# Patient Record
Sex: FEMALE | Race: WHITE | NOT HISPANIC OR LATINO | Employment: OTHER | URBAN - NONMETROPOLITAN AREA
[De-identification: names, ages, dates, MRNs, and addresses within clinical notes are randomized per-mention and may not be internally consistent; named-entity substitution may affect disease eponyms.]

---

## 2017-01-31 ENCOUNTER — HOSPITAL ENCOUNTER (EMERGENCY)
Facility: HOSPITAL | Age: 62
Discharge: HOME | End: 2017-01-31
Attending: EMERGENCY MEDICINE

## 2017-01-31 VITALS
TEMPERATURE: 98.3 F | WEIGHT: 185 LBS | BODY MASS INDEX: 29.03 KG/M2 | HEART RATE: 57 BPM | RESPIRATION RATE: 18 BRPM | DIASTOLIC BLOOD PRESSURE: 81 MMHG | HEIGHT: 67 IN | OXYGEN SATURATION: 98 % | SYSTOLIC BLOOD PRESSURE: 117 MMHG

## 2017-01-31 DIAGNOSIS — T16.2XXA EAR FOREIGN BODY, LEFT, INITIAL ENCOUNTER: Primary | ICD-10-CM

## 2017-01-31 DIAGNOSIS — H92.02 EAR PAIN, LEFT: ICD-10-CM

## 2017-01-31 PROCEDURE — 99284 EMERGENCY DEPT VISIT MOD MDM: CPT

## 2017-01-31 RX ORDER — ACETAMINOPHEN 500 MG
1000 TABLET ORAL 2 TIMES DAILY
COMMUNITY

## 2017-01-31 RX ORDER — DULOXETIN HYDROCHLORIDE 60 MG/1
60 CAPSULE, DELAYED RELEASE ORAL 2 TIMES DAILY
COMMUNITY
End: 2017-04-27

## 2017-01-31 RX ORDER — OMEPRAZOLE 20 MG/1
20 CAPSULE, DELAYED RELEASE ORAL DAILY
COMMUNITY
End: 2022-05-27 | Stop reason: DRUGHIGH

## 2017-01-31 RX ORDER — FENOFIBRATE 160 MG/1
160 TABLET ORAL DAILY
COMMUNITY
End: 2019-01-11

## 2017-01-31 RX ORDER — LOSARTAN POTASSIUM 25 MG/1
25 TABLET ORAL DAILY
COMMUNITY
End: 2017-04-27

## 2017-01-31 RX ORDER — CYCLOBENZAPRINE HCL 10 MG
10 TABLET ORAL 3 TIMES DAILY PRN
COMMUNITY
End: 2017-12-04

## 2017-01-31 RX ORDER — TRIAMCINOLONE ACETONIDE 5 MG/G
1 CREAM TOPICAL 3 TIMES DAILY
COMMUNITY

## 2017-01-31 RX ORDER — POLYETHYLENE GLYCOL 3350 17 G/17G
17 POWDER, FOR SOLUTION ORAL NIGHTLY
COMMUNITY
End: 2017-05-31

## 2017-01-31 RX ORDER — TEMAZEPAM 30 MG/1
30 CAPSULE ORAL NIGHTLY PRN
COMMUNITY
End: 2017-05-31

## 2017-02-08 ENCOUNTER — TRANSCRIBE ORDERS (OUTPATIENT)
Dept: PHYSICAL THERAPY | Facility: HOSPITAL | Age: 62
End: 2017-02-08

## 2017-02-08 DIAGNOSIS — M54.2 CHRONIC NECK PAIN: Primary | ICD-10-CM

## 2017-02-08 DIAGNOSIS — G89.29 CHRONIC NECK PAIN: Primary | ICD-10-CM

## 2017-02-08 DIAGNOSIS — M48.02 CERVICAL STENOSIS OF SPINE: ICD-10-CM

## 2017-02-08 DIAGNOSIS — M51.36 DDD (DEGENERATIVE DISC DISEASE), LUMBAR: ICD-10-CM

## 2017-02-08 DIAGNOSIS — M54.5 LOW BACK PAIN, UNSPECIFIED BACK PAIN LATERALITY, UNSPECIFIED CHRONICITY, WITH SCIATICA PRESENCE UNSPECIFIED: ICD-10-CM

## 2017-02-10 ENCOUNTER — HOSPITAL ENCOUNTER (OUTPATIENT)
Dept: PHYSICAL THERAPY | Facility: HOSPITAL | Age: 62
Setting detail: THERAPIES SERIES
Discharge: HOME | End: 2017-02-10

## 2017-02-10 DIAGNOSIS — G89.29 CHRONIC NECK PAIN: Primary | ICD-10-CM

## 2017-02-10 DIAGNOSIS — M54.2 CHRONIC NECK PAIN: Primary | ICD-10-CM

## 2017-02-10 PROCEDURE — G0283 ELEC STIM OTHER THAN WOUND: HCPCS | Performed by: PHYSICAL THERAPIST

## 2017-02-10 PROCEDURE — G8982 BODY POS GOAL STATUS: HCPCS | Performed by: PHYSICAL THERAPIST

## 2017-02-10 PROCEDURE — G8981 BODY POS CURRENT STATUS: HCPCS | Performed by: PHYSICAL THERAPIST

## 2017-02-10 PROCEDURE — 97161 PT EVAL LOW COMPLEX 20 MIN: CPT | Performed by: PHYSICAL THERAPIST

## 2017-02-10 NOTE — PROGRESS NOTES
Outpatient Physical Therapy Ortho Initial Evaluation  [facility]     Patient Name: Maude Joyner  : 1955  MRN: [id number]  Today's Date: 2/10/2017      Visit Date: 02/10/2017    Pt attended  scheduled visits.   Re-cert date 3/3/17   Pt will RTD 3/10/17    There is no problem list on file for this patient.       Past Medical History   Diagnosis Date   • Fibromyalgia    • GERD (gastroesophageal reflux disease)    • Hyperlipidemia    • Hypertension         Past Surgical History   Procedure Laterality Date   • Knee arthroplasty     • Back surgery     • Hysterectomy         Visit Dx:     ICD-10-CM ICD-9-CM   1. Chronic neck pain M54.2 723.1    G89.29 338.29                 PT Ortho       02/10/17 1000    Subjective Comments    Subjective Comments 61 y.o female presents with c/o R sided neck pain, no c/o radiation of pain to UE. . Statrted aprox 8 mo ago with no recall of JOSIAH. States has pain to look down,as to read, for prolonged periods. States has soreness interfering with sleep, awakes with headaches which are rellieved by medication. Pt has been dealing with back and L hip pain recently. Pt lives with sridhar at home. She is retired.   -LF    Subjective Pain    Able to rate subjective pain? yes  -LF    Pre-Treatment Pain Level 7  -LF    Special Tests/Palpation    Special Tests/Palpation --   TTP over R C paraspinals, upper trap, suboccipital   -LF    ROM (Range of Motion)    General ROM Detail CROM: flexion WFL, mil dc/o; ext aprox 20  with c/o stiffness ; SB: L 25 with c/o stiffness on R, L 10 degrees with c/o painin R lateral neck; rotationL WNL, R aprox 20 with c/o R sided pulling.   -LF      User Key  (r) = Recorded By, (t) = Taken By, (c) = Cosigned By    Initials Name Provider Type     Ana Fitzgerald, PT Physical Therapist                                PT OP Goals       02/10/17 1000          PT Short Term Goals    STG Date to Achieve 17  -LF      STG 1 Pt will demonstrate 50% neck AROM  with trace c/o discomfort  -LF      STG 2 Pt will drive and read with min c/o neck pain.   -LF      STG 3 Pt will score 20 or better on NDI  -LF      STG 4 Pt will be I in HEP each session  -LF      Long Term Goals    LTG Date to Achieve 03/10/17  -LF      LTG 1 Pt will demonstrate full CROM without c/o pain  -LF      LTG 2 Pt will deny tendeness to palpation over R neck, paraspinals  -LF      LTG 3 Pt will be I in HEP to allow continuance of stretching after formal therapy is discontinued.   -LF      Time Calculation    PT Goal Re-Cert Due Date 03/03/17  -LF        User Key  (r) = Recorded By, (t) = Taken By, (c) = Cosigned By    Initials Name Provider Type     Ana Fitzgerald, PT Physical Therapist                PT Assessment/Plan       02/10/17 1000          PT Assessment    Functional Limitations Limitation in home management;Limitations in community activities;Performance in leisure activities;Performance in self-care ADL  -LF      Impairments Impaired flexibility;Impaired muscle length;Joint mobility;Muscle strength;Pain;Range of motion  -      Assessment Comments Pt presents with muscle tenderness and guarding of the neck, R upper trap. She would benefit from skilled intervention to address the pain, stiffness, limited motion, to educate, to instruct in HEP, modalities may be helpful.   -LF      Please refer to paper survey for additional self-reported information Yes  -LF      Rehab Potential Good  -LF      Patient/caregiver participated in establishment of treatment plan and goals Yes  -LF      Patient would benefit from skilled therapy intervention Yes  -LF      PT Plan    PT Frequency 2x/week  -LF      Predicted Duration of Therapy Intervention (days/wks) 4 weeks  -LF      Planned CPT's? PT RE-EVAL: 79424;PT THER PROC EA 15 MIN: 49679;PT THER ACT EA 15 MIN: 23693;PT MANUAL THERAPY EA 15 MIN: 22935;PT NEUROMUSC RE-EDUCATION EA 15 MIN: 24806;PT HOT OR COLD PACK TREAT MCARE;PT ELECTRICAL STIM UNATTEND:  ;PT TRACTION CERVICAL: 05193  -LF      Physical Therapy Interventions (Optional Details) dry needling;home exercise program;joint mobilization;manual therapy techniques;modalities;neuromuscular re-education;postural re-education;ROM (Range of Motion);strengthening;stretching  -LF      PT Plan Comments Continue therapy for stretching, strengthening, manual techniques, modalites, education, HEP instruction.  -LF        User Key  (r) = Recorded By, (t) = Taken By, (c) = Cosigned By    Initials Name Provider Type    SHERLYN Fitzgerald PT Physical Therapist                Modalities       02/10/17 1000          Moist Heat    MH Applied Yes  -LF      Location R neck to upper trap  -LF      Rx Minutes 10 mins  -LF      ELECTRICAL STIMULATION    Attended/Unattended Unattended  -LF      Stimulation Type IFC  -LF      Location/Electrode Placement/Other B low C paraspinals, R upper trap  -LF      Rx Minutes 20 mins  -LF        User Key  (r) = Recorded By, (t) = Taken By, (c) = Cosigned By    Initials Name Provider Type    SHERLYN Fitzgerald, JATIN Physical Therapist              Exercises       02/10/17 1000          Subjective Comments    Subjective Comments 61 y.o female presents with c/o R sided neck pain, no c/o radiation of pain to UE. . Statrted aprox 8 mo ago with no recall of JOSIAH. States has pain to look down,as to read, for prolonged periods. States has soreness interfering with sleep, awakes with headaches which are rellieved by medication. Pt has been dealing with back and L hip pain recently. Pt lives with sridhar at home. She is retired.   -LF      Subjective Pain    Able to rate subjective pain? yes  -LF      Pre-Treatment Pain Level 7  -LF        User Key  (r) = Recorded By, (t) = Taken By, (c) = Cosigned By    Initials Name Provider Type    SHERLYN Fitzgerald PT Physical Therapist                              Outcome Measures       02/10/17 0900          Neck Disability Index    Section 1 - Pain Intensity 2  -LF       Section 2 - Personal Care 2  -LF      Section 3 - Lifting 5  -LF      Section 4 - Work 3  -LF      Section 5 - Headaches 4  -LF      Section 6 - Concentration 1  -LF      Section 7 - Sleeping 3  -LF      Section 8 - Driving 1  -LF      Section 9 - Reading 2  -LF      Section 10 - Recreation 1  -LF      Neck Disability Index Score 24  -LF      Functional Assessment    Outcome Measure Options Neck Disability Index (NDI)  -LF        User Key  (r) = Recorded By, (t) = Taken By, (c) = Cosigned By    Initials Name Provider Type    LF Ana Fitzgerald, PT Physical Therapist            Time Calculation:   Start Time: 0943  Stop Time: 1030  Time Calculation (min): 47 min  Total Timed Code Minutes- PT: 47 minute(s)     Therapy Charges for Today     Code Description Service Date Service Provider Modifiers Qty    35440871600 HC PT CHNG MAIN POS CURRENT 2/10/2017 Ana Fitzgerald, PT GP, CJ 1    07213760505 HC PT CHNG MAIN POS PROJECTED 2/10/2017 Ana Fitzgerald, PT GP, CI 1    16479106852 HC PT EVAL LOW COMPLEXITY 2 2/10/2017 Ana Fitzgerald, PT GP 1     IN TENS SUPPL 2 LEAD PER MONTH 2/10/2017 Ana Fitzgerald, PT  1    01403086893 HC PT THER SUPP EA 15 MIN 2/10/2017 Ana Fitzgerald, PT GP 1    29605668242 HC ELECTRICAL STIM UNATTENDED 2/10/2017 Ana Fitzgerald, PT  1          PT G-Codes  PT Professional Judgement Used?: Yes  Outcome Measure Options: Neck Disability Index (NDI)  Score: 24  Functional Limitation: Changing and maintaining body position  Changing and Maintaining Body Position Current Status (): At least 20 percent but less than 40 percent impaired, limited or restricted  Changing and Maintaining Body Position Goal Status (): At least 1 percent but less than 20 percent impaired, limited or restricted         Ana Fitzgerald, PT  2/10/2017

## 2017-02-15 ENCOUNTER — HOSPITAL ENCOUNTER (OUTPATIENT)
Dept: PHYSICAL THERAPY | Facility: HOSPITAL | Age: 62
Setting detail: THERAPIES SERIES
Discharge: HOME | End: 2017-02-15

## 2017-02-15 DIAGNOSIS — G89.29 CHRONIC NECK PAIN: Primary | ICD-10-CM

## 2017-02-15 DIAGNOSIS — M54.2 CHRONIC NECK PAIN: Primary | ICD-10-CM

## 2017-02-15 PROCEDURE — G0283 ELEC STIM OTHER THAN WOUND: HCPCS

## 2017-02-15 PROCEDURE — 97140 MANUAL THERAPY 1/> REGIONS: CPT

## 2017-02-15 NOTE — PROGRESS NOTES
Outpatient Physical Therapy Ortho Treatment Note  [facility]     Patient Name: Maude Joyner  : 1955  MRN: [id number]  Today's Date: 2/15/2017      Visit Date: 02/15/2017     Subjective improvement 0  Visits 2/2  Visits [id number]  RTMD 3-  Recert 3-      Visit Dx:    ICD-10-CM ICD-9-CM   1. Chronic neck pain M54.2 723.1    G89.29 338.29       There is no problem list on file for this patient.       Past Medical History   Diagnosis Date   • Fibromyalgia    • GERD (gastroesophageal reflux disease)    • Hyperlipidemia    • Hypertension         Past Surgical History   Procedure Laterality Date   • Knee arthroplasty     • Back surgery     • Hysterectomy                               PT Assessment/Plan       02/10/17 1000          PT Assessment    Functional Limitations Limitation in home management;Limitations in community activities;Performance in leisure activities;Performance in self-care ADL  -LF      Impairments Impaired flexibility;Impaired muscle length;Joint mobility;Muscle strength;Pain;Range of motion  -LF      Assessment Comments Pt presents with muscle tenderness and guarding of the neck, R upper trap. She would benefit from skilled intervention to address the pain, stiffness, limited motion, to educate, to instruct in HEP, modalities may be helpful.   -LF      Please refer to paper survey for additional self-reported information Yes  -LF      Rehab Potential Good  -LF      Patient/caregiver participated in establishment of treatment plan and goals Yes  -LF      Patient would benefit from skilled therapy intervention Yes  -LF      PT Plan    PT Frequency 2x/week  -LF      Predicted Duration of Therapy Intervention (days/wks) 4 weeks  -LF      Planned CPT's? PT RE-EVAL: 48917;PT THER PROC EA 15 MIN: 26612;PT THER ACT EA 15 MIN: 70135;PT MANUAL THERAPY EA 15 MIN: 31111;PT NEUROMUSC RE-EDUCATION EA 15 MIN: 27926;PT HOT OR COLD PACK TREAT MCARE;PT ELECTRICAL STIM UNATTEND: ;PT  TRACTION CERVICAL: 57974  -LF      Physical Therapy Interventions (Optional Details) dry needling;home exercise program;joint mobilization;manual therapy techniques;modalities;neuromuscular re-education;postural re-education;ROM (Range of Motion);strengthening;stretching  -      PT Plan Comments Continue therapy for stretching, strengthening, manual techniques, modalites, education, HEP instruction.  -LF        User Key  (r) = Recorded By, (t) = Taken By, (c) = Cosigned By    Initials Name Provider Type     Ana Fitzgerald, PT Physical Therapist                Modalities       02/15/17 1500          Subjective Comments    Subjective Comments Cont to c/o stiffness and pain  -CP      Subjective Pain    Able to rate subjective pain? yes  -CP      Pre-Treatment Pain Level 5  -CP      Post-Treatment Pain Level 2  -CP      Moist Heat    MH Applied Yes  -CP      Location Cspine  -CP      Rx Minutes 10 mins  -CP      ELECTRICAL STIMULATION    Attended/Unattended Unattended  -CP      Stimulation Type IFC  -CP      Location/Electrode Placement/Other cspine  -CP      Rx Minutes 20 mins  -CP        User Key  (r) = Recorded By, (t) = Taken By, (c) = Cosigned By    Initials Name Provider Type     Gloria Robbins PTA Physical Therapy Assistant                Exercises       02/15/17 1500          Subjective Comments    Subjective Comments Cont to c/o stiffness and pain  -CP      Subjective Pain    Able to rate subjective pain? yes  -CP      Pre-Treatment Pain Level 5  -CP      Post-Treatment Pain Level 2  -CP      Exercise 1    Exercise Name 1 --  -CP      Reps 1 --  -CP      Time (Seconds) 1 --  -CP        User Key  (r) = Recorded By, (t) = Taken By, (c) = Cosigned By    Initials Name Provider Type    CP Gloria Robbins PTA Physical Therapy Assistant                        Manual Rx (last 36 hours)      Manual Treatments       02/15/17 1500          Manual Rx 1    Manual Rx 1 Location Cspine  -CP      Manual Rx 1 Type  Gentle Distraction and rom  -CP      Manual Rx 1 Duration 10  -CP      Manual Rx 2    Manual Rx 2 Location c-spine  -CP      Manual Rx 2 Type ME to correction right cspine rotation  -CP      Manual Rx 2 Duration 5  -CP        User Key  (r) = Recorded By, (t) = Taken By, (c) = Cosigned By    Initials Name Provider Type    CP Gloria Robbins PTA Physical Therapy Assistant                PT OP Goals       02/15/17 1716 02/15/17 1600 02/10/17 1000    PT Short Term Goals    STG Date to Achieve  02/24/17  -CP 02/24/17  -LF    STG 1  Pt will demonstrate 50% neck AROM with trace c/o discomfort  -CP Pt will demonstrate 50% neck AROM with trace c/o discomfort  -LF    STG 1 Progress  Not Met  -CP     STG 2  Pt will drive and read with min c/o neck pain.   -CP Pt will drive and read with min c/o neck pain.   -LF    STG 2 Progress  Not Met  -CP     STG 3  Pt will score 20 or better on NDI  -CP Pt will score 20 or better on NDI  -LF    STG 3 Progress  Not Met  -CP     STG 4  Pt will be I in HEP each session  -CP Pt will be I in HEP each session  -LF    STG 4 Progress  Not Met  -CP     Long Term Goals    LTG Date to Achieve  03/10/17  -CP 03/10/17  -LF    LTG 1  Pt will demonstrate full CROM without c/o pain  -CP Pt will demonstrate full CROM without c/o pain  -LF    LTG 1 Progress  Not Met  -CP     LTG 2  Pt will deny tendeness to palpation over R neck, paraspinals  -CP Pt will deny tendeness to palpation over R neck, paraspinals  -LF    LTG 2 Progress  Not Met  -CP     LTG 3  Pt will be I in HEP to allow continuance of stretching after formal therapy is discontinued.   -CP Pt will be I in HEP to allow continuance of stretching after formal therapy is discontinued.   -LF    LTG 3 Progress  Progressing  -CP     Time Calculation    PT Goal Re-Cert Due Date 03/03/17  -CP  03/03/17  -LF      User Key  (r) = Recorded By, (t) = Taken By, (c) = Cosigned By    Initials Name Provider Type    CP Gloria Robbins PTA Physical Therapy  Assistant    SHERLYN Fitzgerald, PT Physical Therapist                Therapy Education       02/15/17 1649    Therapy Education    Given HEP   no new hep  -CP    Program Reinforced  -CP    How Provided Verbal  -CP    Provided to Patient  -CP    Level of Understanding Verbalized  -CP      User Key  (r) = Recorded By, (t) = Taken By, (c) = Cosigned By    Initials Name Provider Type    CP Gloria Robbins PTA Physical Therapy Assistant                Time Calculation:   Start Time: 1610  Stop Time: 1714  Time Calculation (min): 64 min  Total Timed Code Minutes- PT: 35 minute(s)    Therapy Charges for Today     Code Description Service Date Service Provider Modifiers Qty    52590742652 HC PT MANUAL THERAPY EA 15 MIN 2/15/2017 Gloria Robbins PTA GP 1    58361448795 HC ELECTRICAL STIM UNATTENDED 2/15/2017 Gloria Robbins PTA  1    41117712043 HC PT THER SUPP EA 15 MIN 2/15/2017 Gloria Robbins PTA GP 1                    Gloria Robbins PTA  2/15/2017

## 2017-02-17 ENCOUNTER — HOSPITAL ENCOUNTER (OUTPATIENT)
Dept: PHYSICAL THERAPY | Facility: HOSPITAL | Age: 62
Setting detail: THERAPIES SERIES
Discharge: HOME | End: 2017-02-17

## 2017-02-17 DIAGNOSIS — M54.2 CHRONIC NECK PAIN: Primary | ICD-10-CM

## 2017-02-17 DIAGNOSIS — G89.29 CHRONIC NECK PAIN: Primary | ICD-10-CM

## 2017-02-17 PROCEDURE — 97012 MECHANICAL TRACTION THERAPY: CPT

## 2017-02-17 PROCEDURE — 97110 THERAPEUTIC EXERCISES: CPT

## 2017-02-17 PROCEDURE — 97140 MANUAL THERAPY 1/> REGIONS: CPT

## 2017-02-17 PROCEDURE — G0283 ELEC STIM OTHER THAN WOUND: HCPCS

## 2017-02-17 NOTE — PROGRESS NOTES
Outpatient Physical Therapy Ortho Treatment Note  [facility]     Patient Name: Maude Joyner  : 1955  MRN: [id number]  Today's Date: 2017      Visit Date: 2017      Subjective Improvement 50%  Visits 3/3    RTMD 3-  Recert 3-    Visit Dx:    ICD-10-CM ICD-9-CM   1. Chronic neck pain M54.2 723.1    G89.29 338.29       There is no problem list on file for this patient.       Past Medical History   Diagnosis Date   • Fibromyalgia    • GERD (gastroesophageal reflux disease)    • Hyperlipidemia    • Hypertension         Past Surgical History   Procedure Laterality Date   • Knee arthroplasty     • Back surgery     • Hysterectomy                               PT Assessment/Plan       17 1213          PT Assessment    Assessment Comments Patients pain  to 0/10 after ME  -CP      PT Plan    PT Plan Comments Monitor HEP d/c traction and sub with manual distraction  -CP        User Key  (r) = Recorded By, (t) = Taken By, (c) = Cosigned By    Initials Name Provider Type    CP Gloria Robbins PTA Physical Therapy Assistant                Modalities       17 0900          Moist Heat    MH Applied Yes  -CP      Location cspine  -CP      Rx Minutes 10 mins  -CP      Traction 98316    Traction Type Cervical   D/C traction after today. Patient had increase occipitcal pn  -CP      Rx Minutes 10  -CP      Duration Intermittent  -CP      Position Supine  -CP      Weight --   10/5  -CP      Hold 60  -CP      Relax 20  -CP        User Key  (r) = Recorded By, (t) = Taken By, (c) = Cosigned By    Initials Name Provider Type    CP Gloria Robbins PTA Physical Therapy Assistant                Exercises       17 0900          Subjective Comments    Subjective Comments Patient states that neck is better, however is reporting vertigo.  Patient performs eply 3 times this week.  -CP      Subjective Pain    Able to rate subjective pain? yes  -CP      Pre-Treatment Pain Level 5   -CP      Post-Treatment Pain Level 0  -CP      Subjective Pain Comment Stopped taking pain meds yesterday secondary to the side affects  -CP      Exercise 1    Exercise Name 1  Isometric cervical side bend Bilateral  -CP      Reps 1 10  -CP      Time (Seconds) 1 3  -CP      Exercise 2    Exercise Name 2 Isometric Cervical Lateral Rotation Bilateral  -CP      Reps 2 10  -CP      Time (Seconds) 2 5  -CP      Exercise 3    Exercise Name 3 Supine Chin tucks  -CP      Reps 3 10  -CP      Time (Seconds) 3 5  -CP        User Key  (r) = Recorded By, (t) = Taken By, (c) = Cosigned By    Initials Name Provider Type    CP Gloria Robbins PTA Physical Therapy Assistant                        Manual Rx (last 36 hours)      Manual Treatments       02/17/17 0900          Manual Rx 1    Manual Rx 1 Location Cervical   -CP      Manual Rx 1 Type Gentle Distraction and ROM  -CP      Manual Rx 1 Duration 4  -CP      Manual Rx 2    Manual Rx 2 Location cervical  -CP      Manual Rx 2 Type ME to correction left post rotation c-3  -CP      Manual Rx 2 Duration 4  -CP        User Key  (r) = Recorded By, (t) = Taken By, (c) = Cosigned By    Initials Name Provider Type    CP Gloria Robbins PTA Physical Therapy Assistant                PT OP Goals       02/17/17 1000 02/15/17 1716 02/15/17 1600    PT Short Term Goals    STG Date to Achieve 02/24/17  -CP  02/24/17  -CP    STG 1 Pt will demonstrate 50% neck AROM with trace c/o discomfort  -CP  Pt will demonstrate 50% neck AROM with trace c/o discomfort  -CP    STG 1 Progress Met  -CP  Not Met  -CP    STG 2 Pt will drive and read with min c/o neck pain.   -CP  Pt will drive and read with min c/o neck pain.   -CP    STG 2 Progress Progressing  -CP  Not Met  -CP    STG 3 Pt will score 20 or better on NDI  -CP  Pt will score 20 or better on NDI  -CP    STG 3 Progress Not Met  -CP  Not Met  -CP    STG 4 Pt will be I in HEP each session  -CP  Pt will be I in HEP each session  -CP    STG 4  Progress Progressing  -CP  Not Met  -CP    Long Term Goals    LTG Date to Achieve 03/10/17  -CP  03/10/17  -CP    LTG 1 Pt will demonstrate full CROM without c/o pain  -CP  Pt will demonstrate full CROM without c/o pain  -CP    LTG 1 Progress Met  -CP  Not Met  -CP    LTG 2 Pt will deny tendeness to palpation over R neck, paraspinals  -CP  Pt will deny tendeness to palpation over R neck, paraspinals  -CP    LTG 2 Progress Not Met  -CP  Not Met  -CP    LTG 3 Pt will be I in HEP to allow continuance of stretching after formal therapy is discontinued.   -CP  Pt will be I in HEP to allow continuance of stretching after formal therapy is discontinued.   -CP    LTG 3 Progress Progressing  -CP  Progressing  -CP    Time Calculation    PT Goal Re-Cert Due Date  03/03/17  -CP       02/10/17 1000          PT Short Term Goals    STG Date to Achieve 02/24/17  -LF      STG 1 Pt will demonstrate 50% neck AROM with trace c/o discomfort  -LF      STG 2 Pt will drive and read with min c/o neck pain.   -LF      STG 3 Pt will score 20 or better on NDI  -LF      STG 4 Pt will be I in HEP each session  -LF      Long Term Goals    LTG Date to Achieve 03/10/17  -LF      LTG 1 Pt will demonstrate full CROM without c/o pain  -LF      LTG 2 Pt will deny tendeness to palpation over R neck, paraspinals  -LF      LTG 3 Pt will be I in HEP to allow continuance of stretching after formal therapy is discontinued.   -LF      Time Calculation    PT Goal Re-Cert Due Date 03/03/17  -LF        User Key  (r) = Recorded By, (t) = Taken By, (c) = Cosigned By    Initials Name Provider Type    CP Gloria Robbins, PTA Physical Therapy Assistant    LF Ana Fitzgerald, PT Physical Therapist                Therapy Education       02/17/17 1018    Therapy Education    Given HEP   Isometric kamran side bending and rotation bilateral, supine chin tucks  -CP    Program New  -CP    How Provided Verbal;Demonstration;Written  -CP    Provided to Patient  -CP    Level of  Understanding Verbalized;Demonstrated  -CP      02/15/17 1649    Therapy Education    Given HEP   no new hep  -CP    Program Reinforced  -CP    How Provided Verbal  -CP    Provided to Patient  -CP    Level of Understanding Verbalized  -CP      User Key  (r) = Recorded By, (t) = Taken By, (c) = Cosigned By    Initials Name Provider Type    CP Gloria Robbins PTA Physical Therapy Assistant                Time Calculation:   Start Time: 0935  Stop Time: 1100  Time Calculation (min): 85 min  Total Timed Code Minutes- PT: 60 minute(s)    Therapy Charges for Today     Code Description Service Date Service Provider Modifiers Qty    89057617616 HC PT THER PROC EA 15 MIN 2/17/2017 Gloria Robbins PTA GP 1    55446366346 HC PT MANUAL THERAPY EA 15 MIN 2/17/2017 Gloria Robbins PTA GP 1    45734651814 HC PT TRACTION CERVICAL 2/17/2017 Gloria Robbins PTA GP 1    17859140381 HC ELECTRICAL STIM UNATTENDED 2/17/2017 Gloria Robbins PTA  1    00815999654 HC PT THER SUPP EA 15 MIN 2/17/2017 Gloria Robbins PTA GP 1                    Gloria Robbins PTA  2/17/2017

## 2017-02-21 ENCOUNTER — HOSPITAL ENCOUNTER (OUTPATIENT)
Dept: PHYSICAL THERAPY | Facility: HOSPITAL | Age: 62
Setting detail: THERAPIES SERIES
Discharge: HOME | End: 2017-02-21

## 2017-02-21 DIAGNOSIS — M54.2 CHRONIC NECK PAIN: Primary | ICD-10-CM

## 2017-02-21 DIAGNOSIS — G89.29 CHRONIC NECK PAIN: Primary | ICD-10-CM

## 2017-02-21 PROCEDURE — 97110 THERAPEUTIC EXERCISES: CPT

## 2017-02-21 PROCEDURE — 97140 MANUAL THERAPY 1/> REGIONS: CPT

## 2017-02-21 PROCEDURE — G0283 ELEC STIM OTHER THAN WOUND: HCPCS

## 2017-02-21 NOTE — PROGRESS NOTES
Outpatient Physical Therapy Ortho Treatment Note  [facility]     Patient Name: Maude Joyner  : 1955  MRN: [id number]  Today's Date: 2017      Visit Date: 2017     Subjective Improvement 90%  Visits   RTMD 3-  Recert date 3-    Visit Dx:    ICD-10-CM ICD-9-CM   1. Chronic neck pain M54.2 723.1    G89.29 338.29       There is no problem list on file for this patient.       Past Medical History   Diagnosis Date   • Fibromyalgia    • GERD (gastroesophageal reflux disease)    • Hyperlipidemia    • Hypertension         Past Surgical History   Procedure Laterality Date   • Knee arthroplasty     • Back surgery     • Hysterectomy                               PT Assessment/Plan       17 1034 17 1213       PT Assessment    Assessment Comments Patient progressing nicely,  No cspine rotation noted this date.  patients pain after manual 0/10  -CP Patients pain  to 0/10 after ME  -CP     PT Plan    PT Plan Comments One more visit then d/c to home and fitness  -CP Monitor HEP d/c traction and sub with manual distraction  -CP       User Key  (r) = Recorded By, (t) = Taken By, (c) = Cosigned By    Initials Name Provider Type    ZIGGY Robbins PTA Physical Therapy Assistant                Modalities       17 0900          Subjective Comments    Subjective Comments Patient states that nect is better.  Main c/o is stiffness  -CP      Subjective Pain    Able to rate subjective pain? yes  -CP      Pre-Treatment Pain Level 2  -CP      Subjective Pain Comment over counter meds  -CP      Moist Heat    MH Applied Yes  -CP      Location cspine  -CP      Rx Minutes 10 mins  -CP      ELECTRICAL STIMULATION    Attended/Unattended Unattended  -CP      Stimulation Type IFC  -CP      Location/Electrode Placement/Other cspine  -CP      Rx Minutes 20 mins  -CP        User Key  (r) = Recorded By, (t) = Taken By, (c) = Cosigned By    Initials Name Provider Type    ZIGGY PORTILLO  DUANE Robbins Physical Therapy Assistant                Exercises       02/21/17 0900          Subjective Comments    Subjective Comments Patient states that nect is better.  Main c/o is stiffness  -CP      Subjective Pain    Able to rate subjective pain? yes  -CP      Pre-Treatment Pain Level 2  -CP      Subjective Pain Comment over counter meds  -CP      Exercise 1    Exercise Name 1 Bilateral ut Stretch  -CP      Reps 1 3  -CP      Time (Seconds) 1 30  -CP      Exercise 2    Exercise Name 2 Levator Stretch  -CP      Reps 2 3  -CP      Time (Seconds) 2 30  -CP      Exercise 3    Exercise Name 3 supine chin tucks  -CP      Reps 3 10  -CP      Time (Seconds) 3 3  -CP        User Key  (r) = Recorded By, (t) = Taken By, (c) = Cosigned By    Initials Name Provider Type    CP Gloria Robbins PTA Physical Therapy Assistant                        Manual Rx (last 36 hours)      Manual Treatments       02/21/17 0900          Manual Rx 1    Manual Rx 1 Location Cspine  -CP      Manual Rx 1 Type distraction and prom  -CP      Manual Rx 1 Duration 10  -CP      Manual Rx 2    Manual Rx 2 Location cspine  -CP      Manual Rx 2 Type occipital release  -CP      Manual Rx 2 Duration 5  -CP        User Key  (r) = Recorded By, (t) = Taken By, (c) = Cosigned By    Initials Name Provider Type    CP Gloria Robbins PTA Physical Therapy Assistant                PT OP Goals       02/21/17 1000 02/17/17 1000 02/15/17 1716    PT Short Term Goals    STG Date to Achieve 02/24/17  -CP 02/24/17  -CP     STG 1 Pt will demonstrate 50% neck AROM with trace c/o discomfort  -CP Pt will demonstrate 50% neck AROM with trace c/o discomfort  -CP     STG 1 Progress Met  -CP Met  -CP     STG 2 Pt will drive and read with min c/o neck pain.   -CP Pt will drive and read with min c/o neck pain.   -CP     STG 2 Progress Met  -CP Progressing  -CP     STG 3 Pt will score 20 or better on NDI  -CP Pt will score 20 or better on NDI  -CP     STG 3 Progress Not Met   -CP Not Met  -CP     STG 4 Pt will be I in HEP each session  -CP Pt will be I in HEP each session  -CP     STG 4 Progress Met  -CP Progressing  -CP     Long Term Goals    LTG Date to Achieve 03/10/17  -CP 03/10/17  -CP     LTG 1 Pt will demonstrate full CROM without c/o pain  -CP Pt will demonstrate full CROM without c/o pain  -CP     LTG 1 Progress Met  -CP Met  -CP     LTG 2 Pt will deny tendeness to palpation over R neck, paraspinals  -CP Pt will deny tendeness to palpation over R neck, paraspinals  -CP     LTG 2 Progress Not Met  -CP Not Met  -CP     LTG 3 Pt will be I in HEP to allow continuance of stretching after formal therapy is discontinued.   -CP Pt will be I in HEP to allow continuance of stretching after formal therapy is discontinued.   -CP     LTG 3 Progress Progressing  -CP Progressing  -CP     Time Calculation    PT Goal Re-Cert Due Date 03/03/17  -CP  03/03/17  -CP      02/15/17 1600 02/10/17 1000       PT Short Term Goals    STG Date to Achieve 02/24/17  -CP 02/24/17  -LF     STG 1 Pt will demonstrate 50% neck AROM with trace c/o discomfort  -CP Pt will demonstrate 50% neck AROM with trace c/o discomfort  -LF     STG 1 Progress Not Met  -CP      STG 2 Pt will drive and read with min c/o neck pain.   -CP Pt will drive and read with min c/o neck pain.   -LF     STG 2 Progress Not Met  -CP      STG 3 Pt will score 20 or better on NDI  -CP Pt will score 20 or better on NDI  -LF     STG 3 Progress Not Met  -CP      STG 4 Pt will be I in HEP each session  -CP Pt will be I in HEP each session  -LF     STG 4 Progress Not Met  -CP      Long Term Goals    LTG Date to Achieve 03/10/17  -CP 03/10/17  -LF     LTG 1 Pt will demonstrate full CROM without c/o pain  -CP Pt will demonstrate full CROM without c/o pain  -LF     LTG 1 Progress Not Met  -CP      LTG 2 Pt will deny tendeness to palpation over R neck, paraspinals  -CP Pt will deny tendeness to palpation over R neck, paraspinals  -LF     LTG 2 Progress  Not Met  -CP      LTG 3 Pt will be I in HEP to allow continuance of stretching after formal therapy is discontinued.   -CP Pt will be I in HEP to allow continuance of stretching after formal therapy is discontinued.   -LF     LTG 3 Progress Progressing  -CP      Time Calculation    PT Goal Re-Cert Due Date  03/03/17  -LF       User Key  (r) = Recorded By, (t) = Taken By, (c) = Cosigned By    Initials Name Provider Type    CP Gloria Robbins PTA Physical Therapy Assistant     Ana Fitzgerald, JATIN Physical Therapist                Therapy Education       02/21/17 1028    Therapy Education    Given HEP   no change to hep  -CP    Program Reinforced  -CP    How Provided Verbal  -CP    Level of Understanding Verbalized  -CP      02/17/17 1018    Therapy Education    Given HEP   Isometric kamran side bending and rotation bilateral, supine chin tucks  -CP    Program New  -CP    How Provided Verbal;Demonstration;Written  -CP    Provided to Patient  -CP    Level of Understanding Verbalized;Demonstrated  -CP      02/15/17 1649    Therapy Education    Given HEP   no new hep  -CP    Program Reinforced  -CP    How Provided Verbal  -CP    Provided to Patient  -CP    Level of Understanding Verbalized  -CP      User Key  (r) = Recorded By, (t) = Taken By, (c) = Cosigned By    Initials Name Provider Type    CP Gloria Robbins PTA Physical Therapy Assistant                Time Calculation:   Start Time: 0935  Stop Time: 1040  Time Calculation (min): 65 min  Total Timed Code Minutes- PT: 45 minute(s)    Therapy Charges for Today     Code Description Service Date Service Provider Modifiers Qty    23514527900 HC PT THER PROC EA 15 MIN 2/21/2017 Gloria Robbins PTA GP 1    48485148763 HC PT MANUAL THERAPY EA 15 MIN 2/21/2017 Gloria Robbins PTA GP 1    60486842703 HC ELECTRICAL STIM UNATTENDED 2/21/2017 Gloria Robbins PTA  1    90271294999 HC PT THER SUPP EA 15 MIN 2/21/2017 Gloria Robbins PTA GP 1                    Gloria  BANDAR Robbins, PTA  2/21/2017

## 2017-02-23 ENCOUNTER — HOSPITAL ENCOUNTER (OUTPATIENT)
Dept: PHYSICAL THERAPY | Facility: HOSPITAL | Age: 62
Setting detail: THERAPIES SERIES
Discharge: HOME | End: 2017-02-23

## 2017-02-23 DIAGNOSIS — G89.29 CHRONIC NECK PAIN: Primary | ICD-10-CM

## 2017-02-23 DIAGNOSIS — M54.2 CHRONIC NECK PAIN: Primary | ICD-10-CM

## 2017-02-23 PROCEDURE — 97140 MANUAL THERAPY 1/> REGIONS: CPT

## 2017-02-23 PROCEDURE — 97110 THERAPEUTIC EXERCISES: CPT

## 2017-02-23 PROCEDURE — G0283 ELEC STIM OTHER THAN WOUND: HCPCS

## 2017-02-23 NOTE — PROGRESS NOTES
Outpatient Physical Therapy Ortho Treatment Note  [facility]     Patient Name: Maude Joyner  : 1955  MRN: [id number]  Today's Date: 2017      Visit Date: 2017     Subjective Improvement 90%  Visits 5/5  Visits approved medicare cap  RTMD 2017  D/C to home and one free month fitness membership    Visit Dx:    ICD-10-CM ICD-9-CM   1. Chronic neck pain M54.2 723.1    G89.29 338.29       There is no problem list on file for this patient.       Past Medical History   Diagnosis Date   • Fibromyalgia    • GERD (gastroesophageal reflux disease)    • Hyperlipidemia    • Hypertension         Past Surgical History   Procedure Laterality Date   • Knee arthroplasty     • Back surgery     • Hysterectomy                               PT Assessment/Plan       17 1049 17 1034 17 1213    PT Assessment    Assessment Comments ME corrected cpine rotation.  Pain decrease to 1/10. just soreness  -CP Patient progressing nicely,  No cspine rotation noted this date.  patients pain after manual 0/10  -CP Patients pain  to 0/10 after ME  -CP    PT Plan    PT Plan Comments D/C today to home and one month free fitness memebership  -CP One more visit then d/c to home and fitness  -CP Monitor HEP d/c traction and sub with manual distraction  -CP      User Key  (r) = Recorded By, (t) = Taken By, (c) = Cosigned By    Initials Name Provider Type    CP Gloria Robbins PTA Physical Therapy Assistant                Modalities       17 0900          Subjective Comments    Subjective Comments Patient states that she cleaned her car out yesterday and neck is sore today.  Mainly MM soreness.  today will be her last day.  Patients boyfriend will be having surgery and she will need to stay home to take care of him.  -CP      Subjective Pain    Able to rate subjective pain? yes  -CP      Pre-Treatment Pain Level 4  -CP      Subjective Pain Comment Over counter pain pills  -CP      Moist Heat     MH Applied Yes  -CP      Location cspine  -CP      Rx Minutes 15 mins  -CP      Ice    Ice Applied Yes  -CP      Location cspine with ifc  -CP      Rx Minutes --   20  -CP      Ice S/P Rx Yes  -CP      ELECTRICAL STIMULATION    Attended/Unattended Unattended  -CP      Stimulation Type IFC  -CP      Location/Electrode Placement/Other cspine  -CP      Rx Minutes 20 mins  -CP        User Key  (r) = Recorded By, (t) = Taken By, (c) = Cosigned By    Initials Name Provider Type    CP Gloria Robbins PTA Physical Therapy Assistant                Exercises       02/23/17 0900          Subjective Comments    Subjective Comments Patient states that she cleaned her car out yesterday and neck is sore today.  Mainly MM soreness.  today will be her last day.  Patients boyfriend will be having surgery and she will need to stay home to take care of him.  -CP      Subjective Pain    Able to rate subjective pain? yes  -CP      Pre-Treatment Pain Level 4  -CP      Subjective Pain Comment Over counter pain pills  -CP      Exercise 1    Exercise Name 1 Bilateral UT Stretch  -CP      Reps 1 3  -CP      Time (Seconds) 1 30  -CP      Exercise 2    Exercise Name 2 Levator Stretch  -CP      Reps 2 3  -CP      Time (Seconds) 2 30  -CP      Exercise 3    Exercise Name 3 Doorway Stretch  -CP      Reps 3 3  -CP      Time (Seconds) 3 30  -CP      Exercise 4    Exercise Name 4 standing scap row   -CP      Cueing 4 Tactile  -CP      Equipment 4 Theraband  -CP      Resistance 4 Red   latex free  -CP      Sets 4 2  -CP      Reps 4 10  -CP      Exercise 5    Exercise Name 5 No Money with Therabnd  -CP      Cueing 5 Verbal  -CP      Equipment 5 Theraband  -CP      Resistance 5 Red   latex free  -CP      Sets 5 2  -CP      Reps 5 10  -CP        User Key  (r) = Recorded By, (t) = Taken By, (c) = Cosigned By    Initials Name Provider Type    CP Gloria Robbins PTA Physical Therapy Assistant                        Manual Rx (last 36 hours)      Manual  Treatments       02/23/17 0900          Manual Rx 1    Manual Rx 1 Location cspine   -CP      Manual Rx 1 Type Distraction and prom  -CP      Manual Rx 1 Duration 4  -CP      Manual Rx 2    Manual Rx 2 Location cspine  -CP      Manual Rx 2 Type ME to corrrect left rotation  -CP      Manual Rx 2 Duration 4  -CP        User Key  (r) = Recorded By, (t) = Taken By, (c) = Cosigned By    Initials Name Provider Type    CP Gloria Robbins PTA Physical Therapy Assistant                PT OP Goals       02/23/17 1000 02/23/17 0100 02/21/17 1000    PT Short Term Goals    STG Date to Achieve  02/24/17  -CP 02/24/17  -CP    STG 1  Pt will demonstrate 50% neck AROM with trace c/o discomfort  -CP Pt will demonstrate 50% neck AROM with trace c/o discomfort  -CP    STG 1 Progress  Met  -CP Met  -CP    STG 2  Pt will drive and read with min c/o neck pain.   -CP Pt will drive and read with min c/o neck pain.   -CP    STG 2 Progress  Met  -CP Met  -CP    STG 3  Pt will score 20 or better on NDI  -CP Pt will score 20 or better on NDI  -CP    STG 3 Progress  Not Met  -CP Not Met  -CP    STG 4  Pt will be I in HEP each session  -CP Pt will be I in HEP each session  -CP    STG 4 Progress  Met  -CP Met  -CP    Long Term Goals    LTG Date to Achieve  03/10/17  -CP 03/10/17  -CP    LTG 1  Pt will demonstrate full CROM without c/o pain  -CP Pt will demonstrate full CROM without c/o pain  -CP    LTG 1 Progress  Met  -CP Met  -CP    LTG 2  Pt will deny tendeness to palpation over R neck, paraspinals  -CP Pt will deny tendeness to palpation over R neck, paraspinals  -CP    LTG 2 Progress  Not Met  -CP Not Met  -CP    LTG 3  Pt will be I in HEP to allow continuance of stretching after formal therapy is discontinued.   -CP Pt will be I in HEP to allow continuance of stretching after formal therapy is discontinued.   -CP    LTG 3 Progress  Met  -CP Progressing  -CP    Time Calculation    PT Goal Re-Cert Due Date 03/03/17  -CP  03/03/17  -CP       02/17/17 1000 02/15/17 1716 02/15/17 1600    PT Short Term Goals    STG Date to Achieve 02/24/17  -CP  02/24/17  -CP    STG 1 Pt will demonstrate 50% neck AROM with trace c/o discomfort  -CP  Pt will demonstrate 50% neck AROM with trace c/o discomfort  -CP    STG 1 Progress Met  -CP  Not Met  -CP    STG 2 Pt will drive and read with min c/o neck pain.   -CP  Pt will drive and read with min c/o neck pain.   -CP    STG 2 Progress Progressing  -CP  Not Met  -CP    STG 3 Pt will score 20 or better on NDI  -CP  Pt will score 20 or better on NDI  -CP    STG 3 Progress Not Met  -CP  Not Met  -CP    STG 4 Pt will be I in HEP each session  -CP  Pt will be I in HEP each session  -CP    STG 4 Progress Progressing  -CP  Not Met  -CP    Long Term Goals    LTG Date to Achieve 03/10/17  -CP  03/10/17  -CP    LTG 1 Pt will demonstrate full CROM without c/o pain  -CP  Pt will demonstrate full CROM without c/o pain  -CP    LTG 1 Progress Met  -CP  Not Met  -CP    LTG 2 Pt will deny tendeness to palpation over R neck, paraspinals  -CP  Pt will deny tendeness to palpation over R neck, paraspinals  -CP    LTG 2 Progress Not Met  -CP  Not Met  -CP    LTG 3 Pt will be I in HEP to allow continuance of stretching after formal therapy is discontinued.   -CP  Pt will be I in HEP to allow continuance of stretching after formal therapy is discontinued.   -CP    LTG 3 Progress Progressing  -CP  Progressing  -CP    Time Calculation    PT Goal Re-Cert Due Date  03/03/17  -CP       02/10/17 1000          PT Short Term Goals    STG Date to Achieve 02/24/17  -LF      STG 1 Pt will demonstrate 50% neck AROM with trace c/o discomfort  -LF      STG 2 Pt will drive and read with min c/o neck pain.   -LF      STG 3 Pt will score 20 or better on NDI  -LF      STG 4 Pt will be I in HEP each session  -LF      Long Term Goals    LTG Date to Achieve 03/10/17  -LF      LTG 1 Pt will demonstrate full CROM without c/o pain  -LF      LTG 2 Pt will deny tendeness  to palpation over R neck, paraspinals  -LF      LTG 3 Pt will be I in HEP to allow continuance of stretching after formal therapy is discontinued.   -LF      Time Calculation    PT Goal Re-Cert Due Date 03/03/17  -LF        User Key  (r) = Recorded By, (t) = Taken By, (c) = Cosigned By    Initials Name Provider Type    CP Gloria Robbins PTA Physical Therapy Assistant    LF Ana Fitzgerald, PT Physical Therapist                Therapy Education       02/23/17 0958    Therapy Education    Given HEP   Doorway stretch, No money with theraband, mid row with theraban  -CP    Program New  -CP    How Provided Verbal;Demonstration;Written  -CP    Provided to Patient  -CP    Level of Understanding Verbalized;Demonstrated  -CP      02/21/17 1028    Therapy Education    Given HEP   no change to hep  -CP    Program Reinforced  -CP    How Provided Verbal  -CP    Level of Understanding Verbalized  -CP      02/17/17 1018    Therapy Education    Given HEP   Isometric kamran side bending and rotation bilateral, supine chin tucks  -CP    Program New  -CP    How Provided Verbal;Demonstration;Written  -CP    Provided to Patient  -CP    Level of Understanding Verbalized;Demonstrated  -CP      User Key  (r) = Recorded By, (t) = Taken By, (c) = Cosigned By    Initials Name Provider Type    CP Gloria Robbins PTA Physical Therapy Assistant                Time Calculation:   Start Time: 0930  Stop Time: 1035  Time Calculation (min): 65 min  Total Timed Code Minutes- PT: 45 minute(s)    Therapy Charges for Today     Code Description Service Date Service Provider Modifiers Qty     [place] TENS SUPPL 2 LEAD PER MONTH 2/23/2017 Gloria Robbins PTA  1    75574236170 HC ELECTRICAL STIM UNATTENDED 2/23/2017 Gloria Robbins PTA  1    40281309756 HC PT MANUAL THERAPY EA 15 MIN 2/23/2017 Gloria Robbins PTA GP 1    18189601177 HC PT THER PROC EA 15 MIN 2/23/2017 Gloria Robbins PTA GP 1    20467056895 HC PT THER SUPP EA 15 MIN 2/23/2017  Gloria Robbins, PTA GP 1                    Gloria Robbins, PTA  2/23/2017

## 2017-04-27 ENCOUNTER — OFFICE VISIT (OUTPATIENT)
Dept: OTOLARYNGOLOGY | Facility: CLINIC | Age: 62
End: 2017-04-27

## 2017-04-27 ENCOUNTER — CLINICAL SUPPORT (OUTPATIENT)
Dept: AUDIOLOGY | Facility: CLINIC | Age: 62
End: 2017-04-27

## 2017-04-27 VITALS — TEMPERATURE: 96.9 F | WEIGHT: 226 LBS | HEIGHT: 67 IN | BODY MASS INDEX: 35.47 KG/M2

## 2017-04-27 DIAGNOSIS — H93.12 TINNITUS OF LEFT EAR: Primary | ICD-10-CM

## 2017-04-27 DIAGNOSIS — H90.5 SENSORINEURAL HEARING LOSS OF LEFT EAR: ICD-10-CM

## 2017-04-27 DIAGNOSIS — H90.3 SENSORINEURAL HEARING LOSS, ASYMMETRICAL: Primary | ICD-10-CM

## 2017-04-27 PROCEDURE — 99203 OFFICE O/P NEW LOW 30 MIN: CPT | Performed by: OTOLARYNGOLOGY

## 2017-04-27 RX ORDER — METOPROLOL TARTRATE 100 MG/1
100 TABLET ORAL 2 TIMES DAILY
COMMUNITY
End: 2017-12-04

## 2017-04-27 RX ORDER — ESTRADIOL 0.5 MG/1
0.5 TABLET ORAL 2 TIMES WEEKLY
COMMUNITY
End: 2022-06-07

## 2017-04-27 RX ORDER — LOSARTAN POTASSIUM 50 MG/1
TABLET ORAL
COMMUNITY
Start: 2017-03-17 | End: 2017-11-02

## 2017-04-27 NOTE — PROGRESS NOTES
Subjective   Maude Joyner is a 62 y.o. female.   CC: ringing in L ear    History of Present Illness   Synephrine ringing in her ear for quite some time for years but last 5 months Recently worse.  She has had some noise exposure in fact had again go off her ears the past she does wear hearing protection now she also has a history of Lyme disease during his gotten point was constant and almost roaring bothering her quite a bit is not keeping her up at night she is no ototoxic drug exposures no chronic hearing noise exposures      The following portions of the patient's history were reviewed and updated as appropriate: allergies, current medications, past family history, past medical history, past social history, past surgical history and problem list.      Maude Joyner reports that she has quit smoking. She does not have any smokeless tobacco history on file. She reports that she does not drink alcohol or use illicit drugs.  Patient is not a tobacco user and has been counseled for use of tobacco products    Family History   Problem Relation Age of Onset   • Heart disease Mother    • Hypertension Mother    • Cancer Father    • Heart disease Father    • Hypertension Father          Current Outpatient Prescriptions:   •  acetaminophen (TYLENOL) 500 MG tablet, Take 1,000 mg by mouth Every 6 (Six) Hours As Needed for mild pain (1-3)., Disp: , Rfl:   •  cyclobenzaprine (FLEXERIL) 10 MG tablet, Take 10 mg by mouth 3 (Three) Times a Day As Needed for muscle spasms., Disp: , Rfl:   •  estradiol (ESTRACE) 0.5 MG tablet, Take 0.5 mg by mouth Daily., Disp: , Rfl:   •  fenofibrate 160 MG tablet, Take 134 mg by mouth Daily., Disp: , Rfl:   •  losartan (COZAAR) 50 MG tablet, , Disp: , Rfl:   •  metoprolol tartrate (LOPRESSOR) 100 MG tablet, Take 100 mg by mouth 2 (Two) Times a Day., Disp: , Rfl:   •  omeprazole (priLOSEC) 20 MG capsule, Take 20 mg by mouth Daily., Disp: , Rfl:   •  polyethylene glycol (MIRALAX) packet, Take 17 g by  mouth Every Night., Disp: , Rfl:   •  temazepam (RESTORIL) 30 MG capsule, Take 30 mg by mouth At Night As Needed for sleep., Disp: , Rfl:   •  triamcinolone (KENALOG) 0.5 % cream, Apply 1 application topically 3 (Three) Times a Day., Disp: , Rfl:   •  mometasone (NASONEX) 50 MCG/ACT nasal spray, 2 sprays into each nostril Daily., Disp: 1 each, Rfl: 0      Past Medical History:   Diagnosis Date   • Fibromyalgia    • GERD (gastroesophageal reflux disease)    • Hyperlipidemia    • Hypertension          Review of Systems   HENT: Positive for hearing loss and tinnitus. Negative for ear discharge.    Endocrine:        Hot flashes   Musculoskeletal: Positive for arthralgias.   Skin:        Hair loss   Neurological: Positive for weakness and numbness.   Psychiatric/Behavioral:        Depression   All other systems reviewed and are negative.          Objective   Physical Exam   Constitutional: She is oriented to person, place, and time. She appears well-developed and well-nourished.   HENT:   Head: Normocephalic and atraumatic.   Right Ear: Hearing, tympanic membrane, external ear and ear canal normal.   Left Ear: Hearing, tympanic membrane, external ear and ear canal normal.   Nose: Nose normal. No mucosal edema, rhinorrhea, nasal deformity or septal deviation. No epistaxis. Right sinus exhibits no maxillary sinus tenderness and no frontal sinus tenderness. Left sinus exhibits no maxillary sinus tenderness and no frontal sinus tenderness.   Mouth/Throat: Uvula is midline, oropharynx is clear and moist and mucous membranes are normal. No trismus in the jaw. Normal dentition. No oropharyngeal exudate or posterior oropharyngeal edema.   Eyes: Conjunctivae and EOM are normal. Pupils are equal, round, and reactive to light.   Neck: Normal range of motion. Neck supple. No JVD present. No tracheal deviation present. No thyromegaly present.   Cardiovascular: Normal rate and regular rhythm.    Pulmonary/Chest: Effort normal and  breath sounds normal.   Musculoskeletal: Normal range of motion.   Lymphadenopathy:        Head (right side): No submental, no submandibular, no tonsillar, no preauricular, no posterior auricular and no occipital adenopathy present.        Head (left side): No submental, no submandibular, no tonsillar, no preauricular, no posterior auricular and no occipital adenopathy present.     She has no cervical adenopathy.        Right cervical: No superficial cervical, no deep cervical and no posterior cervical adenopathy present.       Left cervical: No superficial cervical, no deep cervical and no posterior cervical adenopathy present.   Neurological: She is alert and oriented to person, place, and time. No cranial nerve deficit.   Skin: Skin is warm.   Psychiatric: She has a normal mood and affect. Her speech is normal and behavior is normal. Thought content normal.   Nursing note and vitals reviewed.      The actual audiogram and tympanogram was reviewed by me there is no evidence of fluid or infection she has significant asymmetric hearing loss with elevated speech reception thresholds both sides much worse on the left and pure tone loss is worse particularly low frequencies in mid frequencies on the left versus the right with bilateral high-frequency hearing loss.      Assessment/Plan   Maude was seen today for ear problem.    Diagnoses and all orders for this visit:    Tinnitus of left ear    Sensorineural hearing loss of left ear     because a significant asymmetry will arrange an MRI to better evaluate was going on a told her she WOULD probably need a hearing aid 1 where the other we will rule out more severe, she is partially not have any neurologic symptoms such as balance of facial paralysis.  Her back in a few weeks after the MRI plan further treatment pending the results.

## 2017-04-28 NOTE — PROGRESS NOTES
STANDARD AUDIOMETRIC EVALUATION      Name:  Maude Joyner  :  1955  Age:  62 y.o.  Date of Evaluation:  2017      HISTORY    Reason for visit:  Maude Joyner is seen today for a hearing evaluation at the request of Dr. Chava Zaman.  Patient reports her hearing is getting worse in her left ear for the past 5 months.  She feels ear aches and ear pressure and noise that is getting louder.       EVALUATION    See Audiogram    RESULTS        Otoscopy and Tympanometry 226 Hz :  Right Ear:  Otoscopy:  Clear ear canal          Tympanometry:  Middle ear function within normal limits    Left Ear:   Otoscopy:  Clear ear canal        Tympanometry:  Middle ear function within normal limits    Test technique:  Standard Audiometry     Pure Tone Audiometry:   Patient responded to pure tones at 20-65 dB for 250-8000 Hz in right ear, and at 40-60 dB for 250-8000 Hz in left ear.       Speech Audiometry:        Right Ear:  Speech Reception Threshold (SRT) was obtained at 25 dBHL                 Speech Discrimination scores were 100% in quiet when words were presented at 65 dBHL       Left Ear:  Speech Reception Threshold (SRT) was obtained at 45 dBHL                 Speech Discrimination scores were 96% in quiet when words were presented at 85 dBHL    Reliability:   good    IMPRESSIONS:  1.  Tympanometry results are consistent with Middle ear function within normal limits in both ears.  2.  Pure tone results are consistent with normal to moderate sloping sensorineural hearing loss for right ear, and mild to moderate sloping sensorineural hearing loss  in left ear.       RECOMMENDATIONS:  Patient is seeing the Ear Nose and Throat physician immediately following this examination.  It was a pleasure seeing Maude Joyner in Audiology today.  We would be happy to do further testing or discuss these test as necessary.          This document has been electronically signed by Carolann Nicholas MS CCC-JASVIR on 2017 10:41  HANSEL Nicholas, MS CCC-A  Licensed Audiologist

## 2017-05-01 ENCOUNTER — HOSPITAL ENCOUNTER (OUTPATIENT)
Dept: MRI IMAGING | Facility: HOSPITAL | Age: 62
Discharge: HOME | End: 2017-05-01
Admitting: OTOLARYNGOLOGY

## 2017-05-01 DIAGNOSIS — H93.12 TINNITUS OF LEFT EAR: ICD-10-CM

## 2017-05-01 PROCEDURE — A9576 INJ PROHANCE MULTIPACK: HCPCS | Performed by: OTOLARYNGOLOGY

## 2017-05-01 PROCEDURE — 70553 MRI BRAIN STEM W/O & W/DYE: CPT

## 2017-05-01 PROCEDURE — 25010000002 GADOTERIDOL PER 1 ML: Performed by: OTOLARYNGOLOGY

## 2017-05-01 RX ADMIN — GADOTERIDOL 19 ML: 279.3 INJECTION, SOLUTION INTRAVENOUS at 13:39

## 2017-05-05 ENCOUNTER — TELEPHONE (OUTPATIENT)
Dept: OTOLARYNGOLOGY | Facility: CLINIC | Age: 62
End: 2017-05-05

## 2017-05-31 ENCOUNTER — OFFICE VISIT (OUTPATIENT)
Dept: OTOLARYNGOLOGY | Facility: CLINIC | Age: 62
End: 2017-05-31

## 2017-05-31 VITALS — HEIGHT: 67 IN | TEMPERATURE: 97.9 F | BODY MASS INDEX: 35.47 KG/M2 | WEIGHT: 226 LBS

## 2017-05-31 DIAGNOSIS — H90.5 SENSORINEURAL HEARING LOSS OF LEFT EAR: ICD-10-CM

## 2017-05-31 DIAGNOSIS — H93.12 TINNITUS OF LEFT EAR: Primary | ICD-10-CM

## 2017-05-31 PROCEDURE — 99213 OFFICE O/P EST LOW 20 MIN: CPT | Performed by: OTOLARYNGOLOGY

## 2017-05-31 RX ORDER — ALPRAZOLAM 0.5 MG/1
0.5 TABLET ORAL 2 TIMES DAILY
COMMUNITY
End: 2018-07-09

## 2017-11-02 ENCOUNTER — CLINICAL SUPPORT (OUTPATIENT)
Dept: AUDIOLOGY | Facility: CLINIC | Age: 62
End: 2017-11-02

## 2017-11-02 ENCOUNTER — OFFICE VISIT (OUTPATIENT)
Dept: OTOLARYNGOLOGY | Facility: CLINIC | Age: 62
End: 2017-11-02

## 2017-11-02 VITALS — TEMPERATURE: 97.8 F | WEIGHT: 226 LBS | BODY MASS INDEX: 35.47 KG/M2 | HEIGHT: 67 IN

## 2017-11-02 DIAGNOSIS — H93.12 TINNITUS OF LEFT EAR: Primary | ICD-10-CM

## 2017-11-02 DIAGNOSIS — H90.3 SENSORINEURAL HEARING LOSS, BILATERAL: Primary | ICD-10-CM

## 2017-11-02 DIAGNOSIS — H90.3 SENSORINEURAL HEARING LOSS (SNHL) OF BOTH EARS: ICD-10-CM

## 2017-11-02 PROCEDURE — 99213 OFFICE O/P EST LOW 20 MIN: CPT | Performed by: OTOLARYNGOLOGY

## 2017-11-02 RX ORDER — AMITRIPTYLINE HYDROCHLORIDE 10 MG/1
10 TABLET, FILM COATED ORAL NIGHTLY
Qty: 30 TABLET | Refills: 2 | Status: SHIPPED | OUTPATIENT
Start: 2017-11-02 | End: 2018-02-03 | Stop reason: SDUPTHER

## 2017-11-03 NOTE — PROGRESS NOTES
Subjective   Maude Joyner is a 62 y.o. female.   CC: f/u tinnitus and SNHL    History of Present Illness   Notes she still has bothersome tenderness until more so than it had been.  She's had asymmetric hearing hadn't MRI which is normal.  She's not had any eustachian tube problems has no ear drainage occasionally gets dizzy..  Adenoids is bothersome to her she has trouble sometimes understanding with  background noise      The following portions of the patient's history were reviewed and updated as appropriate: allergies, current medications, past family history, past medical history, past social history, past surgical history and problem list.      Current Outpatient Prescriptions:   •  acetaminophen (TYLENOL) 500 MG tablet, Take 1,000 mg by mouth Every 6 (Six) Hours As Needed for mild pain (1-3)., Disp: , Rfl:   •  ALPRAZolam (XANAX) 0.25 MG tablet, Take 0.5 mg by mouth 2 (Two) Times a Day As Needed for Anxiety., Disp: , Rfl:   •  cyclobenzaprine (FLEXERIL) 10 MG tablet, Take 10 mg by mouth 3 (Three) Times a Day As Needed for muscle spasms., Disp: , Rfl:   •  estradiol (ESTRACE) 0.5 MG tablet, Take 0.5 mg by mouth Daily., Disp: , Rfl:   •  fenofibrate 160 MG tablet, Take 54 mg by mouth Daily., Disp: , Rfl:   •  metoprolol tartrate (LOPRESSOR) 100 MG tablet, Take 100 mg by mouth 2 (Two) Times a Day., Disp: , Rfl:   •  omeprazole (priLOSEC) 20 MG capsule, Take 20 mg by mouth Daily., Disp: , Rfl:   •  triamcinolone (KENALOG) 0.5 % cream, Apply 1 application topically 3 (Three) Times a Day., Disp: , Rfl:   •  amitriptyline (ELAVIL) 10 MG tablet, Take 1 tablet by mouth Every Night., Disp: 30 tablet, Rfl: 2    Allergies   Allergen Reactions   • Ace Inhibitors    • Advil [Ibuprofen]    • Amitriptyline    • Doxycycline    • Elavil [Amitriptyline Hcl]    • Gabapentin    • Gemfibrozil    • Lortab [Hydrocodone-Acetaminophen]    • Morphine And Related    • Nickel    • Nucynta [Tapentadol Hcl Er]    • Other      Electrode  adhesive   • Oxycodone Hcl    • Septra [Sulfamethoxazole-Trimethoprim]    • Sulfa Antibiotics    • Talwin [Pentazocine]    • Topamax [Topiramate]           Review of Systems   Constitutional: Negative for fever.   HENT: Positive for hearing loss and tinnitus. Negative for ear discharge and ear pain.    Hematological: Negative for adenopathy.           Objective   Physical Exam   Constitutional: She is oriented to person, place, and time. She appears well-developed and well-nourished.   HENT:   Head: Normocephalic and atraumatic.   Right Ear: Hearing, tympanic membrane, external ear and ear canal normal.   Left Ear: Hearing, tympanic membrane, external ear and ear canal normal.   Nose: Nose normal. No mucosal edema, rhinorrhea, nasal deformity or septal deviation. No epistaxis. Right sinus exhibits no maxillary sinus tenderness and no frontal sinus tenderness. Left sinus exhibits no maxillary sinus tenderness and no frontal sinus tenderness.   Mouth/Throat: Uvula is midline, oropharynx is clear and moist and mucous membranes are normal. No trismus in the jaw. Normal dentition. No oropharyngeal exudate or posterior oropharyngeal edema.   Eyes: Conjunctivae and EOM are normal.   Neck: Normal range of motion. Neck supple. No JVD present. No tracheal deviation present. No thyromegaly present.   Cardiovascular: Normal rate.    Pulmonary/Chest: Effort normal.   Musculoskeletal: Normal range of motion.   Lymphadenopathy:        Head (right side): No submental, no submandibular, no tonsillar, no preauricular, no posterior auricular and no occipital adenopathy present.        Head (left side): No submental, no submandibular, no tonsillar, no preauricular, no posterior auricular and no occipital adenopathy present.     She has no cervical adenopathy.        Right cervical: No superficial cervical, no deep cervical and no posterior cervical adenopathy present.       Left cervical: No superficial cervical, no deep cervical and  no posterior cervical adenopathy present.   Neurological: She is alert and oriented to person, place, and time. No cranial nerve deficit.   Walks with cane   Skin: Skin is warm.   Psychiatric: She has a normal mood and affect. Her speech is normal and behavior is normal. Thought content normal.   Nursing note and vitals reviewed.      Her audiogram was reviewed in similar to her previous audiogram she is no evidence of fluid but has bilateral hearing loss slightly worse on the left ow frequencies in the right.  She'll tracings were reviewed with the patient   Maude was seen today for follow-up.    Diagnoses and all orders for this visit:    Tinnitus of left ear    Sensorineural hearing loss (SNHL) of both ears    Other orders  -     amitriptyline (ELAVIL) 10 MG tablet; Take 1 tablet by mouth Every Night.      Esterase strategies do with her hearing loss and tinnitus talked about hearing aids also discussed various medications that can be used to treat this.  She's already been on lipoflavonoid without success .  She says she's taken Elavil before and something bothered her about it she didn't have an allergic reaction she's willing to try low-dose again the significant help with her symptoms.  Short he takes sleeping medications are not recommend anything also sedate her.  This not improve her could refer first tinnitus therapy we also talked at practical and she can do at home at night sleeping help minimize this will see her back in 6 weeks.  She is to call for problems with the medications and I told her she can increase the dose after 10 days if she's not too drowsy or not have any side effects up to 20 mg at night .

## 2017-11-07 NOTE — PROGRESS NOTES
STANDARD AUDIOMETRIC EVALUATION      Name:  Maude Joyner  :  1955  Age:  62 y.o.  Date of Evaluation:  2017      HISTORY    Reason for visit:  Maude Joyner is seen today for a hearing evaluation at the request of Dr. Chava Zaman.  Patient reports she still has hearing loss in her left ear which she thinks is worse.  She states she gets dizzy, and she has a noise in her ears which is bothersome.       EVALUATION    See Audiogram    RESULTS        Otoscopy and Tympanometry 226 Hz :  Right Ear:  Otoscopy:  Clear ear canal          Tympanometry:  Middle ear function within normal limits    Left Ear:   Otoscopy:  Clear ear canal        Tympanometry:  Middle ear function within normal limits    Test technique:  Standard Audiometry     Pure Tone Audiometry:   Patient responded to pure tones at 25-70 dB for 250-8000 Hz in right ear, and at 35-70 dB for 250-8000 Hz in left ear.       Speech Audiometry:        Right Ear:  Speech Reception Threshold (SRT) was obtained at 30 dBHL                 Speech Discrimination scores were 80% in quiet when words were presented at 70 dBHL       Left Ear:  Speech Reception Threshold (SRT) was obtained at 45 dBHL                 Speech Discrimination scores were 64% in quiet when words were presented at 80 dBHL    Reliability:   good    IMPRESSIONS:  1.  Tympanometry results are consistent with Middle ear function within normal limits in both ears.  2.  Pure tone results are consistent with mild to severe sloping mianly sensorineural hearing loss for both ears.       RECOMMENDATIONS:  Patient is seeing the Ear Nose and Throat physician immediately following this examination.  It was a pleasure seeing Maude Joyner in Audiology today.  We would be happy to do further testing or discuss these test as necessary.          This document has been electronically signed by Carolann Nicholas MS CCC-JASVIR on 2017 10:33 AM       MS VINI Holder  Licensed  Audiologist

## 2017-12-04 ENCOUNTER — OFFICE VISIT (OUTPATIENT)
Dept: CARDIOLOGY | Facility: CLINIC | Age: 62
End: 2017-12-04

## 2017-12-04 ENCOUNTER — DOCUMENTATION (OUTPATIENT)
Dept: CARDIOLOGY | Facility: CLINIC | Age: 62
End: 2017-12-04

## 2017-12-04 VITALS
DIASTOLIC BLOOD PRESSURE: 100 MMHG | HEIGHT: 67 IN | SYSTOLIC BLOOD PRESSURE: 154 MMHG | HEART RATE: 74 BPM | BODY MASS INDEX: 33.74 KG/M2 | WEIGHT: 215 LBS

## 2017-12-04 DIAGNOSIS — I10 ESSENTIAL HYPERTENSION: Primary | ICD-10-CM

## 2017-12-04 DIAGNOSIS — I20.9 ANGINA PECTORIS (HCC): ICD-10-CM

## 2017-12-04 DIAGNOSIS — R06.02 SOB (SHORTNESS OF BREATH): ICD-10-CM

## 2017-12-04 DIAGNOSIS — E78.1 PURE HYPERGLYCERIDEMIA: ICD-10-CM

## 2017-12-04 PROCEDURE — 93000 ELECTROCARDIOGRAM COMPLETE: CPT | Performed by: INTERNAL MEDICINE

## 2017-12-04 PROCEDURE — 99204 OFFICE O/P NEW MOD 45 MIN: CPT | Performed by: INTERNAL MEDICINE

## 2017-12-04 RX ORDER — TRAMADOL HYDROCHLORIDE 50 MG/1
50 TABLET ORAL 2 TIMES DAILY
COMMUNITY

## 2017-12-04 RX ORDER — IRBESARTAN AND HYDROCHLOROTHIAZIDE 150; 12.5 MG/1; MG/1
1 TABLET, FILM COATED ORAL DAILY
Qty: 30 TABLET | Refills: 6 | Status: SHIPPED | OUTPATIENT
Start: 2017-12-04 | End: 2018-05-07 | Stop reason: SDUPTHER

## 2017-12-04 RX ORDER — METOPROLOL SUCCINATE 100 MG/1
50 TABLET, EXTENDED RELEASE ORAL
COMMUNITY
End: 2018-07-09 | Stop reason: SDUPTHER

## 2017-12-04 RX ORDER — TEMAZEPAM 30 MG/1
30 CAPSULE ORAL NIGHTLY PRN
COMMUNITY
End: 2018-04-16

## 2017-12-04 RX ORDER — SERTRALINE HYDROCHLORIDE 25 MG/1
25 TABLET, FILM COATED ORAL DAILY
COMMUNITY
End: 2018-04-16

## 2017-12-04 NOTE — PROGRESS NOTES
[facility] Cardiology  OFFICE NOTE    Maude Joyner  62 y.o. female    12/04/2017  1. Essential hypertension    2. SOB (shortness of breath)    3. Pure hyperglyceridemia    4. Angina pectoris        Chief complaint -Shortness of breath with chest tightness and fatigue      History of present Illness- 62-year-old lady who is been disabled after she got Lyme disease and has been on multiple medicines and her blood pressure has been very labile and unable to be controlled and she is getting significant shortness of breath with fatigue and chest tightness and has to rest when it goes away.  Her dad had CAD at the age of 50.  She did not smoke or use any form of tobacco.  She denies any cardiac problems in the past.  She denies any GI symptoms.  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        Allergies   Allergen Reactions   • Ace Inhibitors    • Advil [Ibuprofen]    • Amitriptyline    • Doxycycline    • Elavil [Amitriptyline Hcl]    • Gabapentin    • Gemfibrozil    • Lortab [Hydrocodone-Acetaminophen]    • Morphine And Related    • Nickel    • Nucynta [Tapentadol Hcl Er]    • Other      Electrode adhesive   • Oxycodone Hcl    • Septra [Sulfamethoxazole-Trimethoprim]    • Sulfa Antibiotics    • Talwin [Pentazocine]    • Topamax [Topiramate]          Past Medical History:   Diagnosis Date   • Dysuria    • Fibromyalgia    • GERD (gastroesophageal reflux disease)    • Hyperlipidemia    • Hypertension    • Urinary tract infectious disease          Past Surgical History:   Procedure Laterality Date   • BACK SURGERY     • HYSTERECTOMY     • KNEE ARTHROPLASTY     • SINUS SURGERY           Family History   Problem Relation Age of Onset   • Heart disease Mother    • Hypertension Mother    • Cancer Father    • Heart disease Father    • Hypertension Father          Social History     Social History   • Marital status:      Spouse name: N/A   • Number of  children: N/A   • Years of education: N/A     Occupational History   • Not on file.     Social History Main Topics   • Smoking status: Former Smoker   • Smokeless tobacco: Never Used   • Alcohol use No   • Drug use: No   • Sexual activity: Defer     Other Topics Concern   • Not on file     Social History Narrative         Current Outpatient Prescriptions   Medication Sig Dispense Refill   • acetaminophen (TYLENOL) 500 MG tablet Take 1,000 mg by mouth Every 6 (Six) Hours As Needed for mild pain (1-3).     • ALPRAZolam (XANAX) 0.25 MG tablet Take 0.5 mg by mouth 2 (Two) Times a Day As Needed for Anxiety.     • amitriptyline (ELAVIL) 10 MG tablet Take 1 tablet by mouth Every Night. 30 tablet 2   • estradiol (ESTRACE) 0.5 MG tablet Take 0.5 mg by mouth Daily.     • fenofibrate 160 MG tablet Take 54 mg by mouth Daily.     • metoprolol succinate XL (TOPROL-XL) 100 MG 24 hr tablet Take 50 mg by mouth Daily With Dinner.     • omeprazole (priLOSEC) 20 MG capsule Take 20 mg by mouth Daily.     • sertraline (ZOLOFT) 25 MG tablet Take 25 mg by mouth Daily.     • temazepam (RESTORIL) 30 MG capsule Take 30 mg by mouth At Night As Needed for Sleep.     • traMADol (ULTRAM) 50 MG tablet Take 50 mg by mouth Every 6 (Six) Hours As Needed for Moderate Pain .     • triamcinolone (KENALOG) 0.5 % cream Apply 1 application topically 3 (Three) Times a Day.     • irbesartan-hydrochlorothiazide (AVALIDE) 150-12.5 MG tablet Take 1 tablet by mouth Daily. 30 tablet 6     No current facility-administered medications for this visit.          Review of Systems     Constitution: Generalized fatigue and tiredness    HENT: Denies any headache, hearing impairment,     Eyes: Denies any blurring of vision, or photophobia     Cardivascular - As per history of present illness     Respiratory system-shortness of breath NYHA class IIb        Endocrine: Hypertriglyceridemia       Musculoskeletal:   history of arthritis with musculoskeletal  "problems    Gastrointestinal: No nausea, vomiting, or melena    Genitourinary: No dysuria or hematuria    Neurological:   No history of seizure disorder, stroke, memory problems    Psychiatric/Behavioral:         history of depression    Hematological- no history of easy bruising or any bleeding diathesis            OBJECTIVE    /100  Pulse 74  Ht 66.5\" (168.9 cm)  Wt 215 lb (97.5 kg)  LMP 05/01/2004 (Approximate)  BMI 34.18 kg/m2      Physical Exam     Constitutional: is oriented to person, place, and time.     Skin-warm and dry    Well developed and nourished     Head: Normocephalic and atraumatic.     Eyes: Pupils are equal    Neck: Neck supple. No bruit in the carotids, no elevation of JVD    Cardiovascular: Clay in the fifth intercostal space   Regular rate, and  Rhythm,    S1 greater than S2, no S3 or S4, no gallop     Pulmonary/Chest:   Air  Entry is equal on both sides  No wheezing or crackles,      Abdominal: Soft.  No hepatosplenomegaly, bowel sounds are present    Musculoskeletal: No kyphoscoliosis, no significant thickening of the joints    Neurological: is alert and oriented to person, place, and time.    cranial nerve are intact .   No motor or sensory deficit    Extremities-no edema, no radial femoral delay      Psychiatric: He has a normal mood and affect.                  His behavior is normal.             ECG 12 Lead  Date/Time: 12/4/2017 11:58 AM  Performed by: ANGELIA GOSS  Authorized by: ANGELIA GOSS   Comparison: not compared with previous ECG   Rhythm: sinus rhythm  Clinical impression: normal ECG                         A/P    Shortness of breath NYHA class IIb with chest tightness and fatigue-intermediate risk for CAD with uncontrolled hypertension and hypertriglyceridemia with a BMI of 34.4.  She never smoked has family history of CAD in her dad prematurely.  We will do a CT coronary angiogram to rule out any obstructive CAD.    Hypertension not very well " controlled decrease the dose of Toprol-XL to 50 mg in the evening added Avalide in the morning as her blood pressure is not very well controlled, and she feels very tired with Toprol- mg which she is taking currently.    Hypertriglyceridemia on fenofibrate and her triglycerides are okay.  Her LDL is 149.    We'll check an echo to assess LV function and left ventricular wall thickness and will follow-up with me in 4 weeks            This document has been electronically signed by Robbie Boothe MD on December 4, 2017 11:55 AM       EMR Dragon/Transcription disclaimer:   Some of this note may be an electronic transcription/translation of spoken language to printed text. The electronic translation of spoken language may permit erroneous, or at times, nonsensical words or phrases to be inadvertently transcribed; Although I have reviewed the note for such errors, some may still exist.

## 2017-12-04 NOTE — PROGRESS NOTES
PT scheduled for CTA, instructions given, verbalized understanding. PT states is not allergic to IVP dye

## 2017-12-12 LAB
BH CV ECHO MEAS - ACS: 1.9 CM
BH CV ECHO MEAS - AO MAX PG (FULL): 0.93 MMHG
BH CV ECHO MEAS - AO MAX PG: 7.2 MMHG
BH CV ECHO MEAS - AO MEAN PG (FULL): 2 MMHG
BH CV ECHO MEAS - AO MEAN PG: 5 MMHG
BH CV ECHO MEAS - AO ROOT AREA: 5.7 CM^2
BH CV ECHO MEAS - AO ROOT DIAM: 3.7 CM
BH CV ECHO MEAS - AO V2 MAX: 134 CM/SEC
BH CV ECHO MEAS - AO V2 MEAN: 101 CM/SEC
BH CV ECHO MEAS - AO V2 VTI: 30.2 CM
BH CV ECHO MEAS - AVA(I,A): 2.2 CM^2
BH CV ECHO MEAS - AVA(I,D): 2.2 CM^2
BH CV ECHO MEAS - AVA(V,A): 2.1 CM^2
BH CV ECHO MEAS - AVA(V,D): 2.1 CM^2
BH CV ECHO MEAS - EDV(CUBED): 35.9 ML
BH CV ECHO MEAS - EDV(TEICH): 44.1 ML
BH CV ECHO MEAS - EF(CUBED): 56.5 %
BH CV ECHO MEAS - EF(TEICH): 49.4 %
BH CV ECHO MEAS - EPSS: 0.2 CM
BH CV ECHO MEAS - ESV(CUBED): 15.6 ML
BH CV ECHO MEAS - ESV(TEICH): 22.3 ML
BH CV ECHO MEAS - FS: 24.2 %
BH CV ECHO MEAS - IVS/LVPW: 0.89
BH CV ECHO MEAS - IVSD: 1.6 CM
BH CV ECHO MEAS - LA DIMENSION: 3.4 CM
BH CV ECHO MEAS - LA/AO: 1.3
BH CV ECHO MEAS - LV MASS(C)D: 220.9 GRAMS
BH CV ECHO MEAS - LV MAX PG: 6.3 MMHG
BH CV ECHO MEAS - LV MEAN PG: 3 MMHG
BH CV ECHO MEAS - LV V1 MAX: 125 CM/SEC
BH CV ECHO MEAS - LV V1 MEAN: 81.4 CM/SEC
BH CV ECHO MEAS - LV V1 VTI: 29.1 CM
BH CV ECHO MEAS - LVIDD: 3.3 CM
BH CV ECHO MEAS - LVIDS: 2.5 CM
BH CV ECHO MEAS - LVOT AREA (M): 2.3 CM^2
BH CV ECHO MEAS - LVOT AREA: 2.3 CM^2
BH CV ECHO MEAS - LVOT DIAM: 1.7 CM
BH CV ECHO MEAS - LVPWD: 1.8 CM
BH CV ECHO MEAS - MR MAX PG: 41.2 MMHG
BH CV ECHO MEAS - MR MAX VEL: 321 CM/SEC
BH CV ECHO MEAS - MV A MAX VEL: 91.3 CM/SEC
BH CV ECHO MEAS - MV E MAX VEL: 61.7 CM/SEC
BH CV ECHO MEAS - MV E/A: 0.68
BH CV ECHO MEAS - PA MAX PG: 6.2 MMHG
BH CV ECHO MEAS - PA MEAN PG: 3 MMHG
BH CV ECHO MEAS - PA V2 MAX: 124 CM/SEC
BH CV ECHO MEAS - PA V2 MEAN: 79 CM/SEC
BH CV ECHO MEAS - PA V2 VTI: 26.9 CM
BH CV ECHO MEAS - PI END-D VEL: 115 CM/SEC
BH CV ECHO MEAS - RAP SYSTOLE: 10 MMHG
BH CV ECHO MEAS - RVSP: 30 MMHG
BH CV ECHO MEAS - SV(AO): 172.9 ML
BH CV ECHO MEAS - SV(CUBED): 20.3 ML
BH CV ECHO MEAS - SV(LVOT): 66.1 ML
BH CV ECHO MEAS - SV(TEICH): 21.8 ML
BH CV ECHO MEAS - TR MAX VEL: 223.3 CM/SEC
LV EF 2D ECHO EST: 65 %

## 2017-12-14 ENCOUNTER — HOSPITAL ENCOUNTER (OUTPATIENT)
Dept: CT IMAGING | Facility: HOSPITAL | Age: 62
Discharge: HOME | End: 2017-12-14
Admitting: INTERNAL MEDICINE

## 2017-12-14 ENCOUNTER — LAB (OUTPATIENT)
Dept: LAB | Facility: HOSPITAL | Age: 62
End: 2017-12-14
Attending: INTERNAL MEDICINE

## 2017-12-14 DIAGNOSIS — I10 ESSENTIAL HYPERTENSION: ICD-10-CM

## 2017-12-14 LAB
ALBUMIN SERPL-MCNC: 4.4 G/DL (ref 3.4–4.8)
ALBUMIN/GLOB SERPL: 1.2 G/DL (ref 1.1–1.8)
ALP SERPL-CCNC: 80 U/L (ref 38–126)
ALT SERPL W P-5'-P-CCNC: 31 U/L (ref 9–52)
ANION GAP SERPL CALCULATED.3IONS-SCNC: 12 MMOL/L (ref 5–15)
AST SERPL-CCNC: 31 U/L (ref 14–36)
BILIRUB SERPL-MCNC: 0.6 MG/DL (ref 0.2–1.3)
BUN BLD-MCNC: 24 MG/DL (ref 7–21)
BUN/CREAT SERPL: 20.2 (ref 7–25)
CALCIUM SPEC-SCNC: 9.9 MG/DL (ref 8.4–10.2)
CHLORIDE SERPL-SCNC: 99 MMOL/L (ref 95–110)
CO2 SERPL-SCNC: 27 MMOL/L (ref 22–31)
CREAT BLD-MCNC: 1.19 MG/DL (ref 0.5–1)
GFR SERPL CREATININE-BSD FRML MDRD: 46 ML/MIN/1.73 (ref 45–104)
GLOBULIN UR ELPH-MCNC: 3.7 GM/DL (ref 2.3–3.5)
GLUCOSE BLD-MCNC: 95 MG/DL (ref 60–100)
POTASSIUM BLD-SCNC: 5 MMOL/L (ref 3.5–5.1)
PROT SERPL-MCNC: 8.1 G/DL (ref 6.3–8.6)
SODIUM BLD-SCNC: 138 MMOL/L (ref 137–145)

## 2017-12-14 PROCEDURE — 75574 CT ANGIO HRT W/3D IMAGE: CPT

## 2017-12-14 PROCEDURE — 80053 COMPREHEN METABOLIC PANEL: CPT

## 2017-12-14 PROCEDURE — 36415 COLL VENOUS BLD VENIPUNCTURE: CPT

## 2017-12-14 PROCEDURE — 0 IOPAMIDOL PER 1 ML: Performed by: INTERNAL MEDICINE

## 2017-12-14 RX ADMIN — IOPAMIDOL 80 ML: 755 INJECTION, SOLUTION INTRAVENOUS at 11:30

## 2017-12-15 ENCOUNTER — TELEPHONE (OUTPATIENT)
Dept: CARDIOLOGY | Facility: CLINIC | Age: 62
End: 2017-12-15

## 2018-02-05 RX ORDER — AMITRIPTYLINE HYDROCHLORIDE 10 MG/1
TABLET, FILM COATED ORAL
Qty: 30 TABLET | Refills: 0 | Status: SHIPPED | OUTPATIENT
Start: 2018-02-05 | End: 2018-04-16

## 2018-02-05 NOTE — TELEPHONE ENCOUNTER
We'll refill one time only.  Patient was supposed to have seen Dr. Zaman for follow-up and has not done so yet.

## 2018-05-07 RX ORDER — IRBESARTAN AND HYDROCHLOROTHIAZIDE 150; 12.5 MG/1; MG/1
1 TABLET, FILM COATED ORAL DAILY
Qty: 90 TABLET | Refills: 2 | Status: SHIPPED | OUTPATIENT
Start: 2018-05-07 | End: 2018-05-26 | Stop reason: HOSPADM

## 2018-05-24 ENCOUNTER — HOSPITAL ENCOUNTER (OUTPATIENT)
Facility: HOSPITAL | Age: 63
Setting detail: OBSERVATION
Discharge: HOME | End: 2018-05-26
Attending: EMERGENCY MEDICINE | Admitting: INTERNAL MEDICINE

## 2018-05-24 ENCOUNTER — APPOINTMENT (OUTPATIENT)
Dept: GENERAL RADIOLOGY | Facility: HOSPITAL | Age: 63
End: 2018-05-24

## 2018-05-24 DIAGNOSIS — N18.9 CHRONIC KIDNEY DISEASE, UNSPECIFIED CKD STAGE: ICD-10-CM

## 2018-05-24 DIAGNOSIS — R07.9 CHEST PAIN, UNSPECIFIED TYPE: Primary | ICD-10-CM

## 2018-05-24 DIAGNOSIS — R42 POSTURAL DIZZINESS WITH PRESYNCOPE: ICD-10-CM

## 2018-05-24 DIAGNOSIS — R42 DIZZINESS: ICD-10-CM

## 2018-05-24 DIAGNOSIS — R55 POSTURAL DIZZINESS WITH PRESYNCOPE: ICD-10-CM

## 2018-05-24 LAB
ALBUMIN SERPL-MCNC: 4.1 G/DL (ref 3.4–4.8)
ALBUMIN/GLOB SERPL: 1.2 G/DL (ref 1.1–1.8)
ALP SERPL-CCNC: 69 U/L (ref 38–126)
ALT SERPL W P-5'-P-CCNC: 36 U/L (ref 9–52)
ANION GAP SERPL CALCULATED.3IONS-SCNC: 11 MMOL/L (ref 5–15)
APTT PPP: 28 SECONDS (ref 20–40.3)
AST SERPL-CCNC: 35 U/L (ref 14–36)
BASOPHILS # BLD AUTO: 0.06 10*3/MM3 (ref 0–0.2)
BASOPHILS NFR BLD AUTO: 0.8 % (ref 0–2)
BILIRUB SERPL-MCNC: 0.4 MG/DL (ref 0.2–1.3)
BUN BLD-MCNC: 39 MG/DL (ref 7–21)
BUN/CREAT SERPL: 31.7 (ref 7–25)
CALCIUM SPEC-SCNC: 9.5 MG/DL (ref 8.4–10.2)
CHLORIDE SERPL-SCNC: 103 MMOL/L (ref 95–110)
CK MB SERPL-CCNC: 1.78 NG/ML (ref 0–5)
CK SERPL-CCNC: 106 U/L (ref 30–135)
CO2 SERPL-SCNC: 24 MMOL/L (ref 22–31)
CREAT BLD-MCNC: 1.23 MG/DL (ref 0.5–1)
D-DIMER, QUANTITATIVE (MAD,POW, STR): 369 NG/ML (FEU) (ref 0–470)
DEPRECATED RDW RBC AUTO: 42.4 FL (ref 36.4–46.3)
EOSINOPHIL # BLD AUTO: 0.16 10*3/MM3 (ref 0–0.7)
EOSINOPHIL NFR BLD AUTO: 2.1 % (ref 0–7)
ERYTHROCYTE [DISTWIDTH] IN BLOOD BY AUTOMATED COUNT: 13.3 % (ref 11.5–14.5)
GFR SERPL CREATININE-BSD FRML MDRD: 44 ML/MIN/1.73 (ref 45–104)
GLOBULIN UR ELPH-MCNC: 3.4 GM/DL (ref 2.3–3.5)
GLUCOSE BLD-MCNC: 93 MG/DL (ref 60–100)
HCT VFR BLD AUTO: 40 % (ref 35–45)
HGB BLD-MCNC: 13.7 G/DL (ref 12–15.5)
HOLD SPECIMEN: NORMAL
HOLD SPECIMEN: NORMAL
IMM GRANULOCYTES # BLD: 0.07 10*3/MM3 (ref 0–0.02)
IMM GRANULOCYTES NFR BLD: 0.9 % (ref 0–0.5)
INR PPP: 1.02 (ref 0.8–1.2)
LYMPHOCYTES # BLD AUTO: 2.21 10*3/MM3 (ref 0.6–4.2)
LYMPHOCYTES NFR BLD AUTO: 28.4 % (ref 10–50)
MCH RBC QN AUTO: 29.8 PG (ref 26.5–34)
MCHC RBC AUTO-ENTMCNC: 34.3 G/DL (ref 31.4–36)
MCV RBC AUTO: 87.1 FL (ref 80–98)
MONOCYTES # BLD AUTO: 0.86 10*3/MM3 (ref 0–0.9)
MONOCYTES NFR BLD AUTO: 11.1 % (ref 0–12)
NEUTROPHILS # BLD AUTO: 4.42 10*3/MM3 (ref 2–8.6)
NEUTROPHILS NFR BLD AUTO: 56.7 % (ref 37–80)
NT-PROBNP SERPL-MCNC: 138 PG/ML (ref 0–900)
PLATELET # BLD AUTO: 301 10*3/MM3 (ref 150–450)
PMV BLD AUTO: 10.5 FL (ref 8–12)
POTASSIUM BLD-SCNC: 4.1 MMOL/L (ref 3.5–5.1)
PROT SERPL-MCNC: 7.5 G/DL (ref 6.3–8.6)
PROTHROMBIN TIME: 13.2 SECONDS (ref 11.1–15.3)
RBC # BLD AUTO: 4.59 10*6/MM3 (ref 3.77–5.16)
SODIUM BLD-SCNC: 138 MMOL/L (ref 137–145)
TROPONIN I SERPL-MCNC: <0.012 NG/ML
WBC NRBC COR # BLD: 7.78 10*3/MM3 (ref 3.2–9.8)
WHOLE BLOOD HOLD SPECIMEN: NORMAL
WHOLE BLOOD HOLD SPECIMEN: NORMAL

## 2018-05-24 PROCEDURE — 82553 CREATINE MB FRACTION: CPT | Performed by: EMERGENCY MEDICINE

## 2018-05-24 PROCEDURE — 84484 ASSAY OF TROPONIN QUANT: CPT | Performed by: EMERGENCY MEDICINE

## 2018-05-24 PROCEDURE — 93010 ELECTROCARDIOGRAM REPORT: CPT | Performed by: INTERNAL MEDICINE

## 2018-05-24 PROCEDURE — 80053 COMPREHEN METABOLIC PANEL: CPT | Performed by: EMERGENCY MEDICINE

## 2018-05-24 PROCEDURE — 85379 FIBRIN DEGRADATION QUANT: CPT | Performed by: EMERGENCY MEDICINE

## 2018-05-24 PROCEDURE — 71046 X-RAY EXAM CHEST 2 VIEWS: CPT

## 2018-05-24 PROCEDURE — G0378 HOSPITAL OBSERVATION PER HR: HCPCS

## 2018-05-24 PROCEDURE — 85730 THROMBOPLASTIN TIME PARTIAL: CPT | Performed by: EMERGENCY MEDICINE

## 2018-05-24 PROCEDURE — 82550 ASSAY OF CK (CPK): CPT | Performed by: EMERGENCY MEDICINE

## 2018-05-24 PROCEDURE — 96372 THER/PROPH/DIAG INJ SC/IM: CPT

## 2018-05-24 PROCEDURE — 93005 ELECTROCARDIOGRAM TRACING: CPT | Performed by: EMERGENCY MEDICINE

## 2018-05-24 PROCEDURE — 99285 EMERGENCY DEPT VISIT HI MDM: CPT

## 2018-05-24 PROCEDURE — 85025 COMPLETE CBC W/AUTO DIFF WBC: CPT | Performed by: EMERGENCY MEDICINE

## 2018-05-24 PROCEDURE — 85610 PROTHROMBIN TIME: CPT | Performed by: EMERGENCY MEDICINE

## 2018-05-24 PROCEDURE — 25010000002 ENOXAPARIN PER 10 MG: Performed by: INTERNAL MEDICINE

## 2018-05-24 PROCEDURE — 93005 ELECTROCARDIOGRAM TRACING: CPT

## 2018-05-24 PROCEDURE — 83880 ASSAY OF NATRIURETIC PEPTIDE: CPT | Performed by: EMERGENCY MEDICINE

## 2018-05-24 RX ORDER — PANTOPRAZOLE SODIUM 40 MG/1
40 TABLET, DELAYED RELEASE ORAL EVERY MORNING
Status: DISCONTINUED | OUTPATIENT
Start: 2018-05-25 | End: 2018-05-26 | Stop reason: HOSPADM

## 2018-05-24 RX ORDER — SODIUM CHLORIDE 0.9 % (FLUSH) 0.9 %
1-10 SYRINGE (ML) INJECTION AS NEEDED
Status: DISCONTINUED | OUTPATIENT
Start: 2018-05-24 | End: 2018-05-26 | Stop reason: HOSPADM

## 2018-05-24 RX ORDER — SODIUM CHLORIDE 0.9 % (FLUSH) 0.9 %
10 SYRINGE (ML) INJECTION AS NEEDED
Status: DISCONTINUED | OUTPATIENT
Start: 2018-05-24 | End: 2018-05-26 | Stop reason: HOSPADM

## 2018-05-24 RX ORDER — BETAMETHASONE DIPROPIONATE 0.5 MG/G
CREAM TOPICAL
Status: DISCONTINUED | OUTPATIENT
Start: 2018-05-25 | End: 2018-05-26 | Stop reason: HOSPADM

## 2018-05-24 RX ORDER — METOPROLOL SUCCINATE 50 MG/1
50 TABLET, EXTENDED RELEASE ORAL
Status: DISCONTINUED | OUTPATIENT
Start: 2018-05-24 | End: 2018-05-26 | Stop reason: HOSPADM

## 2018-05-24 RX ORDER — NITROGLYCERIN 0.4 MG/1
0.4 TABLET SUBLINGUAL
Status: DISCONTINUED | OUTPATIENT
Start: 2018-05-24 | End: 2018-05-26 | Stop reason: HOSPADM

## 2018-05-24 RX ORDER — SODIUM CHLORIDE 9 MG/ML
50 INJECTION, SOLUTION INTRAVENOUS CONTINUOUS
Status: DISCONTINUED | OUTPATIENT
Start: 2018-05-24 | End: 2018-05-26 | Stop reason: HOSPADM

## 2018-05-24 RX ORDER — ALPRAZOLAM 0.5 MG/1
0.5 TABLET ORAL 2 TIMES DAILY
Status: DISCONTINUED | OUTPATIENT
Start: 2018-05-24 | End: 2018-05-26 | Stop reason: HOSPADM

## 2018-05-24 RX ORDER — ESTRADIOL 0.5 MG/1
0.5 TABLET ORAL DAILY
Status: DISCONTINUED | OUTPATIENT
Start: 2018-05-25 | End: 2018-05-26 | Stop reason: HOSPADM

## 2018-05-24 RX ORDER — FENOFIBRATE 145 MG/1
145 TABLET, COATED ORAL NIGHTLY
Status: DISCONTINUED | OUTPATIENT
Start: 2018-05-24 | End: 2018-05-26 | Stop reason: HOSPADM

## 2018-05-24 RX ORDER — ASPIRIN 81 MG/1
324 TABLET, CHEWABLE ORAL ONCE
Status: COMPLETED | OUTPATIENT
Start: 2018-05-24 | End: 2018-05-24

## 2018-05-24 RX ORDER — TRAMADOL HYDROCHLORIDE 50 MG/1
50 TABLET ORAL 2 TIMES DAILY
Status: DISCONTINUED | OUTPATIENT
Start: 2018-05-24 | End: 2018-05-26 | Stop reason: HOSPADM

## 2018-05-24 RX ADMIN — SODIUM CHLORIDE 125 ML/HR: 900 INJECTION, SOLUTION INTRAVENOUS at 15:43

## 2018-05-24 RX ADMIN — METOPROLOL SUCCINATE 50 MG: 50 TABLET, EXTENDED RELEASE ORAL at 20:30

## 2018-05-24 RX ADMIN — ENOXAPARIN SODIUM 30 MG: 30 INJECTION SUBCUTANEOUS at 20:30

## 2018-05-24 RX ADMIN — ASPIRIN 81 MG 324 MG: 81 TABLET ORAL at 15:42

## 2018-05-24 RX ADMIN — ALPRAZOLAM 0.5 MG: 0.5 TABLET ORAL at 20:30

## 2018-05-24 RX ADMIN — FENOFIBRATE 145 MG: 145 TABLET ORAL at 20:30

## 2018-05-24 RX ADMIN — TRAMADOL HYDROCHLORIDE 50 MG: 50 TABLET, FILM COATED ORAL at 20:30

## 2018-05-24 NOTE — ED PROVIDER NOTES
Subjective   62yo female pmh significant htn/hyperlipidemia/fibromyalgia presents ED c/o episode exertional substernal chest pain 05.23.2018 2230hrs, characterized as 'sharp'/nonradiating/relieved rest/assoc diaphoresis, fatigue, soa, palpitations; pt denies chest pain at time of exam.  Pt seen pmd clinic earlier today for same; referred to ED for further evaluation.        History provided by:  Patient  Chest Pain   Pain location:  Substernal area  Pain quality: sharp    Pain radiates to:  Does not radiate  Pain severity:  Moderate  Onset quality:  Sudden  Progression:  Resolved  Associated symptoms: palpitations and shortness of breath        Review of Systems   Constitutional: Negative.    HENT: Negative.    Eyes: Negative.    Respiratory: Positive for shortness of breath.    Cardiovascular: Positive for chest pain and palpitations.   Gastrointestinal: Negative.    Endocrine: Negative.    Genitourinary: Negative.    Musculoskeletal: Negative.    Skin: Negative.    Neurological: Positive for light-headedness.       Past Medical History:   Diagnosis Date   • Dysuria    • Fibromyalgia    • GERD (gastroesophageal reflux disease)    • Hyperlipidemia    • Hypertension    • Urinary tract infectious disease        Allergies   Allergen Reactions   • Ace Inhibitors    • Advil [Ibuprofen]    • Amitriptyline    • Doxycycline    • Elavil [Amitriptyline Hcl]    • Gabapentin    • Gemfibrozil    • Lortab [Hydrocodone-Acetaminophen]    • Morphine And Related    • Nickel    • Nucynta [Tapentadol Hcl Er]    • Other      Electrode adhesive   • Oxycodone Hcl    • Septra [Sulfamethoxazole-Trimethoprim]    • Sulfa Antibiotics    • Talwin [Pentazocine]    • Topamax [Topiramate]        Past Surgical History:   Procedure Laterality Date   • BACK SURGERY     • HYSTERECTOMY     • KNEE ARTHROPLASTY     • SINUS SURGERY         Family History   Problem Relation Age of Onset   • Heart disease Mother    • Hypertension Mother    • Cancer Father     • Heart disease Father    • Hypertension Father        Social History     Social History   • Marital status:      Social History Main Topics   • Smoking status: Former Smoker   • Smokeless tobacco: Never Used   • Alcohol use No   • Drug use: No   • Sexual activity: Defer     Other Topics Concern   • Not on file           Objective   Physical Exam   Constitutional: She is oriented to person, place, and time. She appears well-developed and well-nourished.   HENT:   Head: Normocephalic and atraumatic.   Mouth/Throat: Oropharynx is clear and moist.   Eyes: Pupils are equal, round, and reactive to light.   Neck: Neck supple. No JVD present. No tracheal deviation present.   Cardiovascular: Normal rate, regular rhythm, normal heart sounds and intact distal pulses.  Exam reveals no gallop and no friction rub.    No murmur heard.  Pulmonary/Chest: Effort normal and breath sounds normal. She has no wheezes. She has no rales.   Abdominal: Soft. Bowel sounds are normal. She exhibits no mass. There is no tenderness. There is no rebound and no guarding.   Musculoskeletal: She exhibits no edema.   Lymphadenopathy:     She has no cervical adenopathy.   Neurological: She is alert and oriented to person, place, and time.   Skin: Skin is warm and dry.   Nursing note and vitals reviewed.      ECG 12 Lead    Date/Time: 5/24/2018 4:45 PM  Performed by: JIM BAUER  Authorized by: JIM BAUER   Interpreted by physician  Rhythm: sinus rhythm  Rate: normal  BPM: 63  QRS axis: normal  Conduction: conduction normal  ST Segments: ST segments normal  T Waves: T waves normal  Q waves: III and aVF  Clinical impression: abnormal ECG                 ED Course      Labs Reviewed   COMPREHENSIVE METABOLIC PANEL - Abnormal; Notable for the following:        Result Value    BUN 39 (*)     Creatinine 1.23 (*)     eGFR Non African Amer 44 (*)     BUN/Creatinine Ratio 31.7 (*)     All other components within normal limits   CBC WITH AUTO  DIFFERENTIAL - Abnormal; Notable for the following:     Immature Grans % 0.9 (*)     Immature Grans, Absolute 0.07 (*)     All other components within normal limits   TROPONIN (IN-HOUSE) - Normal   BNP (IN-HOUSE) - Normal   PROTIME-INR - Normal    Narrative:     Therapeutic range for most indications is 2.0-3.0 INR,  or 2.5-3.5 for mechanical heart valves.   APTT - Normal    Narrative:     The recommended Heparin therapeutic range is 68-97 seconds.   CK - Normal   CK MB - Normal   D-DIMER, QUANTITATIVE - Normal    Narrative:     Dimer values <500 ng/ml FEU are FDA approved as aid in diagnosis of deep venous thrombosis and pulmonary embolism.  This test should not be used in an exclusion strategy with pretest probability alone.    A recent guideline regarding diagnosis for pulmonary thomboembolism recommends an adjusted exclusion criterion of age x 10 ng/ml FEU for patients >50 years of age ([employer] Intern Med 2015; 163: 701-711).   RAINBOW DRAW    Narrative:     The following orders were created for panel order [place] Draw.  Procedure                               Abnormality         Status                     ---------                               -----------         ------                     Light Blue Top[[id number]]                                   Final result               Green Top (Gel)[[id number]]                                  Final result               Lavender Top[[id number]]                                     Final result               Gold Top - SST[[id number]]                                   Final result                 Please view results for these tests on the individual orders.   TROPONIN (IN-HOUSE)   TROPONIN (IN-HOUSE)   TROPONIN (IN-HOUSE)   CBC AND DIFFERENTIAL    Narrative:     The following orders were created for panel order CBC & Differential.  Procedure                               Abnormality         Status                     ---------                               -----------          ------                     CBC Auto Differential[[id number]]        Abnormal            Final result                 Please view results for these tests on the individual orders.   LIGHT BLUE TOP   GREEN TOP   LAVENDER TOP   GOLD TOP - SST     Xr Chest 2 View    Result Date: 5/24/2018  Narrative: [facility] [place] Patient Name: LÓPEZ LANGSTON ORDERING: HAYDEN BAUER ATTENDING: HAYDEN BAUER REFERRING: HAYDEN BAUER ----------------------- PROCEDURE: Two-view chest COMPARISON: CTA Cardiac dated 12/14/2017. HISTORY: Chest Pain triage protocol FINDINGS: Frontal and lateral views of the chest are obtained. Devices: None Lungs/Pleura: There is a small focal area of opacification in the left lung base which is similar compared with the cardiac CTA of 12/14/2017 and likely represents scarring or atelectasis. Lungs otherwise appear clear. Cardiomediastinal structures: Normal     Impression: CONCLUSION:  No acute cardiopulmonary disease Electronically signed by:  Kevin Espinoza MD  5/24/2018 3:10 PM CDT Workstation: [id number]              HEART Score (for prediction of 6-week risk of major adverse cardiac event) reviewed and/or performed as part of the patient evaluation and treatment planning process.  The result associated with this review/performance is: 5       MDM      Final diagnoses:   Chest pain, unspecified type   Chronic kidney disease, unspecified CKD stage            Hayden Bauer MD  05/24/18 [id number]

## 2018-05-24 NOTE — H&P
[facility] Medicine Admission      Date of Admission: 5/24/2018      Primary Care Physician: Lynnette Crawford MD      Chief Complaint: chest discomfort     HPI:This is a 63 y old woman  With hx of HTN on metoprolol who last night after going to  Her grand son graduation when going back to her car she felt some chest discomfort  Then felt nauseated and dizzy almost lost consciousness . She became diaphoretic .  She did not have chest pain . Denied shortness of breath or any other symptoms  Her cardiac enzymes were WNL ,the EKG did not show any abnormality   Her creatinine was very mildly elevated       Past Medical History:  has a past medical history of Dysuria; Fibromyalgia; GERD (gastroesophageal reflux disease); Hyperlipidemia; Hypertension; and Urinary tract infectious disease.    Past Surgical History:  has a past surgical history that includes Knee Arthroplasty; Back surgery; Hysterectomy; and Sinus surgery.    Family History: family history includes Cancer in her father; Heart disease in her father and mother; Hypertension in her father and mother.    Social History:  reports that she has quit smoking. She has never used smokeless tobacco. She reports that she does not drink alcohol or use drugs.    Allergies:   Allergies   Allergen Reactions   • Ace Inhibitors Dizziness   • Gabapentin Swelling   • Gemfibrozil Swelling   • Doxycycline Rash   • Nickel Rash   • Other Rash     Electrode adhesive- burning and rash     • Sulfa Antibiotics Rash       Medications:   Prescriptions Prior to Admission   Medication Sig Dispense Refill Last Dose   • acetaminophen (TYLENOL) 500 MG tablet Take 1,000 mg by mouth 2 (Two) Times a Day.   5/23/2018 at 2100   • ALPRAZolam (XANAX) 0.5 MG tablet Take 0.5 mg by mouth 2 (Two) Times a Day.   5/23/2018 at 2100   • estradiol (ESTRACE) 0.5 MG tablet Take 0.5 mg by mouth Daily.   5/24/2018 at 0900   • fenofibrate 160 MG tablet Take 160 mg by  mouth Daily.   5/23/2018 at 2100   • irbesartan-hydrochlorothiazide (AVALIDE) 150-12.5 MG tablet Take 1 tablet by mouth Daily. (Patient taking differently: Take 0.5 tablets by mouth 2 (Two) Times a Day. Takes 1/2 tab in am and 1/2 tab at noon) 90 tablet 2 5/24/2018 at 0900   • metoprolol succinate XL (TOPROL-XL) 100 MG 24 hr tablet Take 50 mg by mouth every night at bedtime.   5/23/2018 at 2100   • omeprazole (priLOSEC) 20 MG capsule Take 20 mg by mouth Daily.   5/24/2018 at 0900   • traMADol (ULTRAM) 50 MG tablet Take 50 mg by mouth 2 (Two) Times a Day.   5/23/2018 at 2100   • triamcinolone (KENALOG) 0.5 % cream Apply 1 application topically 3 (Three) Times a Day.   5/24/2018 at 0900       Review of Systems:  Review of Systems   Constitutional: Negative for activity change, appetite change, chills, diaphoresis, fatigue, fever and unexpected weight change.   HENT: Negative.  Negative for congestion, dental problem, drooling, ear discharge, ear pain, facial swelling, hearing loss, mouth sores, nosebleeds, postnasal drip, rhinorrhea, sinus pressure, sneezing, tinnitus, trouble swallowing and voice change.    Eyes: Negative for photophobia and discharge.   Respiratory: Negative for apnea, cough, choking, shortness of breath, wheezing and stridor.    Cardiovascular: Negative for chest pain, palpitations and leg swelling.   Gastrointestinal: Negative for abdominal pain, anal bleeding, blood in stool, constipation, diarrhea, nausea, rectal pain and vomiting.   Endocrine: Negative for cold intolerance, heat intolerance, polydipsia, polyphagia and polyuria.   Genitourinary: Negative for dysuria, enuresis, frequency, hematuria and urgency.   Musculoskeletal: Negative for arthralgias, back pain, joint swelling, myalgias, neck pain and neck stiffness.   Skin: Negative for color change, pallor, rash and wound.   Allergic/Immunologic: Negative for food allergies and immunocompromised state.   Neurological: Negative for  dizziness, tremors, seizures, syncope, facial asymmetry, speech difficulty, weakness, light-headedness, numbness and headaches.   Hematological: Negative for adenopathy.   Psychiatric/Behavioral: Negative for agitation, behavioral problems, confusion, hallucinations, sleep disturbance and suicidal ideas. The patient is not nervous/anxious.       Otherwise complete ROS is negative except as mentioned above.    Physical Exam:   Temp:  [96.1 °F (35.6 °C)-97.4 °F (36.3 °C)] 96.1 °F (35.6 °C)  Heart Rate:  [62-67] 64  Resp:  [19-20] 20  BP: (121-150)/(60-81) 121/78  Physical Exam   Constitutional: She is oriented to person, place, and time. She appears well-developed and well-nourished.   Eyes: EOM are normal. Pupils are equal, round, and reactive to light.   Neck: Normal range of motion. Neck supple.   Cardiovascular: Normal rate.    Pulmonary/Chest: Effort normal and breath sounds normal.   Abdominal: Soft. Bowel sounds are normal.   Musculoskeletal: Normal range of motion.   Neurological: She is alert and oriented to person, place, and time.   Skin: Skin is warm and dry.         Results Reviewed:  I have personally reviewed current lab, radiology, and data and agree with results.  Lab Results (last 24 hours)     Procedure Component Value Units Date/Time    Troponin [[id number]]  (Normal) Collected:  05/24/18 1726    Specimen:  Blood from Arm, Left Updated:  05/24/18 1809     Troponin I <0.012 ng/mL     [place] Draw [[id number]] Collected:  05/24/18 1424    Specimen:  Blood Updated:  05/24/18 1530    Narrative:       The following orders were created for panel order [place] Draw.  Procedure                               Abnormality         Status                     ---------                               -----------         ------                     Light Blue Top[[id number]]                                   Final result               Green Top (Gel)[[id number]]                                  Final result                Lavender Top[[id number]]                                     Final result               Gold Top - SST[[id number]]                                   Final result                 Please view results for these tests on the individual orders.    Light Blue Top [[id number]] Collected:  05/24/18 1424    Specimen:  Blood Updated:  05/24/18 1530     Extra Tube hold for add-on     Comment: Auto resulted       Gold Top - SST [[id number]] Collected:  05/24/18 1424    Specimen:  Blood Updated:  05/24/18 1530     Extra Tube Hold for add-ons.     Comment: Auto resulted.       Green Top (Gel) [[id number]] Collected:  05/24/18 1424    Specimen:  Blood Updated:  05/24/18 1530     Extra Tube Hold for add-ons.     Comment: Auto resulted.       Lavender Top [[id number]] Collected:  05/24/18 1424    Specimen:  Blood Updated:  05/24/18 1530     Extra Tube hold for add-on     Comment: Auto resulted       CK-MB [[id number]]  (Normal) Collected:  05/24/18 1424    Specimen:  Blood Updated:  05/24/18 1515     CKMB 1.78 ng/mL     D-dimer, Quantitative [[id number]]  (Normal) Collected:  05/24/18 1424    Specimen:  Blood Updated:  05/24/18 1509     D-Dimer, Quantitative 369 ng/mL (FEU)     Narrative:       Dimer values <500 ng/ml FEU are FDA approved as aid in diagnosis of deep venous thrombosis and pulmonary embolism.  This test should not be used in an exclusion strategy with pretest probability alone.    A recent guideline regarding diagnosis for pulmonary thomboembolism recommends an adjusted exclusion criterion of age x 10 ng/ml FEU for patients >50 years of age ([employer] Intern Med 2015; 163: 701-711).    Protime-INR [[id number]]  (Normal) Collected:  05/24/18 1424    Specimen:  Blood Updated:  05/24/18 1509     Protime 13.2 Seconds      INR 1.02    Narrative:       Therapeutic range for most indications is 2.0-3.0 INR,  or 2.5-3.5 for mechanical heart valves.    aPTT [[id number]]  (Normal) Collected:  05/24/18 1424    Specimen:  Blood Updated:   05/24/18 1508     PTT 28.0 seconds     Narrative:       The recommended Heparin therapeutic range is 68-97 seconds.    Troponin [[id number]]  (Normal) Collected:  05/24/18 1424    Specimen:  Blood Updated:  05/24/18 1505     Troponin I <0.012 ng/mL     BNP [[id number]]  (Normal) Collected:  05/24/18 1424    Specimen:  Blood Updated:  05/24/18 1505     proBNP 138.0 pg/mL     CK [[id number]]  (Normal) Collected:  05/24/18 1424    Specimen:  Blood Updated:  05/24/18 1505     Creatine Kinase 106 U/L     Comprehensive Metabolic Panel [[id number]]  (Abnormal) Collected:  05/24/18 1424    Specimen:  Blood Updated:  05/24/18 1455     Glucose 93 mg/dL      BUN 39 (H) mg/dL      Creatinine 1.23 (H) mg/dL      Sodium 138 mmol/L      Potassium 4.1 mmol/L      Chloride 103 mmol/L      CO2 24.0 mmol/L      Calcium 9.5 mg/dL      Total Protein 7.5 g/dL      Albumin 4.10 g/dL      ALT (SGPT) 36 U/L      AST (SGOT) 35 U/L      Alkaline Phosphatase 69 U/L      Total Bilirubin 0.4 mg/dL      eGFR Non African Amer 44 (L) mL/min/1.73      Globulin 3.4 gm/dL      A/G Ratio 1.2 g/dL      BUN/Creatinine Ratio 31.7 (H)     Anion Gap 11.0 mmol/L     CBC & Differential [[id number]] Collected:  05/24/18 1424    Specimen:  Blood Updated:  05/24/18 1439    Narrative:       The following orders were created for panel order CBC & Differential.  Procedure                               Abnormality         Status                     ---------                               -----------         ------                     CBC Auto Differential[[id number]]        Abnormal            Final result                 Please view results for these tests on the individual orders.    CBC Auto Differential [[id number]]  (Abnormal) Collected:  05/24/18 1424    Specimen:  Blood Updated:  05/24/18 1439     WBC 7.78 10*3/mm3      RBC 4.59 10*6/mm3      Hemoglobin 13.7 g/dL      Hematocrit 40.0 %      MCV 87.1 fL      MCH 29.8 pg      MCHC 34.3 g/dL      RDW 13.3 %      RDW-SD  42.4 fl      MPV 10.5 fL      Platelets 301 10*3/mm3      Neutrophil % 56.7 %      Lymphocyte % 28.4 %      Monocyte % 11.1 %      Eosinophil % 2.1 %      Basophil % 0.8 %      Immature Grans % 0.9 (H) %      Neutrophils, Absolute 4.42 10*3/mm3      Lymphocytes, Absolute 2.21 10*3/mm3      Monocytes, Absolute 0.86 10*3/mm3      Eosinophils, Absolute 0.16 10*3/mm3      Basophils, Absolute 0.06 10*3/mm3      Immature Grans, Absolute 0.07 (H) 10*3/mm3         Imaging Results (last 24 hours)     Procedure Component Value Units Date/Time    XR Chest 2 View [[id number]] Collected:  05/24/18 1454     Updated:  05/24/18 1513    Narrative:         [facility]    Patient Name: LÓPEZ LANGSTON    ORDERING: JIM BAUER     ATTENDING: JIM BAUER     REFERRING: JIM BAUER    -----------------------    PROCEDURE: Two-view chest    COMPARISON: CTA Cardiac dated 12/14/2017.    HISTORY: Chest Pain triage protocol    FINDINGS: Frontal and lateral views of the chest are obtained.     Devices: None    Lungs/Pleura: There is a small focal area of opacification in the  left lung base which is similar compared with the cardiac CTA of  12/14/2017 and likely represents scarring or atelectasis. Lungs  otherwise appear clear.    Cardiomediastinal structures: Normal         Impression:       CONCLUSION:    No acute cardiopulmonary disease    Electronically signed by:  Kevin Espinoza MD  5/24/2018 3:10 PM CDT  Workstation: [id number]            Assessment:    Hospital Problem List     Chest pain      Leo          Plan:observation   Telemetry   Iv fluids   Serial cardiac enzymes   Cardiology consult   Aspirin   Continue home medication as medical course dictates   Hold arb /hctz     I discussed the patients findings and my recommendations with: patient and    Full code patient     Juan J Loja MD  05/24/18  6:58 PM

## 2018-05-25 PROBLEM — N17.9 AKI (ACUTE KIDNEY INJURY): Status: ACTIVE | Noted: 2018-05-25

## 2018-05-25 LAB
ANION GAP SERPL CALCULATED.3IONS-SCNC: 6 MMOL/L (ref 5–15)
BUN BLD-MCNC: 32 MG/DL (ref 7–21)
BUN/CREAT SERPL: 28.8 (ref 7–25)
CALCIUM SPEC-SCNC: 8.6 MG/DL (ref 8.4–10.2)
CHLORIDE SERPL-SCNC: 105 MMOL/L (ref 95–110)
CO2 SERPL-SCNC: 27 MMOL/L (ref 22–31)
CREAT BLD-MCNC: 1.11 MG/DL (ref 0.5–1)
DEPRECATED RDW RBC AUTO: 41.9 FL (ref 36.4–46.3)
ERYTHROCYTE [DISTWIDTH] IN BLOOD BY AUTOMATED COUNT: 13.1 % (ref 11.5–14.5)
GFR SERPL CREATININE-BSD FRML MDRD: 49 ML/MIN/1.73 (ref 45–104)
GLUCOSE BLD-MCNC: 87 MG/DL (ref 60–100)
HCT VFR BLD AUTO: 36.3 % (ref 35–45)
HGB BLD-MCNC: 12.3 G/DL (ref 12–15.5)
MCH RBC QN AUTO: 29.5 PG (ref 26.5–34)
MCHC RBC AUTO-ENTMCNC: 33.9 G/DL (ref 31.4–36)
MCV RBC AUTO: 87.1 FL (ref 80–98)
PLATELET # BLD AUTO: 227 10*3/MM3 (ref 150–450)
PMV BLD AUTO: 10.2 FL (ref 8–12)
POTASSIUM BLD-SCNC: 4.2 MMOL/L (ref 3.5–5.1)
RBC # BLD AUTO: 4.17 10*6/MM3 (ref 3.77–5.16)
SODIUM BLD-SCNC: 138 MMOL/L (ref 137–145)
WBC NRBC COR # BLD: 5.64 10*3/MM3 (ref 3.2–9.8)

## 2018-05-25 PROCEDURE — 85027 COMPLETE CBC AUTOMATED: CPT | Performed by: INTERNAL MEDICINE

## 2018-05-25 PROCEDURE — 25010000002 ENOXAPARIN PER 10 MG: Performed by: INTERNAL MEDICINE

## 2018-05-25 PROCEDURE — 80048 BASIC METABOLIC PNL TOTAL CA: CPT | Performed by: INTERNAL MEDICINE

## 2018-05-25 PROCEDURE — 96372 THER/PROPH/DIAG INJ SC/IM: CPT

## 2018-05-25 PROCEDURE — G0378 HOSPITAL OBSERVATION PER HR: HCPCS

## 2018-05-25 RX ADMIN — FENOFIBRATE 145 MG: 145 TABLET ORAL at 20:16

## 2018-05-25 RX ADMIN — ALPRAZOLAM 0.5 MG: 0.5 TABLET ORAL at 08:51

## 2018-05-25 RX ADMIN — TRAMADOL HYDROCHLORIDE 50 MG: 50 TABLET, FILM COATED ORAL at 20:16

## 2018-05-25 RX ADMIN — BETAMETHASONE DIPROPIONATE: 0.5 CREAM TOPICAL at 08:52

## 2018-05-25 RX ADMIN — ALPRAZOLAM 0.5 MG: 0.5 TABLET ORAL at 20:16

## 2018-05-25 RX ADMIN — PANTOPRAZOLE SODIUM 40 MG: 40 TABLET, DELAYED RELEASE ORAL at 06:23

## 2018-05-25 RX ADMIN — TRAMADOL HYDROCHLORIDE 50 MG: 50 TABLET, FILM COATED ORAL at 08:51

## 2018-05-25 RX ADMIN — ENOXAPARIN SODIUM 30 MG: 30 INJECTION SUBCUTANEOUS at 20:16

## 2018-05-25 RX ADMIN — SODIUM CHLORIDE 125 ML/HR: 900 INJECTION, SOLUTION INTRAVENOUS at 10:34

## 2018-05-25 RX ADMIN — METOPROLOL SUCCINATE 50 MG: 50 TABLET, EXTENDED RELEASE ORAL at 20:16

## 2018-05-25 RX ADMIN — ESTRADIOL 0.5 MG: 0.5 TABLET ORAL at 08:51

## 2018-05-25 NOTE — PROGRESS NOTES
[facility] Medicine Services  INPATIENT PROGRESS NOTE    Length of Stay: 0  Date of Admission: 5/24/2018  Primary Care Physician: Lynnette Crawford MD    Subjective   Chief Complaint: Chest pain  HPI:  Patient was admitted to the hospital with chest pain troponin came back negative, CT angiogram, came back normal, due in September also patient had acute kidney injury for which patient was given IV fluid, hydrochlorothiazide and are present on hold, patient condition is improving, we will decrease IV fluid, follow-up final cardiology recommendation    Review of Systems   Constitutional: Negative for activity change, chills and fever.   HENT: Negative for congestion, ear discharge, ear pain, nosebleeds, postnasal drip, sinus pain, sneezing, sore throat and tinnitus.    Eyes: Negative for pain, discharge, redness and itching.   Respiratory: Negative for apnea, cough, choking and chest tightness.    Cardiovascular: Negative for chest pain and leg swelling.   Gastrointestinal: Negative for abdominal distention, blood in stool, constipation and vomiting.   Genitourinary: Negative for enuresis, flank pain and urgency.   Musculoskeletal: Negative for neck pain.   Skin: Negative for rash.   Neurological: Negative for dizziness, tremors, seizures, syncope, light-headedness and numbness.   Psychiatric/Behavioral: Negative for agitation, confusion, decreased concentration and hallucinations.        All pertinent negatives and positives are as above. All other systems have been reviewed and are negative unless otherwise stated.     Objective    Temp:  [96.1 °F (35.6 °C)-98 °F (36.7 °C)] 98 °F (36.7 °C)  Heart Rate:  [62-70] 64  Resp:  [18-20] 18  BP: (102-150)/(54-81) 108/60    Physical Exam   Constitutional: She is oriented to person, place, and time. She appears well-developed and well-nourished.   HENT:   Head: Normocephalic and atraumatic.   Right Ear: External ear normal.   Eyes:  Conjunctivae and EOM are normal. Pupils are equal, round, and reactive to light.   Neck: Normal range of motion. Neck supple. No JVD present.   Cardiovascular: Normal rate, regular rhythm and normal heart sounds.  Exam reveals no gallop.    Pulmonary/Chest: Effort normal and breath sounds normal. No stridor. She has no wheezes. She has no rales.   Abdominal: Soft. Bowel sounds are normal. There is no tenderness.   Musculoskeletal: Normal range of motion.   Neurological: She is alert and oriented to person, place, and time. She displays normal reflexes.   Skin: Skin is warm and dry. No rash noted.   Psychiatric: She has a normal mood and affect. Her behavior is normal. Judgment and thought content normal.   Vitals reviewed.          Results Review:  I have reviewed the labs, radiology results, and diagnostic studies.    Laboratory Data:     Results from last 7 days  Lab Units 05/25/18  0632 05/24/18  1424   SODIUM mmol/L 138 138   POTASSIUM mmol/L 4.2 4.1   CHLORIDE mmol/L 105 103   CO2 mmol/L 27.0 24.0   BUN mg/dL 32* 39*   CREATININE mg/dL 1.11* 1.23*   GLUCOSE mg/dL 87 93   CALCIUM mg/dL 8.6 9.5   BILIRUBIN mg/dL  --  0.4   ALK PHOS U/L  --  69   ALT (SGPT) U/L  --  36   AST (SGOT) U/L  --  35   ANION GAP mmol/L 6.0 11.0     Estimated Creatinine Clearance: 62.7 mL/min (A) (by C-G formula based on SCr of 1.11 mg/dL (H)).            Results from last 7 days  Lab Units 05/25/18  0632 05/24/18  1424   WBC 10*3/mm3 5.64 7.78   HEMOGLOBIN g/dL 12.3 13.7   HEMATOCRIT % 36.3 40.0   PLATELETS 10*3/mm3 227 301       Results from last 7 days  Lab Units 05/24/18  1424   INR  1.02       Culture Data:   No results found for: BLOODCX  No results found for: URINECX  No results found for: RESPCX  No results found for: WOUNDCX  No results found for: STOOLCX  No components found for: BODYFLD    Radiology Data:   Imaging Results (last 24 hours)     Procedure Component Value Units Date/Time    XR Chest 2 View [[id number]] Collected:   05/24/18 1454     Updated:  05/24/18 1513    Narrative:         [facility]    Patient Name: LÓPEZ LANGSTON    ORDERING: JIM BAUER     ATTENDING: JIM BAUER     REFERRING: JIM BAUER    -----------------------    PROCEDURE: Two-view chest    COMPARISON: CTA Cardiac dated 12/14/2017.    HISTORY: Chest Pain triage protocol    FINDINGS: Frontal and lateral views of the chest are obtained.     Devices: None    Lungs/Pleura: There is a small focal area of opacification in the  left lung base which is similar compared with the cardiac CTA of  12/14/2017 and likely represents scarring or atelectasis. Lungs  otherwise appear clear.    Cardiomediastinal structures: Normal         Impression:       CONCLUSION:    No acute cardiopulmonary disease    Electronically signed by:  Kevin Espinoza MD  5/24/2018 3:10 PM CDT  Workstation: [id number]          Scheduled Meds:  ALPRAZolam 0.5 mg Oral BID   betamethasone (augmented)  Topical Q24H   enoxaparin 30 mg Subcutaneous Q24H   estradiol 0.5 mg Oral Daily   fenofibrate 145 mg Oral Nightly   metoprolol succinate XL 50 mg Oral Q24H   pantoprazole 40 mg Oral QAM   traMADol 50 mg Oral BID     Continuous Infusions:  sodium chloride 125 mL/hr Last Rate: 125 mL/hr (05/25/18 1034)     PRN Meds:.nitroglycerin  •  sodium chloride  •  sodium chloride    Assessment/Plan     Active Problems:    Chest pain    YIMI (acute kidney injury)        Plan:    1.  Chest pain  Troponin is negative  CT angiogram, is normal  Follow-up follow cardiology recommendation regarding hydrochlorothiazide.    2. acute kidney injury  Patient creatinine is improving 1.11 today  Continue to hold hydrochlorothiazide.  Decrease IV fluid  Basic metabolic panel in a.m.  Adjust all medication to kidney function  No nephrotoxicity  No contrast material              Discharge Planning: I expect patient to be discharged to home in one  days.    Debbi Gannon MD  05/25/18  10:52 AM

## 2018-05-25 NOTE — PLAN OF CARE
Problem: Patient Care Overview  Goal: Plan of Care Review  Outcome: Ongoing (interventions implemented as appropriate)   05/25/18 0542   Coping/Psychosocial   Plan of Care Reviewed With patient   Plan of Care Review   Progress improving   OTHER   Outcome Summary new admit. no c/o chest pain.     Goal: Individualization and Mutuality  Outcome: Ongoing (interventions implemented as appropriate)    Goal: Discharge Needs Assessment  Outcome: Ongoing (interventions implemented as appropriate)      Problem: Cardiac: ACS (Acute Coronary Syndrome) (Adult)  Goal: Signs and Symptoms of Listed Potential Problems Will be Absent, Minimized or Managed (Cardiac: ACS)  Outcome: Ongoing (interventions implemented as appropriate)

## 2018-05-25 NOTE — PLAN OF CARE
Problem: Cardiac: ACS (Acute Coronary Syndrome) (Adult)  Goal: Signs and Symptoms of Listed Potential Problems Will be Absent, Minimized or Managed (Cardiac: ACS)  Outcome: Ongoing (interventions implemented as appropriate)      Problem: Patient Care Overview  Goal: Plan of Care Review  Outcome: Ongoing (interventions implemented as appropriate)    Goal: Individualization and Mutuality  Outcome: Ongoing (interventions implemented as appropriate)

## 2018-05-26 VITALS
HEART RATE: 73 BPM | OXYGEN SATURATION: 94 % | TEMPERATURE: 97.6 F | RESPIRATION RATE: 18 BRPM | SYSTOLIC BLOOD PRESSURE: 150 MMHG | WEIGHT: 231.25 LBS | HEIGHT: 67 IN | DIASTOLIC BLOOD PRESSURE: 72 MMHG | BODY MASS INDEX: 36.29 KG/M2

## 2018-05-26 LAB
ANION GAP SERPL CALCULATED.3IONS-SCNC: 7 MMOL/L (ref 5–15)
BUN BLD-MCNC: 25 MG/DL (ref 7–21)
BUN/CREAT SERPL: 23.8 (ref 7–25)
CALCIUM SPEC-SCNC: 8.8 MG/DL (ref 8.4–10.2)
CHLORIDE SERPL-SCNC: 105 MMOL/L (ref 95–110)
CO2 SERPL-SCNC: 27 MMOL/L (ref 22–31)
CREAT BLD-MCNC: 1.05 MG/DL (ref 0.5–1)
GFR SERPL CREATININE-BSD FRML MDRD: 53 ML/MIN/1.73 (ref 45–104)
GLUCOSE BLD-MCNC: 85 MG/DL (ref 60–100)
POTASSIUM BLD-SCNC: 4.6 MMOL/L (ref 3.5–5.1)
SODIUM BLD-SCNC: 139 MMOL/L (ref 137–145)
WHOLE BLOOD HOLD SPECIMEN: NORMAL

## 2018-05-26 PROCEDURE — 80048 BASIC METABOLIC PNL TOTAL CA: CPT | Performed by: INTERNAL MEDICINE

## 2018-05-26 PROCEDURE — 99214 OFFICE O/P EST MOD 30 MIN: CPT | Performed by: INTERNAL MEDICINE

## 2018-05-26 PROCEDURE — G0378 HOSPITAL OBSERVATION PER HR: HCPCS

## 2018-05-26 RX ADMIN — PANTOPRAZOLE SODIUM 40 MG: 40 TABLET, DELAYED RELEASE ORAL at 06:01

## 2018-05-26 RX ADMIN — BETAMETHASONE DIPROPIONATE: 0.5 CREAM TOPICAL at 08:41

## 2018-05-26 RX ADMIN — TRAMADOL HYDROCHLORIDE 50 MG: 50 TABLET, FILM COATED ORAL at 08:41

## 2018-05-26 RX ADMIN — ESTRADIOL 0.5 MG: 0.5 TABLET ORAL at 08:41

## 2018-05-26 RX ADMIN — ALPRAZOLAM 0.5 MG: 0.5 TABLET ORAL at 08:41

## 2018-05-26 RX ADMIN — SODIUM CHLORIDE 50 ML/HR: 900 INJECTION, SOLUTION INTRAVENOUS at 02:12

## 2018-05-26 NOTE — NURSING NOTE
Pt states that she will call Dr. Clemons office to schedule her post tilt table appointment. Pt verbalized understanding of importance to follow up with Dr. Teresa.

## 2018-05-26 NOTE — PLAN OF CARE
Problem: Patient Care Overview  Goal: Plan of Care Review  Outcome: Ongoing (interventions implemented as appropriate)    Goal: Individualization and Mutuality  Outcome: Ongoing (interventions implemented as appropriate)    Goal: Discharge Needs Assessment  Outcome: Ongoing (interventions implemented as appropriate)    Goal: Interprofessional Rounds/Family Conf  Outcome: Ongoing (interventions implemented as appropriate)      Problem: Cardiac: ACS (Acute Coronary Syndrome) (Adult)  Goal: Signs and Symptoms of Listed Potential Problems Will be Absent, Minimized or Managed (Cardiac: ACS)  Outcome: Ongoing (interventions implemented as appropriate)      Problem: Patient Care Overview  Goal: Plan of Care Review  Outcome: Ongoing (interventions implemented as appropriate)    Goal: Individualization and Mutuality  Outcome: Ongoing (interventions implemented as appropriate)    Goal: Discharge Needs Assessment  Outcome: Ongoing (interventions implemented as appropriate)    Goal: Interprofessional Rounds/Family Conf  Outcome: Ongoing (interventions implemented as appropriate)

## 2018-05-26 NOTE — CONSULTS
CARDIOLOGY CONSULTATION NOTE    Referring Provider: Jimob Anthony MD/  Hospitalist Service    Reason for Consultation: Evaluation of chest pain / Palpitation    Maude Joyner  1955  63 y.o. female      HPI    Mrs. Joynre  is a 63-year-old  lady who was admitted for evaluation of chest pressure/palpitation/dizziness.  She was apparently at a graduation function in [place] and while walking on her way back to her car, she experienced palpitation, chest pressure almost passed out.  Following admission to the hospital, she has ruled out for myocardial infarction and no cardiac arrhythmias have been noted thus far.  He has had episodes similar to this when she felt like she might pass out.  There have been no juan ramon syncopal episode.  She has been evaluated for labile hypertension, and chest pain in the past and CT and grandmother coronary arteries were performed in December 2017 which showed:   Normal CT coronary angiogram. No evidence of any  significant plaque or narrowing in the coronary arteries.  Calcium score 3, low risk for coronary disease. Normal functional  analysis. Computer-assisted calculation of left ventricular  ejection fraction 77%.    He does have risk factors for coronary artery disease including hypertension, hyperlipidemia and carries a diagnosis of fibromyalgia. Echocardiogram  in December 2017 showed normal left ventricular systolic function with no significant valvular abnormalities.     SUBJECTIVE    Past Medical History:   Diagnosis Date   • Dysuria    • Fibromyalgia    • GERD (gastroesophageal reflux disease)    • Hyperlipidemia    • Hypertension    • Urinary tract infectious disease          Past Surgical History:   Procedure Laterality Date   • BACK SURGERY     • HYSTERECTOMY     • KNEE ARTHROPLASTY     • SINUS SURGERY           Family History   Problem Relation Age of Onset   • Heart disease Mother    • Hypertension Mother    • Cancer Father    • Heart disease Father    •  "Hypertension Father          Social History     Social History   • Marital status:      Spouse name: N/A   • Number of children: N/A   • Years of education: N/A     Occupational History   • Not on file.     Social History Main Topics   • Smoking status: Former Smoker   • Smokeless tobacco: Never Used   • Alcohol use No   • Drug use: No   • Sexual activity: Defer     Other Topics Concern   • Not on file     Social History Narrative   • No narrative on file         Allergies   Allergen Reactions   • Ace Inhibitors Dizziness   • Gabapentin Swelling   • Gemfibrozil Swelling   • Doxycycline Rash   • Nickel Rash   • Other Rash     Electrode adhesive- burning and rash     • Sulfa Antibiotics Rash         No current facility-administered medications for this encounter.      Current Outpatient Prescriptions   Medication Sig Dispense Refill   • acetaminophen (TYLENOL) 500 MG tablet Take 1,000 mg by mouth 2 (Two) Times a Day.     • ALPRAZolam (XANAX) 0.5 MG tablet Take 0.5 mg by mouth 2 (Two) Times a Day.     • estradiol (ESTRACE) 0.5 MG tablet Take 0.5 mg by mouth Daily.     • fenofibrate 160 MG tablet Take 160 mg by mouth Daily.     • metoprolol succinate XL (TOPROL-XL) 100 MG 24 hr tablet Take 50 mg by mouth every night at bedtime.     • omeprazole (priLOSEC) 20 MG capsule Take 20 mg by mouth Daily.     • traMADol (ULTRAM) 50 MG tablet Take 50 mg by mouth 2 (Two) Times a Day.     • triamcinolone (KENALOG) 0.5 % cream Apply 1 application topically 3 (Three) Times a Day.           OBJECTIVE    /72 (BP Location: Left arm, Patient Position: Lying)   Pulse 73   Temp 97.6 °F (36.4 °C) (Tympanic)   Resp 18   Ht 170.2 cm (67.01\")   Wt 105 kg (231 lb 4 oz)   LMP 05/01/2004 (Approximate)   SpO2 94%   BMI 36.21 kg/m²       Review of Systems     Constitutional:  Denies recent weight loss, weight gain, fever or chills     HENT:  Denies any hearing loss, epistaxis, hoarseness, or difficulty speaking.     Eyes: Wears " eyeglasses or contact lenses     Respiratory:  Denies dyspnea with exertion,no cough, wheezing, or hemoptysis.     Cardiovascular: See HPI    Gastrointestinal:  Denies change in bowel habits, dyspepsia, ulcer disease, hematochezia, or melena.     Endocrine: Negative for cold intolerance, heat intolerance, polydipsia, polyphagia and polyuria.     Genitourinary: Negative.      Musculoskeletal: DJD, Fibromyalgia    Skin:  Denies any change in hair or nails, rashes, or skin lesions.     Allergic/Immunologic: Negative.  Negative for environmental allergies, food allergies and immunocompromised state.     Neurological:  Denies any history of recurrent headaches, strokes, TIA, or seizure disorder.     Hematological: Denies any food allergies, seasonal allergies, bleeding disorders, or lymphadenopathy.     Psychiatric/Behavioral: Anxiety    Physical Exam     Constitutional: Cooperative, alert and oriented, well-developed, in no acute distress.     HENT:   Head: Normocephalic, normal hair patterns, no masses or tenderness.  Ears, Nose, and Throat: No gross abnormalities. No pallor or cyanosis.   Eyes: EOMS intact, PERRL, conjunctivae and lids unremarkable. Fundoscopic exam and visual fields not performed.   Neck: No palpable masses or adenopathy, no thyromegaly, no JVD, carotid pulses are full and equal bilaterally and without  Bruits.     Cardiovascular: Regular rhythm, S1 and S2 normal, no S3 or S4.  No murmurs, gallops, or rubs detected.     Pulmonary/Chest: Chest: normal symmetry,normal respiratory excursion, no intercostal retraction, no use of accessory muscles.            Pulmonary: Normal breath sounds. No rales or ronchi.    Abdominal: Abdomen soft, bowel sounds normoactive, no masses, no hepatosplenomegaly, non-tender, no bruits.     Musculoskeletal: No deformities, clubbing, cyanosis, erythema, or edema observed.     Neurological: No gross motor or sensory deficits noted, Cranial nerves 2-12 normal. affect  appropriate, oriented to time, person, place.     Skin: Warm and dry to the touch, no apparent skin lesions or masses noted.     Psychiatric: Normal mood and affect. Behavior is normal. Judgment and thought content normal.     RESULTS  Lab Results (last 24 hours)     Procedure Component Value Units Date/Time    Extra Tubes [[id number]] Collected:  05/26/18 0632    Specimen:  Blood from Blood, Venous Line Updated:  05/26/18 0745    Narrative:       The following orders were created for panel order Extra Tubes.  Procedure                               Abnormality         Status                     ---------                               -----------         ------                     Lavender Top[[id number]]                                     Final result                 Please view results for these tests on the individual orders.    Lavender Top [[id number]] Collected:  05/26/18 0632    Specimen:  Blood Updated:  05/26/18 0745     Extra Tube hold for add-on     Comment: Auto resulted       Basic Metabolic Panel [[id number]]  (Abnormal) Collected:  05/26/18 0632    Specimen:  Blood Updated:  05/26/18 0712     Glucose 85 mg/dL      BUN 25 (H) mg/dL      Creatinine 1.05 (H) mg/dL      Sodium 139 mmol/L      Potassium 4.6 mmol/L      Chloride 105 mmol/L      CO2 27.0 mmol/L      Calcium 8.8 mg/dL      eGFR Non African Amer 53 mL/min/1.73      BUN/Creatinine Ratio 23.8     Anion Gap 7.0 mmol/L               ASSESSMENT AND PLAN    Mrs. Joyner 63-year-old lady who is a regular patient of Dr. Boothe who was presented with palpitation, chest pressure/dizziness/presyncope in the background of fibromyalgia.  Cardiac enzymes have been negative for myocardial injury.  Suspicion for ongoing ischemia is low.  CT angiogram of the coronary arteries were within normal limits in December 2017.  A component of vasovagal/neurocardiogenic event cannot be ruled out and I believe that she will benefit from a tilt able testing.  This has  been arranged for next week, after which she could follow-up with Dr. Boothe as an outpatient.  No paroxysmal arrhythmia cannot be ruled out, it seems unlikely.  A Holter monitor could be considered in the future if symptoms are recurrent.  Thank you for asking me to see this patient.    Jerica Teresa MD  5/26/2018  3:34 PM

## 2018-05-30 ENCOUNTER — DOCUMENTATION (OUTPATIENT)
Dept: NUCLEAR MEDICINE | Facility: HOSPITAL | Age: 63
End: 2018-05-30

## 2018-06-04 ENCOUNTER — HOSPITAL ENCOUNTER (OUTPATIENT)
Dept: CARDIOLOGY | Facility: HOSPITAL | Age: 63
Discharge: HOME | End: 2018-06-04
Attending: INTERNAL MEDICINE | Admitting: INTERNAL MEDICINE

## 2018-06-04 DIAGNOSIS — R55 POSTURAL DIZZINESS WITH PRESYNCOPE: ICD-10-CM

## 2018-06-04 DIAGNOSIS — R42 POSTURAL DIZZINESS WITH PRESYNCOPE: ICD-10-CM

## 2018-06-04 DIAGNOSIS — R42 DIZZINESS: ICD-10-CM

## 2018-06-04 LAB
MAXIMAL PREDICTED HEART RATE: 155 BPM
STRESS TARGET HR: 132 BPM

## 2018-06-04 PROCEDURE — 93660 TILT TABLE EVALUATION: CPT

## 2018-06-04 PROCEDURE — 93660 TILT TABLE EVALUATION: CPT | Performed by: INTERNAL MEDICINE

## 2018-07-09 ENCOUNTER — OFFICE VISIT (OUTPATIENT)
Dept: CARDIOLOGY | Facility: CLINIC | Age: 63
End: 2018-07-09

## 2018-07-09 VITALS
BODY MASS INDEX: 36.26 KG/M2 | HEART RATE: 60 BPM | HEIGHT: 67 IN | WEIGHT: 231 LBS | DIASTOLIC BLOOD PRESSURE: 72 MMHG | SYSTOLIC BLOOD PRESSURE: 137 MMHG | OXYGEN SATURATION: 98 %

## 2018-07-09 DIAGNOSIS — R00.2 PALPITATIONS: ICD-10-CM

## 2018-07-09 DIAGNOSIS — E78.1 PURE HYPERGLYCERIDEMIA: ICD-10-CM

## 2018-07-09 DIAGNOSIS — I10 ESSENTIAL HYPERTENSION: Primary | ICD-10-CM

## 2018-07-09 DIAGNOSIS — R06.02 SOB (SHORTNESS OF BREATH): ICD-10-CM

## 2018-07-09 PROCEDURE — 99214 OFFICE O/P EST MOD 30 MIN: CPT | Performed by: INTERNAL MEDICINE

## 2018-07-09 RX ORDER — IRBESARTAN 150 MG/1
150 TABLET ORAL DAILY
Qty: 90 TABLET | Refills: 5 | Status: SHIPPED | OUTPATIENT
Start: 2018-07-09 | End: 2019-01-11

## 2018-07-09 RX ORDER — METOPROLOL SUCCINATE 100 MG/1
100 TABLET, EXTENDED RELEASE ORAL
Qty: 90 TABLET | Refills: 6 | Status: SHIPPED | OUTPATIENT
Start: 2018-07-09 | End: 2019-01-11

## 2018-07-09 NOTE — PROGRESS NOTES
[facility]  OFFICE NOTE    Maude Joyner  63 y.o. female    07/09/2018  1. Essential hypertension    2. SOB (shortness of breath)    3. Palpitations    4. Pure hyperglyceridemia        Chief complaint -Palpitations with dizziness      History of present Illness- 63-year-old lady who has been disabled after she got Lyme disease and   Her dad had CAD at the age of 50.  She did not smoke or use any form of tobacco.  She had an episode where she was dizzy and felt palpitations and was found to be dehydrated and with the renal function being abnormal and was taken off of the diuretic.  She takes Toprol- mg in the evening and Avapro 150 mill grams in the morning for blood pressure.  She has been getting palpitations frequently once or twice a week and she feels lightheaded with it.  I'm going to do an event monitor as it doesn't happen every day.        Allergies   Allergen Reactions   • Ace Inhibitors Dizziness   • Gabapentin Swelling   • Gemfibrozil Swelling   • Doxycycline Rash   • Nickel Rash   • Other Rash     Electrode adhesive- burning and rash     • Sulfa Antibiotics Rash         Past Medical History:   Diagnosis Date   • Dysuria    • Fibromyalgia    • GERD (gastroesophageal reflux disease)    • Hyperlipidemia    • Hypertension    • Urinary tract infectious disease          Past Surgical History:   Procedure Laterality Date   • BACK SURGERY     • HYSTERECTOMY     • KNEE ARTHROPLASTY     • SINUS SURGERY           Family History   Problem Relation Age of Onset   • Heart disease Mother    • Hypertension Mother    • Cancer Father    • Heart disease Father    • Hypertension Father          Social History     Social History   • Marital status:      Spouse name: N/A   • Number of children: N/A   • Years of education: N/A     Occupational History   • Not on file.     Social History Main Topics   • Smoking status: Former Smoker   • Smokeless tobacco: Never Used   • Alcohol use  "No   • Drug use: No   • Sexual activity: Defer     Other Topics Concern   • Not on file     Social History Narrative   • No narrative on file         Current Outpatient Prescriptions   Medication Sig Dispense Refill   • acetaminophen (TYLENOL) 500 MG tablet Take 1,000 mg by mouth 2 (Two) Times a Day.     • estradiol (ESTRACE) 0.5 MG tablet Take 0.5 mg by mouth Daily.     • fenofibrate 160 MG tablet Take 160 mg by mouth Daily.     • metoprolol succinate XL (TOPROL-XL) 100 MG 24 hr tablet Take 1 tablet by mouth every night at bedtime. 90 tablet 6   • omeprazole (priLOSEC) 20 MG capsule Take 20 mg by mouth Daily.     • traMADol (ULTRAM) 50 MG tablet Take 50 mg by mouth 2 (Two) Times a Day.     • triamcinolone (KENALOG) 0.5 % cream Apply 1 application topically 3 (Three) Times a Day.     • irbesartan (AVAPRO) 150 MG tablet Take 1 tablet by mouth Daily. 90 tablet 5     No current facility-administered medications for this visit.          Review of Systems     Constitution: Generalized fatigue and tiredness    HENT: Denies any headache, hearing impairment,     Eyes: Denies any blurring of vision, or photophobia     Cardivascular - Palpitations once or twice a week    Respiratory system-shortness of breath NYHA class IIb        Endocrine: Hypertriglyceridemia       Musculoskeletal:   history of arthritis with musculoskeletal problems    Gastrointestinal: No nausea, vomiting, or melena    Genitourinary: No dysuria or hematuria    Neurological:   No history of seizure disorder, stroke, memory problems    Psychiatric/Behavioral:         history of depression    Hematological- no history of easy bruising or any bleeding diathesis            OBJECTIVE    /72 (BP Location: Left arm, Patient Position: Sitting)   Pulse 60   Ht 170.2 cm (67.01\")   Wt 105 kg (231 lb)   LMP 05/01/2004 (Approximate)   SpO2 98%   BMI 36.17 kg/m²       Physical Exam     Constitutional: is oriented to person, place, and time.     Skin-warm " and dry    Well developed and nourished     Head: Normocephalic and atraumatic.     Eyes: Pupils are equal    Neck: Neck supple. No bruit in the carotids,    Cardiovascular: [place] in the fifth intercostal space   Regular rate, and  Rhythm,    S1 greater than S2, no S3 or S4, no gallop     Pulmonary/Chest:   Air  Entry is equal on both sides  No wheezing or crackles,      Abdominal: Soft.  No hepatosplenomegaly, bowel sounds are present    Musculoskeletal: No kyphoscoliosis, no significant thickening of the joints    Neurological: is alert and oriented to person, place, and time.    cranial nerve are intact .   No motor or sensory deficit    Extremities-no edema, no radial femoral delay      Psychiatric: He has a normal mood and affect.                  His behavior is normal.           Procedures                 A/P    Palpitations with dizziness will do a event monitor as it doesn't happen every day, if it doesn't It Then May Need to Consider Loop Recorder.    Chest pain and shortness of breath had a CT coronary angiogram in December 2017 which was completely normal with normal ejection fraction.    Hypertension-controlled with the Avapro 150 mg a morning and Toprol 100 mg in the evening    Hypertriglyceridemia on fenofibrate and her triglycerides are okay.  Her LDL is 149.    Tinnitus has an appointment to see Dr. Zaman in the ENT.    Follow-up in 6 months or earlier if the event monitor is abnormal            This document has been electronically signed by Robbie Boothe MD on July 9, 2018 10:42 AM       EMR Dragon/Transcription disclaimer:   Some of this note may be an electronic transcription/translation of spoken language to printed text. The electronic translation of spoken language may permit erroneous, or at times, nonsensical words or phrases to be inadvertently transcribed; Although I have reviewed the note for such errors, some may still exist.

## 2018-07-10 ENCOUNTER — TELEPHONE (OUTPATIENT)
Dept: CARDIOLOGY | Facility: CLINIC | Age: 63
End: 2018-07-10

## 2018-08-03 ENCOUNTER — DOCUMENTATION (OUTPATIENT)
Dept: CARDIOLOGY | Facility: CLINIC | Age: 63
End: 2018-08-03

## 2018-08-16 ENCOUNTER — OFFICE VISIT (OUTPATIENT)
Dept: OTOLARYNGOLOGY | Facility: CLINIC | Age: 63
End: 2018-08-16

## 2018-08-16 ENCOUNTER — CLINICAL SUPPORT (OUTPATIENT)
Dept: AUDIOLOGY | Facility: CLINIC | Age: 63
End: 2018-08-16

## 2018-08-16 VITALS — HEIGHT: 67 IN | WEIGHT: 216 LBS | TEMPERATURE: 98.5 F | BODY MASS INDEX: 33.9 KG/M2

## 2018-08-16 DIAGNOSIS — H90.3 SENSORINEURAL HEARING LOSS (SNHL) OF BOTH EARS: Primary | ICD-10-CM

## 2018-08-16 DIAGNOSIS — H93.12 TINNITUS OF LEFT EAR: ICD-10-CM

## 2018-08-16 DIAGNOSIS — H90.3 SENSORINEURAL HEARING LOSS, BILATERAL: Primary | ICD-10-CM

## 2018-08-16 PROCEDURE — 99213 OFFICE O/P EST LOW 20 MIN: CPT | Performed by: OTOLARYNGOLOGY

## 2018-08-16 RX ORDER — TEMAZEPAM 30 MG/1
30 CAPSULE ORAL
Refills: 5 | COMMUNITY
Start: 2018-08-07

## 2018-08-16 RX ORDER — ESCITALOPRAM OXALATE 20 MG/1
20 TABLET ORAL DAILY
Refills: 1 | COMMUNITY
Start: 2018-08-06

## 2018-08-16 NOTE — PROGRESS NOTES
Subjective   Maude Joyner is a 63 y.o. female.   Patient is been recently very stressed by her tinnitus but it's been placed on antidepressant which is been very helpful    History of Present Illness     Not having drainage pain popping or fluid in her ears no noted ear infections tinnitus bothers her she's not want to consider hearing aid    The following portions of the patient's history were reviewed and updated as appropriate: allergies, current medications, past family history, past medical history, past social history, past surgical history and problem list.      Current Outpatient Prescriptions:   •  acetaminophen (TYLENOL) 500 MG tablet, Take 1,000 mg by mouth 2 (Two) Times a Day., Disp: , Rfl:   •  escitalopram (LEXAPRO) 20 MG tablet, Take 20 mg by mouth Daily., Disp: , Rfl: 1  •  estradiol (ESTRACE) 0.5 MG tablet, Take 0.5 mg by mouth 2 (Two) Times a Week., Disp: , Rfl:   •  fenofibrate 160 MG tablet, Take 160 mg by mouth Daily., Disp: , Rfl:   •  irbesartan (AVAPRO) 150 MG tablet, Take 1 tablet by mouth Daily., Disp: 90 tablet, Rfl: 5  •  metoprolol succinate XL (TOPROL-XL) 100 MG 24 hr tablet, Take 1 tablet by mouth every night at bedtime., Disp: 90 tablet, Rfl: 6  •  omeprazole (priLOSEC) 20 MG capsule, Take 20 mg by mouth Daily., Disp: , Rfl:   •  temazepam (RESTORIL) 30 MG capsule, Take 30 mg by mouth every night at bedtime., Disp: , Rfl: 5  •  traMADol (ULTRAM) 50 MG tablet, Take 50 mg by mouth 2 (Two) Times a Day., Disp: , Rfl:   •  triamcinolone (KENALOG) 0.5 % cream, Apply 1 application topically 3 (Three) Times a Day., Disp: , Rfl:     Allergies   Allergen Reactions   • Ace Inhibitors Dizziness   • Gabapentin Swelling   • Gemfibrozil Swelling   • Oxycodone Itching   • Doxycycline Rash   • Morphine Sulfate Rash   • Nickel Rash   • Other Rash     Electrode adhesive- burning and rash     • Sulfa Antibiotics Rash   • Tapentadol Rash     Rash             Review of Systems   Constitutional: Negative  for fever.   HENT: Positive for hearing loss and tinnitus. Negative for ear discharge and ear pain.    Hematological: Negative for adenopathy.   Psychiatric/Behavioral: Positive for dysphoric mood.           Objective   Physical Exam   Constitutional: She is oriented to person, place, and time. She appears well-developed and well-nourished.   HENT:   Head: Normocephalic and atraumatic.   Right Ear: Hearing, tympanic membrane, external ear and ear canal normal.   Left Ear: Hearing, tympanic membrane, external ear and ear canal normal.   Nose: Nose normal. No mucosal edema, rhinorrhea, nasal deformity or septal deviation. No epistaxis. Right sinus exhibits no maxillary sinus tenderness and no frontal sinus tenderness. Left sinus exhibits no maxillary sinus tenderness and no frontal sinus tenderness.   Mouth/Throat: Uvula is midline, oropharynx is clear and moist and mucous membranes are normal. No trismus in the jaw. Normal dentition. No oropharyngeal exudate or posterior oropharyngeal edema.   Eyes: Conjunctivae and EOM are normal.   Neck: Normal range of motion. Neck supple. No JVD present. No tracheal deviation present. No thyromegaly present.   Cardiovascular: Normal rate.    Pulmonary/Chest: Effort normal.   Musculoskeletal: Normal range of motion.   Lymphadenopathy:        Head (right side): No submental, no submandibular, no tonsillar, no preauricular, no posterior auricular and no occipital adenopathy present.        Head (left side): No submental, no submandibular, no tonsillar, no preauricular, no posterior auricular and no occipital adenopathy present.     She has no cervical adenopathy.        Right cervical: No superficial cervical, no deep cervical and no posterior cervical adenopathy present.       Left cervical: No superficial cervical, no deep cervical and no posterior cervical adenopathy present.   Neurological: She is alert and oriented to person, place, and time. No cranial nerve deficit.   Walks  with cane   Skin: Skin is warm.   Psychiatric: She has a normal mood and affect. Her speech is normal and behavior is normal. Thought content normal.   Nursing note and vitals reviewed.      Audiogram confirms her hearing loss slightly worse left than the right was consistent with her tinnitus she has no evidence of eustachian tube dysfunction    Assessment/Plan   Maude was seen today for follow-up.    Diagnoses and all orders for this visit:    Sensorineural hearing loss (SNHL) of both ears    Tinnitus of left ear      I want any more medicine since she started the Lexapro is tolerating her tinnitus better  Discussed medications the risks benefits of mixing medications for tinnitus.  She's can continue Lexapro and consider hearing aid.  Discussed noise cancellation options and other treatments she's had a previous MRI which was normal  All up 1 year cough questions or problems in the meantime

## 2018-08-16 NOTE — PROGRESS NOTES
STANDARD AUDIOMETRIC EVALUATION      Name:  Maude Joyner  :  1955  Age:  63 y.o.  Date of Evaluation:  2018      HISTORY    Reason for visit:  Maude Joyner is seen today for a hearing evaluation at the request of Dr. Chava Zaman.  Patient reports the noise in her ear is getting louder.  She states she has problems hearing which seems worse in her left ear.      EVALUATION    See Audiogram    RESULTS        Otoscopy and Tympanometry 226 Hz :  Right Ear:  Otoscopy:  Clear ear canal          Tympanometry:  Middle ear function within normal limits    Left Ear:   Otoscopy:  Clear ear canal        Tympanometry:  Middle ear function within normal limits    Test technique:  Standard Audiometry     Pure Tone Audiometry:   Patient responded to pure tones at 25-65 dB for 250-8000 Hz in right ear, and at 25-70 dB for 250-8000 Hz in left ear.       Speech Audiometry:        Right Ear:  Speech Reception Threshold (SRT) was obtained at 35 dBHL                 Speech Discrimination scores were 96% in quiet when words were presented at 75 dBHL       Left Ear:  Speech Reception Threshold (SRT) was obtained at 45 dBHL                 Speech Discrimination scores were 84% in quiet when words were presented at 80 dBHL    Reliability:   good    IMPRESSIONS:  1.  Tympanometry results are consistent with Middle ear function within normal limits in both ears.  2.  Pure tone results are consistent with mild to moderately severe sloping sensorineural hearing loss  for both ears.       RECOMMENDATIONS:  Patient is seeing the Ear Nose and Throat physician immediately following this examination.  It was a pleasure seeing Maude Joyner in Audiology today.  We would be happy to do further testing or discuss these test as necessary.          This document has been electronically signed by MS VINI Holder on 2018 10:15 AM       MS VINI Holder  Licensed Audiologist

## 2018-08-16 NOTE — PATIENT INSTRUCTIONS

## 2018-08-17 ENCOUNTER — CLINICAL SUPPORT (OUTPATIENT)
Dept: AUDIOLOGY | Facility: CLINIC | Age: 63
End: 2018-08-17

## 2018-08-17 DIAGNOSIS — Z71.89 ENCOUNTER FOR HEARING AID CONSULTATION: Primary | ICD-10-CM

## 2018-08-17 PROCEDURE — HEARINGNOCHG: Performed by: AUDIOLOGIST

## 2018-08-17 NOTE — PROGRESS NOTES
HEARING AID CONSULT    Name:  Maude Joyner  :  1955  Age:  63 y.o.  Date of Evaluation:  2018      HISTORY    Reason for visit:  Maude Joyner is seen today for a hearing aid consultation at the request of Dr. Chava Zaman.  A previous audiogram completed on 2018 shows a mild sloping to severe sensorineural hearing loss in both ears..  Patient reports that she spends time watching tv. She reports that she and her  go out to eat.  She reports that she enjoys spending time with her grandsons.  She reports difficulty hearing in background noise.    Hearing aid history:  Patient currently does not have hearing aids.      RECOMMENDATIONS:  Amplification options were discussed.  During the Hearing Aid discussion, Ms. Joyner has chosen 2  in the Canal (MICHELLE) hearing aid(s) for both ears.  The hearing aid recommended is from [employer] with the [employer]  digital circuit. The cost of this hearing aid is $1400.00 per aid for a total of $2800.00 .  The patient wished to go home and think about her options.  The patient was instructed to contact me if she wishes to proceed with amplification.          It was a pleasure seeing Maude Joyner in Audiology today.  I look forward to helping Ms. Joyner with her amplification needs.      For Billing and Coding:  Z71.89  Encounter for Hearing Aid Consultation- no charge        This document has been electronically signed by TAYLOR Robb on 2018 12:47 PM

## 2019-01-11 ENCOUNTER — OFFICE VISIT (OUTPATIENT)
Dept: CARDIOLOGY | Facility: CLINIC | Age: 64
End: 2019-01-11

## 2019-01-11 VITALS
WEIGHT: 216 LBS | HEART RATE: 68 BPM | DIASTOLIC BLOOD PRESSURE: 80 MMHG | BODY MASS INDEX: 33.9 KG/M2 | SYSTOLIC BLOOD PRESSURE: 130 MMHG | HEIGHT: 67 IN | OXYGEN SATURATION: 98 %

## 2019-01-11 DIAGNOSIS — E78.2 MIXED HYPERLIPIDEMIA: ICD-10-CM

## 2019-01-11 DIAGNOSIS — I10 ESSENTIAL HYPERTENSION: Primary | ICD-10-CM

## 2019-01-11 DIAGNOSIS — R00.2 PALPITATIONS: ICD-10-CM

## 2019-01-11 PROCEDURE — 99214 OFFICE O/P EST MOD 30 MIN: CPT | Performed by: INTERNAL MEDICINE

## 2019-01-11 RX ORDER — PRAVASTATIN SODIUM 20 MG
20 TABLET ORAL NIGHTLY
COMMUNITY
End: 2022-05-27 | Stop reason: ALTCHOICE

## 2019-01-11 RX ORDER — IRBESARTAN 150 MG/1
150 TABLET ORAL DAILY
Qty: 90 TABLET | Refills: 5 | Status: SHIPPED | OUTPATIENT
Start: 2019-01-11

## 2019-01-11 RX ORDER — METOPROLOL SUCCINATE 100 MG/1
100 TABLET, EXTENDED RELEASE ORAL
Qty: 90 TABLET | Refills: 6 | Status: SHIPPED | OUTPATIENT
Start: 2019-01-11

## 2019-01-11 NOTE — PROGRESS NOTES
[facility] Cardiology  OFFICE NOTE    Maude Joyner  63 y.o. female    01/11/2019  1. Essential hypertension    2. Palpitations    3. Mixed hyperlipidemia        Chief complaint -Palpitations       History of present Illness- 63-year-old lady who has been disabled after she got Lyme disease and   Her dad had CAD at the age of 50.  She did not smoke or use any form of tobacco.    She takes Toprol- mg in the evening and Avapro 150 mg  in the morning for blood pressure.  She has been getting palpitations frequently once or twice a week and she feels lightheaded with it.  Event monitor showed one run of nonsustained VT other than that no other arrhythmias were noted and she is doing well with the Toprol-XL.  She was started on pravastatin and niacin for her hyperlipidemia by Dr. Lynnette Crawford.        Allergies   Allergen Reactions   • Ace Inhibitors Dizziness   • Gabapentin Swelling   • Gemfibrozil Swelling   • Oxycodone Itching   • Doxycycline Rash   • Morphine Sulfate Rash   • Nickel Rash   • Other Rash     Electrode adhesive- burning and rash     • Sulfa Antibiotics Rash   • Tapentadol Rash     Rash         Past Medical History:   Diagnosis Date   • Dysuria    • Fibromyalgia    • GERD (gastroesophageal reflux disease)    • Hyperlipidemia    • Hypertension    • Urinary tract infectious disease          Past Surgical History:   Procedure Laterality Date   • BACK SURGERY     • HYSTERECTOMY     • KNEE ARTHROPLASTY     • SINUS SURGERY           Family History   Problem Relation Age of Onset   • Heart disease Mother    • Hypertension Mother    • Cancer Father    • Heart disease Father    • Hypertension Father          Social History     Socioeconomic History   • Marital status:      Spouse name: Not on file   • Number of children: Not on file   • Years of education: Not on file   • Highest education level: Not on file   Social Needs   • Financial resource strain: Not on file   • Food  insecurity - worry: Not on file   • Food insecurity - inability: Not on file   • Transportation needs - medical: Not on file   • Transportation needs - non-medical: Not on file   Occupational History   • Not on file   Tobacco Use   • Smoking status: Former Smoker   • Smokeless tobacco: Never Used   Substance and Sexual Activity   • Alcohol use: No   • Drug use: No   • Sexual activity: Defer   Other Topics Concern   • Not on file   Social History Narrative   • Not on file         Current Outpatient Medications   Medication Sig Dispense Refill   • acetaminophen (TYLENOL) 500 MG tablet Take 1,000 mg by mouth 2 (Two) Times a Day.     • CBD (cannabidiol) oral oil Take 12 drops by mouth Daily.     • escitalopram (LEXAPRO) 20 MG tablet Take 20 mg by mouth Daily.  1   • estradiol (ESTRACE) 0.5 MG tablet Take 0.5 mg by mouth 2 (Two) Times a Week.     • irbesartan (AVAPRO) 150 MG tablet Take 1 tablet by mouth Daily. 90 tablet 5   • metoprolol succinate XL (TOPROL-XL) 100 MG 24 hr tablet Take 1 tablet by mouth every night at bedtime. 90 tablet 6   • NIACIN PO Take 500 mg by mouth Daily.     • omeprazole (priLOSEC) 20 MG capsule Take 20 mg by mouth Daily.     • pravastatin (PRAVACHOL) 20 MG tablet Take 20 mg by mouth Every Night.     • temazepam (RESTORIL) 30 MG capsule Take 30 mg by mouth every night at bedtime.  5   • traMADol (ULTRAM) 50 MG tablet Take 50 mg by mouth 2 (Two) Times a Day.     • triamcinolone (KENALOG) 0.5 % cream Apply 1 application topically 3 (Three) Times a Day.       No current facility-administered medications for this visit.          Review of Systems     Constitution: Generalized fatigue and tiredness    HENT: Denies any headache, hearing impairment,     Eyes: Denies any blurring of vision, or photophobia     Cardivascular - Palpitations once or twice a week    Respiratory system-shortness of breath NYHA class IIb        Endocrine: Hypertriglyceridemia       Musculoskeletal:   history of arthritis with  "musculoskeletal problems    Gastrointestinal: No nausea, vomiting, or melena    Genitourinary: No dysuria or hematuria    Neurological:   No history of seizure disorder, stroke, memory problems    Psychiatric/Behavioral:         history of depression    Hematological- no history of easy bruising or any bleeding diathesis            OBJECTIVE    /80   Pulse 68   Ht 170.2 cm (67.01\")   Wt 98 kg (216 lb)   LMP 05/01/2004 (Approximate)   SpO2 98%   BMI 33.82 kg/m²       Physical Exam     Constitutional: is oriented to person, place, and time.     Skin-warm and dry    Well developed and nourished     Head: Normocephalic and atraumatic.     Eyes: Pupils are equal    Neck: Neck supple. No bruit in the carotids,    Cardiovascular: [place] in the fifth intercostal space   Regular rate, and  Rhythm,    S1 greater than S2, no S3 or S4, no gallop     Pulmonary/Chest:   Air  Entry is equal on both sides  No wheezing or crackles,      Abdominal: Soft.  No hepatosplenomegaly, bowel sounds are present    Musculoskeletal: No kyphoscoliosis, no significant thickening of the joints    Neurological: is alert and oriented to person, place, and time.    cranial nerve are intact .   No motor or sensory deficit    Extremities-no edema, no radial femoral delay      Psychiatric: He has a normal mood and affect.                  His behavior is normal.           Procedures                 A/P    Palpitations- doing well with Toprol- mg daily.  Her palpitations have decreased and she is doing better    Chest pain and shortness of breath had a CT coronary angiogram in December 2017 which was completely normal with normal ejection fraction.    Hypertension-controlled with the Avapro 150 mg a morning and Toprol 100 mg in the evening    Hypertriglyceridemia -on niacin and pravastatin per Dr. Lynnette Crawford    Follow-up in 8 months            This document has been electronically signed by Robbie Boothe MD on January 11, 2019 " 10:37 AM       EMR Dragon/Transcription disclaimer:   Some of this note may be an electronic transcription/translation of spoken language to printed text. The electronic translation of spoken language may permit erroneous, or at times, nonsensical words or phrases to be inadvertently transcribed; Although I have reviewed the note for such errors, some may still exist.

## 2019-05-30 ENCOUNTER — TRANSCRIBE ORDERS (OUTPATIENT)
Dept: PHYSICAL THERAPY | Facility: HOSPITAL | Age: 64
End: 2019-05-30

## 2019-05-30 DIAGNOSIS — M48.02 CERVICAL STENOSIS OF SPINE: Primary | ICD-10-CM

## 2019-06-06 ENCOUNTER — HOSPITAL ENCOUNTER (OUTPATIENT)
Dept: PHYSICAL THERAPY | Facility: HOSPITAL | Age: 64
Setting detail: THERAPIES SERIES
Discharge: HOME | End: 2019-06-06

## 2019-06-06 DIAGNOSIS — M48.02 CERVICAL SPINAL STENOSIS: Primary | ICD-10-CM

## 2019-06-06 PROCEDURE — 97110 THERAPEUTIC EXERCISES: CPT | Performed by: PHYSICAL THERAPIST

## 2019-06-06 PROCEDURE — 97162 PT EVAL MOD COMPLEX 30 MIN: CPT | Performed by: PHYSICAL THERAPIST

## 2019-06-06 NOTE — THERAPY EVALUATION
Outpatient Physical Therapy Ortho Initial Evaluation  [facility]     Patient Name: Maude Joyner  : 1955  MRN: [id number]  Today's Date: 2019      Visit Date: 2019  Visit   Return to MD:BISI  Re-cert date: 19  Patient Active Problem List   Diagnosis   • SOB (shortness of breath)   • Essential hypertension   • Pure hyperglyceridemia   • Chest pain   • YIMI (acute kidney injury) (CMS/HCC)   • Palpitations        Past Medical History:   Diagnosis Date   • Cervical spinal stenosis    • Dysuria    • Fibromyalgia    • GERD (gastroesophageal reflux disease)    • Hyperlipidemia    • Hypertension    • Osteoporosis    • Urinary tract infectious disease         Past Surgical History:   Procedure Laterality Date   • BACK SURGERY     • HYSTERECTOMY     • KNEE ARTHROPLASTY     • SINUS SURGERY         Visit Dx:     ICD-10-CM ICD-9-CM   1. Cervical spinal stenosis M48.02 723.0       Medications (Admitted on 2019)     acetaminophen (TYLENOL) 500 MG tablet     CBD (cannabidiol) oral oil     escitalopram (LEXAPRO) 20 MG tablet     estradiol (ESTRACE) 0.5 MG tablet     irbesartan (AVAPRO) 150 MG tablet     metoprolol succinate XL (TOPROL-XL) 100 MG 24 hr tablet     NIACIN PO     omeprazole (priLOSEC) 20 MG capsule     pravastatin (PRAVACHOL) 20 MG tablet     temazepam (RESTORIL) 30 MG capsule     traMADol (ULTRAM) 50 MG tablet     triamcinolone (KENALOG) 0.5 % cream        Allergies: Ace Inhibitors, Gabapentin, Gemfibrozil, Oxycodone, Doxycyline, Morphine Sulfate, Nickel, Sulfa, Tapentadol    PT Ortho     Row Name 19 1600       Subjective Comments    Subjective Comments  65 yo female with h/o cervical spinal stenosis, told by MD has 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 Had skilled PT here back in 2017 with good resolution of her pain at the time. Had a recurrence of neck pain with current episode starting about 6 months ago. Insidious onset.   -BS       Precautions and Contraindications     Precautions/Limitations  --  -BS    Precautions  Tejon L, osteoporosis  -BS       Subjective Pain    Able to rate subjective pain?  yes  -BS    Pre-Treatment Pain Level  9  -BS       Posture/Observations    Posture/Observations Comments  severe TTP along R UT muscle and R levator scapula  -BS       Quarter Clearing    Quarter Clearing  Upper Quarter Clearing  -BS       Neural Tension Signs- Upper Quarter Clearing    ULNTT 2  Right:;Postive;Left:;Negative  -BS       Sensory Screen for Light Touch- Upper Quarter Clearing    C4 (posterior shoulder)  Right:;Diminished;Left:;Intact  -BS    C5 (lateral upper arm)  Bilateral:;Intact  -BS    C6 (tip of thumb)  Right:;Diminished;Left:;Intact  -BS    C7 (tip of 3rd finger)  Bilateral:;Intact  -BS    C8 (tip of 5th finger)  Right:;Diminished;Left:;Intact  -BS    T1 (medial lower arm)  Right:;Diminished;Left:;Intact  -BS       Myotomal Screen- Upper Quarter Clearing    Shoulder flexion (C5)  Right:;3+ (Fair +);Left:;5 (Normal)  -BS    Elbow flexion/wrist extension (C6)  Right:;3+ (Fair +);Left:;5 (Normal)  -BS    Elbow extension/wrist flexion (C7)  Right:;3+ (Fair +);Left:;5 (Normal)  -BS    Finger flexion/ (C8)  Right:;4- (Good -);Left:;5 (Normal)  -BS    Finger abduction (T1)  Right:;4 (Good);Left:;5 (Normal)  -BS      Right:;4 (Good);Left:;5 (Normal)  -BS       Cervical/Shoulder ROM Screen    Cervical flexion  Normal  -BS    Cervical extension  Impaired 25% severe limit  -BS    Cervical lateral flexion  Impaired R 25% severe limit, L 50% mod limit  -BS    Cervical rotation  Impaired R 25%, L 75% min limit  -BS       Special Tests/Palpation    Special Tests/Palpation  Cervical/Thoracic  -BS      User Key  (r) = Recorded By, (t) = Taken By, (c) = Cosigned By    Initials Name Provider Type    BS Giovanni Soto, PT Physical Therapist                      Therapy Education  Education Details: HEP: pt ed sitting posture, seated cervical retractions, supine cervical flex w/  self OP  Given: Posture/body mechanics, HEP  Program: New  How Provided: Verbal, Demonstration  Provided to: Patient  Level of Understanding: Verbalized, Demonstrated     PT OP Goals     Row Name 06/06/19 1559 06/06/19 1500       PT Short Term Goals    STG Date to Achieve  06/20/19  -BS  --    STG 1  Pt independent with HEP  -BS  --    STG 1 Progress  New  -BS  --    STG 2  Reduced neck pain by 50% with use of mechanical cervical traction  -BS  --    STG 2 Progress  New  -BS  --    STG 3  Improve cervical spine AROM to minimal limit 75%-all planes  -BS  --    STG 3 Progress  New  -BS  --       Long Term Goals    LTG Date to Achieve  07/04/19  -BS  --    LTG 1  Improve cervical spine AROM to normal limit 100%-all planes  -BS  --    LTG 1 Progress  New  -BS  --    LTG 2  Reduced neck pain by 75% with abolished/resolved R UE radiculopathy  -BS  --    LTG 2 Progress  New  -BS  --    LTG 3  Improve R UE myotome strength (C5-T1) to 4+/5  -BS  --    LTG 3 Progress  New  -BS  --    LTG 4  Reduce NDI score to 25 or less  -BS  --    LTG 4 Progress  New  -BS  --       Time Calculation    PT Goal Re-Cert Due Date  06/27/19  -BS  --  -BS      User Key  (r) = Recorded By, (t) = Taken By, (c) = Cosigned By    Initials Name Provider Type    BS Giovanni Soto, PT Physical Therapist          PT Assessment/Plan     Row Name 06/06/19 1600          PT Assessment    Functional Limitations  Performance in work activities;Performance in sport activities;Performance in self-care ADL;Performance in leisure activities;Limitation in home management  -BS     Impairments  Sensation;Range of motion;Posture;Pain;Muscle strength;Impaired flexibility  -BS     Assessment Comments  Neck pain due to cervical spinal stenosis. Evidence of R UE radiculopathy. Reduced neck pain and R UE radicular symptoms with unloaded cervical flexion in supine.  -BS     Please refer to paper survey for additional self-reported information  Yes  -BS     Rehab Potential   Good  -BS     Patient/caregiver participated in establishment of treatment plan and goals  Yes  -BS     Patient would benefit from skilled therapy intervention  Yes  -BS        PT Plan    PT Frequency  2x/week  -BS     Predicted Duration of Therapy Intervention (Therapy Eval)  3-4 weeks  -BS     Planned CPT's?  PT EVAL MOD COMPLELITY: 71872;PT RE-EVAL: 47908;PT THER PROC EA 15 MIN: 99528;PT MANUAL THERAPY EA 15 MIN: 88269;PT HOT OR COLD PACK TREAT MCARE;PT ELECTRICAL STIM UNATTEND: ;PT TRACTION CERVICAL: 57741;PT THER SUPP EA 15 MIN  -BS     Physical Therapy Interventions (Optional Details)  home exercise program;joint mobilization;manual therapy techniques;modalities;neuromuscular re-education;patient/family education;ROM (Range of Motion);strengthening;stretching;postural re-education  -BS     PT Plan Comments  initiate cervical mechanical traction 2x/week per MD orders. In addition, focus on flexion based cervical ROM ex's to reduce R UE radicular symptoms.   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 Had skilled PT here back in 2017 with good resolution of her pain at the time. Had a recurrence of neck pain with current episode starting about 6 months ago. Insidious onset.   -BS         Subjective Pain    Able to rate subjective pain?  yes  -BS      Pre-Treatment Pain Level  9  -BS      Post-Treatment Pain Level  7  -BS         Exercise 1    Exercise Name 1  supine cervical flex w/ self OP  -BS      Sets 1  2  -BS      Reps 1  10  -BS         Exercise 2    Exercise Name 2  seated cervical retractions +/- self OP  -BS      Sets 2  1  -BS      Reps 2  10 ea  -BS         Exercise 3    Exercise Name 3  pt ed  sitting posture w/ lumbar support  -BS      Time 3  5 minutes  -BS        User Key  (r) = Recorded By, (t) = Taken By, (c) = Cosigned By    Initials Name Provider Type    Giovanni Chambers, PT Physical Therapist                        Outcome Measure Options: Neck Disability Index (NDI)  Neck Disability Index  Section 1 - Pain Intensity: The pain is fairly severe at the moment.  Section 2 - Personal Care: It is painful to look after myself, and I am slow and careful.  Section 3 - Lifting: I can lift only very light weights.  Section 4 - Work: I can hardly do any work at all.  Section 5 - Headaches: I have moderate headaches that come frequently  Section 6 - Concentration: I have a fair degree of difficulty concentrating.  Section 7 - Sleeping: My sleep is completely disturbed for up to 5-7 hours.  Section 8 - Driving: I can't drive as long as I want because of moderate neck pain.  Section 9 - Reading: I can't read as much as I want because of moderate neck pain.  Section 10 - Recreation: I can hardly do recreational activities due to neck pain.  Neck Disability Index Score: 33      Time Calculation:     Start Time: 1559  Stop Time: 1647  Time Calculation (min): 48 min  Total Timed Code Minutes- PT: 48 minute(s)     Therapy Charges for Today     Code Description Service Date Service Provider Modifiers Qty    00227649436 HC PT EVAL MOD COMPLEXITY 2 6/6/2019 Giovanni Soto, PT GP 1    81301431526 HC PT THER PROC EA 15 MIN 6/6/2019 Giovanni Soto, PT GP 1          PT G-Codes  Outcome Measure Options: Neck Disability Index (NDI)  Neck Disability Index Score: 33         Giovanni Soto, JATIN  6/6/2019

## 2019-06-12 ENCOUNTER — HOSPITAL ENCOUNTER (OUTPATIENT)
Dept: PHYSICAL THERAPY | Facility: HOSPITAL | Age: 64
Setting detail: THERAPIES SERIES
Discharge: HOME | End: 2019-06-12

## 2019-06-12 DIAGNOSIS — M48.02 CERVICAL SPINAL STENOSIS: Primary | ICD-10-CM

## 2019-06-12 PROCEDURE — G0283 ELEC STIM OTHER THAN WOUND: HCPCS

## 2019-06-12 PROCEDURE — 97012 MECHANICAL TRACTION THERAPY: CPT

## 2019-06-12 PROCEDURE — 97140 MANUAL THERAPY 1/> REGIONS: CPT

## 2019-06-12 NOTE — THERAPY TREATMENT NOTE
Outpatient Physical Therapy Ortho Treatment Note  [facility]     Patient Name: Maude Joyner  : 1955  MRN: [id number]  Today's Date: 2019      Visit Date: 2019     Subjective improvement 0  Visits 2/2  Visits approved medicare cap  RTMD PRN  Recert Date     Cervical pain    Visit Dx:    ICD-10-CM ICD-9-CM   1. Cervical spinal stenosis M48.02 723.0       Patient Active Problem List   Diagnosis   • SOB (shortness of breath)   • Essential hypertension   • Pure hyperglyceridemia   • Chest pain   • YIMI (acute kidney injury) (CMS/[facility])   • Palpitations        Past Medical History:   Diagnosis Date   • Cervical spinal stenosis    • Dysuria    • Fibromyalgia    • GERD (gastroesophageal reflux disease)    • Hyperlipidemia    • Hypertension    • Osteoporosis    • Urinary tract infectious disease         Past Surgical History:   Procedure Laterality Date   • BACK SURGERY     • HYSTERECTOMY     • KNEE ARTHROPLASTY     • SINUS SURGERY                         PT Assessment/Plan     Row Name 19 7850          PT Assessment    Assessment Comments  Patient cspine rotation corrected by ME.  Tolerated mechanical cervical traction well.  -CP        PT Plan    PT Frequency  2x/week  -CP     Predicted Duration of Therapy Intervention (Therapy Eval)  3-4 weeks  -CP     PT Plan Comments  Cont with POC.  HEP of UT and LT stretches  -CP       User Key  (r) = Recorded By, (t) = Taken By, (c) = Cosigned By    Initials Name Provider Type    Gloria Smalls PTA Physical Therapy Assistant          Modalities     Row Name 19 9788             Subjective Comments    Subjective Comments  Patient states that she has bone on bone.  -CP         Subjective Pain    Able to rate subjective pain?  yes  -CP      Pre-Treatment Pain Level  7  -CP      Post-Treatment Pain Level  0  -CP         Moist Heat    MH Applied  Yes  -CP      Location  cspine  -CP      Rx Minutes  10 mins  -CP      MH Prior to Rx  Yes   -CP      MH S/P Rx  Yes  -CP         ELECTRICAL STIMULATION    Attended/Unattended  Unattended  -CP      Stimulation Type  IFC  -CP      Location/Electrode Placement/Other  cspine  -CP       PT E-Stim Unattended (Manual) Minutes  20  -CP         Traction 10781    Traction Type  Cervical  -CP      Rx Minutes  15  -CP      Duration  Intermittent  -CP      Position  Hook-lying  -CP      Weight  -- 14/9  -CP        User Key  (r) = Recorded By, (t) = Taken By, (c) = Cosigned By    Initials Name Provider Type    Gloria Smalls, DUANE Physical Therapy Assistant        Exercises     Row Name 06/12/19 1700             Subjective Comments    Subjective Comments  Patient states that she has bone on bone.  -CP         Subjective Pain    Able to rate subjective pain?  yes  -CP      Pre-Treatment Pain Level  7  -CP      Post-Treatment Pain Level  0  -CP        User Key  (r) = Recorded By, (t) = Taken By, (c) = Cosigned By    Initials Name Provider Type    Gloria Smalls, DUANE Physical Therapy Assistant                      Manual Rx (last 36 hours)      Manual Treatments     Row Name 06/12/19 1700             Manual Rx 1    Manual Rx 1 Location  cspine  -CP      Manual Rx 1 Type  distraction  -CP      Manual Rx 1 Duration  5  -CP         Manual Rx 2    Manual Rx 2 Location  cpsine  -CP      Manual Rx 2 Type  gentle ROM  -CP      Manual Rx 2 Duration  2  -CP         Manual Rx 3    Manual Rx 3 Location  cspine  -CP      Manual Rx 3 Type  mobs and glides  -CP      Manual Rx 3 Duration  2  -CP         Manual Rx 4    Manual Rx 4 Location  cspine   -CP      Manual Rx 4 Type  ME to correct left cspine rotation  -CP      Manual Rx 4 Duration  1  -CP        User Key  (r) = Recorded By, (t) = Taken By, (c) = Cosigned By    Initials Name Provider Type    Gloria Smalls, DUANE Physical Therapy Assistant          PT OP Goals     Row Name 06/12/19 1700          PT Short Term Goals    STG Date to Achieve  06/20/19  -CP      STG 1  Pt independent with HEP  -CP     STG 1 Progress  Ongoing  -CP     STG 2  Reduced neck pain by 50% with use of mechanical cervical traction  -CP     STG 2 Progress  Not Met  -CP     STG 3  Improve cervical spine AROM to minimal limit 75%-all planes  -CP     STG 3 Progress  Not Met  -CP        Long Term Goals    LTG Date to Achieve  07/04/19  -CP     LTG 1  Improve cervical spine AROM to normal limit 100%-all planes  -CP     LTG 1 Progress  Progressing  -CP     LTG 2  Reduced neck pain by 75% with abolished/resolved R UE radiculopathy  -CP     LTG 2 Progress  Not Met  -CP     LTG 3  Improve R UE myotome strength (C5-T1) to 4+/5  -CP     LTG 3 Progress  Not Met  -CP     LTG 4  Reduce NDI score to 25 or less  -CP     LTG 4 Progress  Not Met  -CP        Time Calculation    PT Goal Re-Cert Due Date  06/27/19  -CP       User Key  (r) = Recorded By, (t) = Taken By, (c) = Cosigned By    Initials Name Provider Type    CP Gloria Robbins, DUANE Physical Therapy Assistant                         Time Calculation:   Start Time: 1520  Stop Time: 1625  Time Calculation (min): 65 min  Total Timed Code Minutes- PT: 45 minute(s)  Therapy Charges for Today     Code Description Service Date Service Provider Modifiers Qty    42189231000 HC PT THER SUPP EA 15 MIN 6/12/2019 Gloria Robbins, PTA GP 1    79030031929 HC PT TRACTION CERVICAL 6/12/2019 Gloria Robbins, PTA GP 1    79035070788 HC PT MANUAL THERAPY EA 15 MIN 6/12/2019 Gloria Robbins, PTA GP 1    30455599795 HC PT ELECTRICAL STIM UNATTENDED 6/12/2019 Gloria Robbins, PTA  1                    Gloria Robbins PTA  6/12/2019

## 2019-06-18 ENCOUNTER — HOSPITAL ENCOUNTER (OUTPATIENT)
Dept: PHYSICAL THERAPY | Facility: HOSPITAL | Age: 64
Setting detail: THERAPIES SERIES
Discharge: HOME | End: 2019-06-18

## 2019-06-18 DIAGNOSIS — M48.02 CERVICAL SPINAL STENOSIS: Primary | ICD-10-CM

## 2019-06-18 PROCEDURE — G0283 ELEC STIM OTHER THAN WOUND: HCPCS

## 2019-06-18 PROCEDURE — 97140 MANUAL THERAPY 1/> REGIONS: CPT

## 2019-06-18 PROCEDURE — 97012 MECHANICAL TRACTION THERAPY: CPT

## 2019-06-18 NOTE — THERAPY TREATMENT NOTE
Outpatient Physical Therapy Ortho Treatment Note  [facility]     Patient Name: Maude Joyner  : 1955  MRN: [id number]  Today's Date: 2019      Visit Date: 2019     Subjective Improvement some  Visits 3/3  Visits approved medicare cap  RTMD PRn  Recert Date 2019    Cervical pain    Visit Dx:    ICD-10-CM ICD-9-CM   1. Cervical spinal stenosis M48.02 723.0       Patient Active Problem List   Diagnosis   • SOB (shortness of breath)   • Essential hypertension   • Pure hyperglyceridemia   • Chest pain   • YIMI (acute kidney injury) (CMS/[facility])   • Palpitations        Past Medical History:   Diagnosis Date   • Cervical spinal stenosis    • Dysuria    • Fibromyalgia    • GERD (gastroesophageal reflux disease)    • Hyperlipidemia    • Hypertension    • Osteoporosis    • Urinary tract infectious disease         Past Surgical History:   Procedure Laterality Date   • BACK SURGERY     • HYSTERECTOMY     • KNEE ARTHROPLASTY     • SINUS SURGERY                         PT Assessment/Plan     Row Name 19 1439          PT Assessment    Assessment Comments  Patient has full PROM of Cspine for all motions.  ME did correct left cspine rotation.  Reported no pain after therapy  -CP        PT Plan    PT Frequency  2x/week  -CP     Predicted Duration of Therapy Intervention (Therapy Eval)  3-4 weeks  -CP     PT Plan Comments  Cont with POC>  Scap rows next.  Recheck scheduled for next week  -CP       User Key  (r) = Recorded By, (t) = Taken By, (c) = Cosigned By    Initials Name Provider Type    Gloria Smalls, PTA Physical Therapy Assistant          Modalities     Row Name 19 1300             Subjective Pain    Post-Treatment Pain Level  0  -CP         Moist Heat    MH Applied  Yes  -CP      Location  cspine  -CP      Rx Minutes  10 mins  -CP         ELECTRICAL STIMULATION    Attended/Unattended  Unattended  -CP      Stimulation Type  IFC  -CP      Location/Electrode Placement/Other   spine  -CP       PT E-Stim Unattended (Manual) Minutes  15  -CP         Traction 07141    Traction Type  Cervical  -CP      Rx Minutes  15  -CP      Duration  Intermittent  -CP      Position  Hook-lying  -CP      Weight  -- 15/10  -CP        User Key  (r) = Recorded By, (t) = Taken By, (c) = Cosigned By    Initials Name Provider Type    CP Gloria Robbins, DUANE Physical Therapy Assistant        Exercises     Row Name 06/18/19 1300             Subjective Comments    Subjective Comments  States that she received relief after last therapy visit.    -CP         Subjective Pain    Able to rate subjective pain?  yes  -CP      Pre-Treatment Pain Level  6  -CP      Post-Treatment Pain Level  0  -CP         Exercise 1    Exercise Name 1  MHP  -CP      Time 1  10  -CP         Exercise 2    Exercise Name 2  UT/LT stretch  -CP      Cueing 2  Verbal;Demo  -CP      Sets 2  3  -CP      Time 2  30  -CP         Exercise 3    Exercise Name 3  doorway stretch  -CP      Cueing 3  Verbal;Demo  -CP         Exercise 4    Exercise Name 4  see manual  -CP         Exercise 5    Exercise Name 5  see modalities  -CP        User Key  (r) = Recorded By, (t) = Taken By, (c) = Cosigned By    Initials Name Provider Type    CP Gloria Robbins, DUANE Physical Therapy Assistant                      Manual Rx (last 36 hours)      Manual Treatments     Row Name 06/18/19 1300             Manual Rx 1    Manual Rx 1 Location  cspine   -CP      Manual Rx 1 Type  distraction  -CP      Manual Rx 1 Duration  5  -CP         Manual Rx 2    Manual Rx 2 Location  cspine  -CP      Manual Rx 2 Type  gentle ROM  -CP      Manual Rx 2 Duration  2  -CP         Manual Rx 3    Manual Rx 3 Location  Cspine  -CP      Manual Rx 3 Type  mobs and glides  -CP      Manual Rx 3 Duration  4  -CP         Manual Rx 4    Manual Rx 4 Location  cspine  -CP      Manual Rx 4 Type  ME to correct left cspine rotation  -CP      Manual Rx 4 Duration  2  -CP         Manual Rx 5     Manual Rx 5 Location  cspine  -CP      Manual Rx 5 Type  occ release  -CP      Manual Rx 5 Duration  2  -CP        User Key  (r) = Recorded By, (t) = Taken By, (c) = Cosigned By    Initials Name Provider Type    Gloria Smalls PTA Physical Therapy Assistant          PT OP Goals     Row Name 06/18/19 1300          PT Short Term Goals    STG Date to Achieve  06/20/19  -CP     STG 1  Pt independent with HEP  -CP     STG 1 Progress  Ongoing  -CP     STG 2  Reduced neck pain by 50% with use of mechanical cervical traction  -CP     STG 2 Progress  Not Met  -CP     STG 3  Improve cervical spine AROM to minimal limit 75%-all planes  -CP     STG 3 Progress  Not Met  -CP        Long Term Goals    LTG Date to Achieve  07/04/19  -CP     LTG 1  Improve cervical spine AROM to normal limit 100%-all planes  -CP     LTG 1 Progress  Progressing  -CP     LTG 2  Reduced neck pain by 75% with abolished/resolved R UE radiculopathy  -CP     LTG 2 Progress  Not Met  -CP     LTG 3  Improve R UE myotome strength (C5-T1) to 4+/5  -CP     LTG 3 Progress  Not Met  -CP     LTG 4  Reduce NDI score to 25 or less  -CP     LTG 4 Progress  Not Met  -CP        Time Calculation    PT Goal Re-Cert Due Date  06/27/19  -CP       User Key  (r) = Recorded By, (t) = Taken By, (c) = Cosigned By    Initials Name Provider Type    CP Gloria Robbins PTA Physical Therapy Assistant          Therapy Education  Education Details: UT/LT and doorway stretches  Given: HEP  Program: New  How Provided: Verbal, Demonstration, Written  Provided to: Patient  Level of Understanding: Teach back education performed, Verbalized, Demonstrated              Time Calculation:   Start Time: 1303  Stop Time: 1410  Time Calculation (min): 67 min  Total Timed Code Minutes- PT: 33 minute(s)  Therapy Charges for Today     Code Description Service Date Service Provider Modifiers Qty    36418805270 HC PT THER SUPP EA 15 MIN 6/18/2019 Gloria Robbins PTA GP 1    05707935092 HC  PT ELECTRICAL STIM UNATTENDED 6/18/2019 Gloria Robbins, PTA  1    03451850875 HC PT TRACTION CERVICAL 6/18/2019 Gloria Robbins, PTA GP 1    14709065044 HC PT MANUAL THERAPY EA 15 MIN 6/18/2019 Gloria Robbins, PTA GP 1                    Gloria Robbins, DUANE  6/18/2019

## 2019-06-20 ENCOUNTER — HOSPITAL ENCOUNTER (OUTPATIENT)
Dept: PHYSICAL THERAPY | Facility: HOSPITAL | Age: 64
Setting detail: THERAPIES SERIES
Discharge: HOME | End: 2019-06-20

## 2019-06-20 DIAGNOSIS — M48.02 CERVICAL SPINAL STENOSIS: Primary | ICD-10-CM

## 2019-06-20 PROCEDURE — G0283 ELEC STIM OTHER THAN WOUND: HCPCS

## 2019-06-20 PROCEDURE — 97012 MECHANICAL TRACTION THERAPY: CPT

## 2019-06-20 PROCEDURE — 97140 MANUAL THERAPY 1/> REGIONS: CPT

## 2019-06-20 NOTE — THERAPY TREATMENT NOTE
Outpatient Physical Therapy Neuro Treatment Note  [facility]     Patient Name: Maude Joyner  : 1955  MRN: [id number]  Today's Date: 2019      Visit Date: 2019     Subjective Improvement 85%  Visits 4/4  Visits Approved medicare cap  RTMD PRN  Recert Date 2019    Cervical Pain    Visit Dx:    ICD-10-CM ICD-9-CM   1. Cervical spinal stenosis M48.02 723.0       Patient Active Problem List   Diagnosis   • SOB (shortness of breath)   • Essential hypertension   • Pure hyperglyceridemia   • Chest pain   • YIMI (acute kidney injury) (CMS/[facility])   • Palpitations                       PT Assessment/Plan     Row Name 19 1600          PT Assessment    Assessment Comments  Patient had no cspine rotation noted this date.  She was TTP to right side cspine paraspinals.  Appears to be progressing nicely  -CP        PT Plan    PT Frequency  2x/week  -CP     Predicted Duration of Therapy Intervention (Therapy Eval)  3-4 weeks  -CP     PT Plan Comments  Cont with POC.  Recheck next visit  -CP       User Key  (r) = Recorded By, (t) = Taken By, (c) = Cosigned By    Initials Name Provider Type    Gloria Smalls, PTA Physical Therapy Assistant           Modalities     Row Name 19 1500             Subjective Comments    Subjective Comments  Reprots decrease neck pain.  No pain on left but does have some pain on the right side of the neck now.  -CP         Subjective Pain    Able to rate subjective pain?  yes  -CP      Pre-Treatment Pain Level  2  -CP      Post-Treatment Pain Level  0  -CP         Moist Heat    MH Applied  Yes  -CP      Location  cspine  -CP      Rx Minutes  10 mins  -CP      MH Prior to Rx  Yes  -CP      MH S/P Rx  Yes  -CP         ELECTRICAL STIMULATION    Attended/Unattended  Unattended  -CP      Stimulation Type  IFC  -CP      Location/Electrode Placement/Other  spine  -CP       PT E-Stim Unattended (Manual) Minutes  15  -CP         Traction 31293    Traction Type   Cervical  -CP      Rx Minutes  15  -CP      Duration  Intermittent  -CP      Position  Hook-lying  -CP      Weight  -- 16/10  -CP        User Key  (r) = Recorded By, (t) = Taken By, (c) = Cosigned By    Initials Name Provider Type    Gloria Smalls PTA Physical Therapy Assistant        Exercises     Row Name 06/20/19 1500             Subjective Comments    Subjective Comments  Reprots decrease neck pain.  No pain on left but does have some pain on the right side of the neck now.  -CP         Subjective Pain    Able to rate subjective pain?  yes  -CP      Pre-Treatment Pain Level  2  -CP      Post-Treatment Pain Level  0  -CP         Exercise 1    Exercise Name 1  MHP  -CP      Time 1  10  -CP         Exercise 2    Exercise Name 2  supine chin tucks  -CP      Cueing 2  Verbal;Tactile  -CP      Sets 2  2  -CP      Reps 2  10  -CP         Exercise 3    Exercise Name 3  see manual  -CP        User Key  (r) = Recorded By, (t) = Taken By, (c) = Cosigned By    Initials Name Provider Type    Gloria Smalls PTA Physical Therapy Assistant                     Manual Rx (last 36 hours)      Manual Treatments     Row Name 06/20/19 1500             Manual Rx 1    Manual Rx 1 Location  cspine   -CP      Manual Rx 1 Type  distrction  -CP      Manual Rx 1 Duration  5  -CP         Manual Rx 2    Manual Rx 2 Location  cspine   -CP      Manual Rx 2 Type  gentle ROM  -CP      Manual Rx 2 Duration  3  -CP         Manual Rx 3    Manual Rx 3 Location  cspine  -CP      Manual Rx 3 Type  MFR  -CP      Manual Rx 3 Duration  4  -CP         Manual Rx 4    Manual Rx 4 Location  first rib  -CP      Manual Rx 4 Type  mobs  -CP      Manual Rx 4 Grade  Grade I and II  -CP      Manual Rx 4 Duration  2  -CP        User Key  (r) = Recorded By, (t) = Taken By, (c) = Cosigned By    Initials Name Provider Type    Gloria Smalls, DUANE Physical Therapy Assistant          PT OP Goals     Row Name 06/20/19 1600          PT Short Term Goals     STG Date to Achieve  06/20/19  -CP     STG 1  Pt independent with HEP  -CP     STG 1 Progress  Ongoing  -CP     STG 2  Reduced neck pain by 50% with use of mechanical cervical traction  -CP     STG 2 Progress  Met  -CP     STG 3  Improve cervical spine AROM to minimal limit 75%-all planes  -CP     STG 3 Progress  Progressing  -CP        Long Term Goals    LTG Date to Achieve  07/04/19  -CP     LTG 1  Improve cervical spine AROM to normal limit 100%-all planes  -CP     LTG 1 Progress  Progressing  -CP     LTG 2  Reduced neck pain by 75% with abolished/resolved R UE radiculopathy  -CP     LTG 2 Progress  Not Met  -CP     LTG 3  Improve R UE myotome strength (C5-T1) to 4+/5  -CP     LTG 3 Progress  Not Met  -CP     LTG 4  Reduce NDI score to 25 or less  -CP     LTG 4 Progress  Not Met  -CP       User Key  (r) = Recorded By, (t) = Taken By, (c) = Cosigned By    Initials Name Provider Type    CP Gloria Robbins, DUANE Physical Therapy Assistant          Therapy Education  Education Details: supine chin tucks  Given: HEP  Program: New  How Provided: Verbal, Demonstration, Written  Provided to: Patient  Level of Understanding: Teach back education performed, Verbalized, Demonstrated              Time Calculation:   Start Time: 1430  Stop Time: 1540  Time Calculation (min): 70 min  Total Timed Code Minutes- PT: 33 minute(s)   Therapy Charges for Today     Code Description Service Date Service Provider Modifiers Qty    03119110844 HC PT THER SUPP EA 15 MIN 6/20/2019 Gloria Robbins, PTA GP 1    32623715731 HC PT ELECTRICAL STIM UNATTENDED 6/20/2019 Gloria Robbins, PTA  1    36523155644 HC PT TRACTION CERVICAL 6/20/2019 Gloria Robbins, PTA GP 1    [id number] HC PT MANUAL THERAPY EA 15 MIN 6/20/2019 Gloria Robbins, DUANE GP 1                    Gloria Robbins PTA  6/20/2019

## 2019-06-26 ENCOUNTER — APPOINTMENT (OUTPATIENT)
Dept: PHYSICAL THERAPY | Facility: HOSPITAL | Age: 64
End: 2019-06-26

## 2019-07-01 ENCOUNTER — APPOINTMENT (OUTPATIENT)
Dept: PHYSICAL THERAPY | Facility: HOSPITAL | Age: 64
End: 2019-07-01

## 2019-07-08 ENCOUNTER — HOSPITAL ENCOUNTER (OUTPATIENT)
Dept: PHYSICAL THERAPY | Facility: HOSPITAL | Age: 64
Setting detail: THERAPIES SERIES
Discharge: HOME | End: 2019-07-08

## 2019-07-08 DIAGNOSIS — M48.02 CERVICAL SPINAL STENOSIS: Primary | ICD-10-CM

## 2019-07-08 PROCEDURE — 97110 THERAPEUTIC EXERCISES: CPT | Performed by: PHYSICAL THERAPIST

## 2019-07-08 PROCEDURE — 97012 MECHANICAL TRACTION THERAPY: CPT | Performed by: PHYSICAL THERAPIST

## 2019-07-08 NOTE — THERAPY PROGRESS REPORT/RE-CERT
Outpatient Physical Therapy Ortho Progress Note  [facility]     Patient Name: Maude Joyner  : 1955  MRN: [id number]  Today's Date: 2019      Visit Date: 2019  Subjective Improvement 60-85%  Visits 5/5  Visits Approved medicare cap  RTMD PRN  Recert Date 2019      Patient Active Problem List   Diagnosis   • SOB (shortness of breath)   • Essential hypertension   • Pure hyperglyceridemia   • Chest pain   • YIMI (acute kidney injury) (CMS/HCC)   • Palpitations        Past Medical History:   Diagnosis Date   • Cervical spinal stenosis    • Dysuria    • Fibromyalgia    • GERD (gastroesophageal reflux disease)    • Hyperlipidemia    • Hypertension    • Osteoporosis    • Urinary tract infectious disease         Past Surgical History:   Procedure Laterality Date   • BACK SURGERY     • HYSTERECTOMY     • KNEE ARTHROPLASTY     • SINUS SURGERY         Visit Dx:     ICD-10-CM ICD-9-CM   1. Cervical spinal stenosis M48.02 723.0             PT Ortho     Row Name 19 1600       Subjective Comments    Subjective Comments  60% improvement overall.   -BS       Precautions and Contraindications    Precautions  [place]  -BS       Subjective Pain    Able to rate subjective pain?  yes  -BS    Pre-Treatment Pain Level  4  -BS       Sensory Screen for Light Touch- Upper Quarter Clearing    C4 (posterior shoulder)  Right:;Intact  -BS    C5 (lateral upper arm)  Bilateral:;Intact  -BS    C6 (tip of thumb)  Right:;Intact  -BS    C7 (tip of 3rd finger)  Right:;Intact  -BS    C8 (tip of 5th finger)  Right:;Intact  -BS    T1 (medial lower arm)  Right:;Intact  -BS       Myotomal Screen- Upper Quarter Clearing    Shoulder flexion (C5)  Right:;5 (Normal)  -BS    Elbow flexion/wrist extension (C6)  Right:;5 (Normal)  -BS    Elbow extension/wrist flexion (C7)  Right:;5 (Normal)  -BS    Finger flexion/ (C8)  Right:;5 (Normal)  -BS    Finger abduction (T1)  Right:;5 (Normal)  -BS      Right:;5 (Normal)  -BS        Cervical/Shoulder ROM Screen    Cervical flexion  Impaired 75%  -BS    Cervical extension  Normal  -BS    Cervical lateral flexion  Impaired 50% B  -BS    Cervical rotation  Impaired R 75%, L WFL  -BS      User Key  (r) = Recorded By, (t) = Taken By, (c) = Cosigned By    Initials Name Provider Type    Giovanni Chambers, PT Physical Therapist                      Therapy Education  Education Details: HEP: standing cervical flex w/ self OP  Given: HEP  Program: New, Reinforced  How Provided: Verbal, Demonstration  Provided to: Patient  Level of Understanding: Teach back education performed     PT OP Goals     Row Name 07/08/19 1649 07/08/19 1600       PT Short Term Goals    STG Date to Achieve  07/22/19  -BS  --    STG 1  Pt independent with HEP  -BS  --    STG 1 Progress  Ongoing  -BS  --    STG 2  Reduced neck pain by 50% with use of mechanical cervical traction  -BS  --    STG 2 Progress  Met  -BS  --    STG 3  Improve cervical spine AROM to minimal limit 75%-all planes  -BS  --    STG 3 Progress  Met  -BS  --       Long Term Goals    LTG Date to Achieve  07/29/19  -BS  --    LTG 1  Improve cervical spine AROM to normal limit 100%-all planes  -BS  --    LTG 1 Progress  Progressing  -BS  --    LTG 2  Reduced neck pain by 75% with abolished/resolved R UE radiculopathy  -BS  --    LTG 2 Progress  Progressing  -BS  --    LTG 3  Improve R UE myotome strength (C5-T1) to 4+/5  -BS  --    LTG 3 Progress  Met  -BS  --    LTG 4  Reduce NDI score to 25 or less  -BS  --    LTG 4 Progress  Met  -BS  --       Time Calculation    PT Goal Re-Cert Due Date  07/29/19  -BS  --  -BS      User Key  (r) = Recorded By, (t) = Taken By, (c) = Cosigned By    Initials Name Provider Type    Giovanni Chambers, PT Physical Therapist          PT Assessment/Plan     Row Name 07/08/19 1649          PT Assessment    Functional Limitations  Performance in work activities;Performance in sport activities;Performance in self-care ADL;Performance in  leisure activities;Limitation in home management  -BS     Impairments  Sensation;Range of motion;Posture;Pain;Muscle strength;Impaired flexibility  -BS     Assessment Comments  pt with reduced neck pain post tx.  -BS     Please refer to paper survey for additional self-reported information  Yes  -BS     Rehab Potential  Good  -BS     Patient/caregiver participated in establishment of treatment plan and goals  Yes  -BS     Patient would benefit from skilled therapy intervention  Yes  -BS        PT Plan    PT Frequency  2x/week  -BS     Predicted Duration of Therapy Intervention (Therapy Eval)  2-3 weeks  -BS     Planned CPT's?  PT RE-EVAL: 56816;PT THER PROC EA 15 MIN: 14252;PT MANUAL THERAPY EA 15 MIN: 87285;PT NEUROMUSC RE-EDUCATION EA 15 MIN: 96368;PT TRACTION CERVICAL: 72646;PT ELECTRICAL STIM UNATTEND: ;PT HOT OR COLD PACK TREAT [facility];PT THER SUPP EA 15 MIN  -BS     Physical Therapy Interventions (Optional Details)  home exercise program;joint mobilization;manual therapy techniques;modalities;neuromuscular re-education;patient/family education;postural re-education;ROM (Range of Motion);strengthening;stretching  -BS     PT Plan Comments  continue traction x 2 visits, then transition to postural ed coupled with scapular stabilization  -BS       User Key  (r) = Recorded By, (t) = Taken By, (c) = Cosigned By    Initials Name Provider Type    Giovanni Chambers, PT Physical Therapist          Modalities     Row Name 07/08/19 1600             Traction 40719    Traction Type  Cervical  -BS      Rx Minutes  15  -BS      Duration  Intermittent  -BS      Position  Hook-lying  -BS      Weight  -- 17/10  -BS      Hold  60  -BS      Relax  20  -BS        User Key  (r) = Recorded By, (t) = Taken By, (c) = Cosigned By    Initials Name Provider Type    Giovanni Chambers, PT Physical Therapist        Exercises     Row Name 07/08/19 1600             Subjective Comments    Subjective Comments  60% improvement overall.   -BS          Subjective Pain    Able to rate subjective pain?  yes  -BS      Pre-Treatment Pain Level  4  -BS      Post-Treatment Pain Level  0  -BS         Exercise 1    Exercise Name 1  seated cervical flex w/ self OP  -BS      Sets 1  1  -BS      Reps 1  10  -BS         Exercise 2    Exercise Name 2  standing cervical flex w/ self OP  -BS      Sets 2  1  -BS      Reps 2  10  -BS         Exercise 3    Exercise Name 3  doorway stretch  -BS         Exercise 4    Exercise Name 4  seated cervical retractions w/ self OP  -BS      Sets 4  1  -BS      Reps 4  5   -BS      Time 4  increased neck pain  -BS         Exercise 5    Exercise Name 5  see modalities  -BS        User Key  (r) = Recorded By, (t) = Taken By, (c) = Cosigned By    Initials Name Provider Type    BS Giovanni Soto, PT Physical Therapist                        Outcome Measure Options: Neck Disability Index (NDI)  Neck Disability Index  Section 1 - Pain Intensity: The pain is very mild at the moment.  Section 2 - Personal Care: It is painful to look after myself, and I am slow and careful.  Section 3 - Lifting: I can lift only very light weights.  Section 4 - Work: I can hardly do any work at all.  Section 5 - Headaches: I have slight headaches that come infrequently.  Section 6 - Concentration: I have a fair degree of difficulty concentrating.  Section 7 - Sleeping: I have no trouble sleeping.  Section 8 - Driving: I can drive as long as I want with slight neck pain.  Section 9 - Reading: I can read as much as I want with slight neck pain.  Section 10 - Recreation: I can hardly do recreational activities due to neck pain.  Neck Disability Index Score: 20      Time Calculation:     Start Time: 1649  Stop Time: 1738  Time Calculation (min): 49 min  Total Timed Code Minutes- PT: 34 minute(s)     Therapy Charges for Today     Code Description Service Date Service Provider Modifiers Qty    87429447634 HC PT THER PROC EA 15 MIN 7/8/2019 Giovanni Soto, PT GP 2     [id number]  PT-TRACTION MECHANICAL 7/8/2019 Giovanni Soto, PT  1          PT G-Codes  Outcome Measure Options: Neck Disability Index (NDI)  Neck Disability Index Score: 20         Giovanni Soto, PT  7/8/2019

## 2019-07-10 ENCOUNTER — HOSPITAL ENCOUNTER (OUTPATIENT)
Dept: PHYSICAL THERAPY | Facility: HOSPITAL | Age: 64
Setting detail: THERAPIES SERIES
Discharge: HOME | End: 2019-07-10

## 2019-07-10 DIAGNOSIS — M48.02 CERVICAL SPINAL STENOSIS: Primary | ICD-10-CM

## 2019-07-10 PROCEDURE — 97140 MANUAL THERAPY 1/> REGIONS: CPT

## 2019-07-10 PROCEDURE — G0283 ELEC STIM OTHER THAN WOUND: HCPCS

## 2019-07-10 PROCEDURE — 97012 MECHANICAL TRACTION THERAPY: CPT

## 2019-07-10 NOTE — THERAPY TREATMENT NOTE
Outpatient Physical Therapy Ortho Treatment Note  [facility]     Patient Name: Maude Joyner  : 1955  MRN: [id number]  Today's Date: 7/10/2019      Visit Date: 07/10/2019     Subjective Improvement 85%  Visits 6/8  Visits approved medicare cap  RTMD PRN  recert Date 2019    Cervical pain    Visit Dx:    ICD-10-CM ICD-9-CM   1. Cervical spinal stenosis M48.02 723.0       Patient Active Problem List   Diagnosis   • SOB (shortness of breath)   • Essential hypertension   • Pure hyperglyceridemia   • Chest pain   • YIMI (acute kidney injury) (CMS/[facility])   • Palpitations        Past Medical History:   Diagnosis Date   • Cervical spinal stenosis    • Dysuria    • Fibromyalgia    • GERD (gastroesophageal reflux disease)    • Hyperlipidemia    • Hypertension    • Osteoporosis    • Urinary tract infectious disease         Past Surgical History:   Procedure Laterality Date   • BACK SURGERY     • HYSTERECTOMY     • KNEE ARTHROPLASTY     • SINUS SURGERY         PT Ortho     Row Name 19 1600       Subjective Comments    Subjective Comments  60% improvement overall.   -BS       Precautions and Contraindications    Precautions  [place]  -BS       Subjective Pain    Able to rate subjective pain?  yes  -BS    Pre-Treatment Pain Level  4  -BS       Sensory Screen for Light Touch- Upper Quarter Clearing    C4 (posterior shoulder)  Right:;Intact  -BS    C5 (lateral upper arm)  Bilateral:;Intact  -BS    C6 (tip of thumb)  Right:;Intact  -BS    C7 (tip of 3rd finger)  Right:;Intact  -BS    C8 (tip of 5th finger)  Right:;Intact  -BS    T1 (medial lower arm)  Right:;Intact  -BS       Myotomal Screen- Upper Quarter Clearing    Shoulder flexion (C5)  Right:;5 (Normal)  -BS    Elbow flexion/wrist extension (C6)  Right:;5 (Normal)  -BS    Elbow extension/wrist flexion (C7)  Right:;5 (Normal)  -BS    Finger flexion/ (C8)  Right:;5 (Normal)  -BS    Finger abduction (T1)  Right:;5 (Normal)  -BS      Right:;5 (Normal)   -BS       Cervical/Shoulder ROM Screen    Cervical flexion  Impaired 75%  -BS    Cervical extension  Normal  -BS    Cervical lateral flexion  Impaired 50% B  -BS    Cervical rotation  Impaired R 75%, L WFL  -BS      User Key  (r) = Recorded By, (t) = Taken By, (c) = Cosigned By    Initials Name Provider Type    BS Giovanni Soto, PT Physical Therapist                      PT Assessment/Plan     Row Name 07/10/19 1310          PT Assessment    Assessment Comments  No rotation noted in cspine this date.  Patient also had decrease TTP to bilateral paraspinals.  -CP        PT Plan    PT Frequency  2x/week  -CP     Predicted Duration of Therapy Intervention (Therapy Eval)  2-3 weeks  -CP     PT Plan Comments  Cont with POC.  one more mechanical traction Per last PT eval.    -CP       User Key  (r) = Recorded By, (t) = Taken By, (c) = Cosigned By    Initials Name Provider Type    Gloria Smalls, PTA Physical Therapy Assistant          Modalities     Row Name 07/10/19 1100             Subjective Comments    Subjective Comments  Patient reports increase neck pain today.  States that she cut grass on her riding  yesterday.  she will have back surgery soon.  They are finding a good date for the surgery.  -CP         Subjective Pain    Able to rate subjective pain?  yes  -CP      Pre-Treatment Pain Level  6  -CP         Ice    Ice Applied  Yes  -CP      Location  cspine  -CP      Rx Minutes  15 mins  -CP      Ice S/P Rx  Yes  -CP         ELECTRICAL STIMULATION    Attended/Unattended  Unattended  -CP      Stimulation Type  IFC  -CP      Location/Electrode Placement/Other  cspine  -CP       PT E-Stim Unattended (Manual) Minutes  15  -CP         Traction [id number]    Traction Type  Cervical  -CP      Rx Minutes  15  -CP      Duration  Intermittent  -CP      Position  Hook-lying  -CP      Weight  -- 19/13  -CP      Hold  60  -CP      Relax  20  -CP        User Key  (r) = Recorded By, (t) = Taken By, (c) =  Cosigned By    Initials Name Provider Type    Gloria Smalls PTA Physical Therapy Assistant        Exercises     Row Name 07/10/19 1100             Subjective Comments    Subjective Comments  Patient reports increase neck pain today.  States that she cut grass on her riding  yesterday.  she will have back surgery soon.  They are finding a good date for the surgery.  -CP         Subjective Pain    Able to rate subjective pain?  yes  -CP      Pre-Treatment Pain Level  6  -CP      Post-Treatment Pain Level  0  -CP         Exercise 1    Exercise Name 1  MHP  -CP      Time 1  5  -CP         Exercise 2    Exercise Name 2  see manual  -CP         Exercise 3    Exercise Name 3  see modalities  -CP        User Key  (r) = Recorded By, (t) = Taken By, (c) = Cosigned By    Initials Name Provider Type    Gloria Smalls PTA Physical Therapy Assistant                      Manual Rx (last 36 hours)      Manual Treatments     Row Name 07/10/19 1100             Manual Rx 1    Manual Rx 1 Location  cspine   -CP      Manual Rx 1 Type  distraction  -CP      Manual Rx 1 Duration  5  -CP         Manual Rx 2    Manual Rx 2 Location  Cspine ROM  -CP      Manual Rx 2 Duration  5  -CP         Manual Rx 3    Manual Rx 3 Location  cspine   -CP      Manual Rx 3 Type  MFR  -CP      Manual Rx 3 Duration  5  -CP        User Key  (r) = Recorded By, (t) = Taken By, (c) = Cosigned By    Initials Name Provider Type    Gloria Smalls, DUANE Physical Therapy Assistant          PT OP Goals     Row Name 07/10/19 0100          PT Short Term Goals    STG Date to Achieve  07/22/19  -CP     STG 1  Pt independent with HEP  -CP     STG 1 Progress  Ongoing  -CP     STG 2  Reduced neck pain by 50% with use of mechanical cervical traction  -CP     STG 2 Progress  Met  -CP     STG 3  Improve cervical spine AROM to minimal limit 75%-all planes  -CP     STG 3 Progress  Met  -CP        Long Term Goals    LTG Date to Achieve  07/29/19  -CP      LTG 1  Improve cervical spine AROM to normal limit 100%-all planes  -CP     LTG 1 Progress  Progressing  -CP     LTG 2  Reduced neck pain by 75% with abolished/resolved R UE radiculopathy  -CP     LTG 2 Progress  Progressing  -CP     LTG 3  Improve R UE myotome strength (C5-T1) to 4+/5  -CP     LTG 3 Progress  Met  -CP     LTG 4  Reduce NDI score to 25 or less  -CP     LTG 4 Progress  Met  -CP        Time Calculation    PT Goal Re-Cert Due Date  07/29/19  -CP       User Key  (r) = Recorded By, (t) = Taken By, (c) = Cosigned By    Initials Name Provider Type    CP Gloria Robbins, DUANE Physical Therapy Assistant                         Time Calculation:   Start Time: 1100  Stop Time: 1200  Time Calculation (min): 60 min  Total Timed Code Minutes- PT: 30 minute(s)  Therapy Charges for Today     Code Description Service Date Service Provider Modifiers Qty    54224265096 HC PT ELECTRICAL STIM UNATTENDED 7/10/2019 Gloria Robbins, PTA  1    08184615372 HC PT TRACTION CERVICAL 7/10/2019 Gloria Robbins, PTA GP 1    03788856562 HC PT MANUAL THERAPY EA 15 MIN 7/10/2019 Gloria Robbins, PTA GP 1                    Gloria Robbins PTA  7/10/2019

## 2019-07-16 ENCOUNTER — HOSPITAL ENCOUNTER (OUTPATIENT)
Dept: PHYSICAL THERAPY | Facility: HOSPITAL | Age: 64
Setting detail: THERAPIES SERIES
Discharge: HOME | End: 2019-07-16

## 2019-07-16 DIAGNOSIS — M48.02 CERVICAL SPINAL STENOSIS: Primary | ICD-10-CM

## 2019-07-16 PROCEDURE — 97110 THERAPEUTIC EXERCISES: CPT

## 2019-07-16 PROCEDURE — G0283 ELEC STIM OTHER THAN WOUND: HCPCS

## 2019-07-16 PROCEDURE — 97140 MANUAL THERAPY 1/> REGIONS: CPT

## 2019-07-16 NOTE — THERAPY TREATMENT NOTE
Outpatient Physical Therapy Ortho Treatment Note  [facility]     Patient Name: Maude Joyner  : 1955  MRN: [id number]  Today's Date: 2019      Visit Date: 2019     Subjective Improvement 85%  Visits /  Visits approved medicare cap  RTMD PRN  Recert Date 2019    Cervical Pain    Visit Dx:    ICD-10-CM ICD-9-CM   1. Cervical spinal stenosis M48.02 723.0       Patient Active Problem List   Diagnosis   • SOB (shortness of breath)   • Essential hypertension   • Pure hyperglyceridemia   • Chest pain   • YIMI (acute kidney injury) (CMS/[facility])   • Palpitations        Past Medical History:   Diagnosis Date   • Cervical spinal stenosis    • Dysuria    • Fibromyalgia    • GERD (gastroesophageal reflux disease)    • Hyperlipidemia    • Hypertension    • Osteoporosis    • Urinary tract infectious disease         Past Surgical History:   Procedure Laterality Date   • BACK SURGERY     • HYSTERECTOMY     • KNEE ARTHROPLASTY     • SINUS SURGERY                         PT Assessment/Plan     Row Name 19 1129          PT Assessment    Assessment Comments  ME did correct cspjne rotation.  After ME, patient reported no cervical pain.  -CP        PT Plan    PT Frequency  2x/week  -CP     Predicted Duration of Therapy Intervention (Therapy Eval)  2-3 weeks  -CP     PT Plan Comments  Cont with POC.  D/C mechanical traction.  Monitor cspine rotation, postural ex.  -CP       User Key  (r) = Recorded By, (t) = Taken By, (c) = Cosigned By    Initials Name Provider Type    Gloria Smalls, PTA Physical Therapy Assistant          Modalities     Row Name 19 0900             Subjective Comments    Subjective Comments  Patient reports increase neck pain and headaches today.  States her pain is at the base of her neck..  She request   -CP         Subjective Pain    Able to rate subjective pain?  yes  -CP      Pre-Treatment Pain Level  7  -CP         Moist Heat    MH Applied  Yes  -CP      Location   cspine   -CP      Rx Minutes  10 mins  -CP      MH S/P Rx  Yes  -CP         Ice    Ice Applied  Yes  -CP      Location  cspine   -CP      Rx Minutes  -- 20 minutes with iFC  -CP      Ice S/P Rx  Yes  -CP         ELECTRICAL STIMULATION    Attended/Unattended  Unattended  -CP      Stimulation Type  IFC  -CP      Location/Electrode Placement/Other  cspine  -CP       PT E-Stim Unattended (Manual) Minutes  20  -CP        User Key  (r) = Recorded By, (t) = Taken By, (c) = Cosigned By    Initials Name Provider Type    Gloria Smalls, DUANE Physical Therapy Assistant        Exercises     Row Name 07/16/19 0900             Subjective Comments    Subjective Comments  Patient reports increase neck pain and headaches today.  States her pain is at the base of her neck..  She request   -CP         Subjective Pain    Able to rate subjective pain?  yes  -CP      Pre-Treatment Pain Level  7  -CP      Post-Treatment Pain Level  0  -CP         Exercise 1    Exercise Name 1  mhp to cspine  -CP      Time 1  10  -CP         Exercise 2    Exercise Name 2  see manual  -CP         Exercise 3    Exercise Name 3  doorway stretch  -CP      Cueing 3  Verbal  -CP      Sets 3  3  -CP      Time 3  30  -CP         Exercise 4    Exercise Name 4  cybex row  -CP      Cueing 4  Verbal;Demo  -CP      Sets 4  2  -CP      Reps 4  10  -CP      Time 4  40 lb  -CP         Exercise 5    Exercise Name 5  cybex incline pull  -CP      Cueing 5  Verbal;Demo  -CP      Sets 5  2  -CP      Reps 5  10  -CP      Time 5  40 lb  -CP        User Key  (r) = Recorded By, (t) = Taken By, (c) = Cosigned By    Initials Name Provider Type    Gloria Smalls PTA Physical Therapy Assistant                      Manual Rx (last 36 hours)      Manual Treatments     Row Name 07/16/19 1100             Manual Rx 1    Manual Rx 1 Location  cspine distraction  -CP      Manual Rx 1 Duration  10  -CP         Manual Rx 2    Manual Rx 2 Location  cspine mob  -CP      Manual  Rx 2 Type  mobs  -CP      Manual Rx 2 Grade  grade II  -CP      Manual Rx 2 Duration  3  -CP         Manual Rx 3    Manual Rx 3 Location  cspine  -CP      Manual Rx 3 Type  MFR  -CP      Manual Rx 3 Duration  6  -CP         Manual Rx 4    Manual Rx 4 Location  cspine  -CP      Manual Rx 4 Type  MD to correct right cspine rotation  -CP      Manual Rx 4 Duration  3  -CP        User Key  (r) = Recorded By, (t) = Taken By, (c) = Cosigned By    Initials Name Provider Type    CP Gloria Robbins, DUANE Physical Therapy Assistant          PT OP Goals     Row Name 07/16/19 1100          PT Short Term Goals    STG Date to Achieve  07/22/19  -CP     STG 1  Pt independent with HEP  -CP     STG 1 Progress  Ongoing  -CP     STG 2  Reduced neck pain by 50% with use of mechanical cervical traction  -CP     STG 2 Progress  Met  -CP     STG 3  Improve cervical spine AROM to minimal limit 75%-all planes  -CP     STG 3 Progress  Met  -CP        Long Term Goals    LTG Date to Achieve  07/29/19  -CP     LTG 1  Improve cervical spine AROM to normal limit 100%-all planes  -CP     LTG 1 Progress  Progressing  -CP     LTG 2  Reduced neck pain by 75% with abolished/resolved R UE radiculopathy  -CP     LTG 2 Progress  Progressing  -CP     LTG 3  Improve R UE myotome strength (C5-T1) to 4+/5  -CP     LTG 3 Progress  Met  -CP     LTG 4  Reduce NDI score to 25 or less  -CP     LTG 4 Progress  Met  -CP        Time Calculation    PT Goal Re-Cert Due Date  07/29/19  -CP       User Key  (r) = Recorded By, (t) = Taken By, (c) = Cosigned By    Initials Name Provider Type    CP Gloria Robbins, DUANE Physical Therapy Assistant                         Time Calculation:   Start Time: 1021  Stop Time: 1124  Time Calculation (min): 63 min  Total Timed Code Minutes- PT: 30 minute(s)  Therapy Charges for Today     Code Description Service Date Service Provider Modifiers Qty    79472776037 HC PT THER SUPP EA 15 MIN 7/16/2019 Gloria Robbins PTA GP 1     08321140241 HC PT MANUAL THERAPY EA 15 MIN 7/16/2019 Gloria Robbins, PTA GP 1    68100905189 HC PT THER PROC EA 15 MIN 7/16/2019 Gloria Robbins, PTA GP 1    39558749139 HC PT ELECTRICAL STIM UNATTENDED 7/16/2019 Gloria Robbins, PTA  1                    Gloria Robbins, DUANE  7/16/2019

## 2019-07-19 ENCOUNTER — HOSPITAL ENCOUNTER (OUTPATIENT)
Dept: PHYSICAL THERAPY | Facility: HOSPITAL | Age: 64
Setting detail: THERAPIES SERIES
Discharge: HOME | End: 2019-07-19

## 2019-07-19 DIAGNOSIS — M48.02 CERVICAL SPINAL STENOSIS: Primary | ICD-10-CM

## 2019-07-19 PROCEDURE — 97140 MANUAL THERAPY 1/> REGIONS: CPT

## 2019-07-19 PROCEDURE — 97110 THERAPEUTIC EXERCISES: CPT

## 2019-07-19 PROCEDURE — G0283 ELEC STIM OTHER THAN WOUND: HCPCS

## 2019-07-19 NOTE — THERAPY TREATMENT NOTE
Outpatient Physical Therapy Ortho Treatment Note  [facility]     Patient Name: Maude Joyner  : 1955  MRN: [id number]  Today's Date: 2019      Visit Date: 2019     Subjective Improvement 85%  Visits 8/10  Visits approved medicare cap  RTMD PRN  Recert Date 2019    Cervical Pain      Visit Dx:    ICD-10-CM ICD-9-CM   1. Cervical spinal stenosis M48.02 723.0       Patient Active Problem List   Diagnosis   • SOB (shortness of breath)   • Essential hypertension   • Pure hyperglyceridemia   • Chest pain   • YIMI (acute kidney injury) (CMS/[facility])   • Palpitations        Past Medical History:   Diagnosis Date   • Cervical spinal stenosis    • Dysuria    • Fibromyalgia    • GERD (gastroesophageal reflux disease)    • Hyperlipidemia    • Hypertension    • Osteoporosis    • Urinary tract infectious disease         Past Surgical History:   Procedure Laterality Date   • BACK SURGERY     • HYSTERECTOMY     • KNEE ARTHROPLASTY     • SINUS SURGERY                             Modalities     Row Name 19 0900             Ice    Ice Applied  Yes  -CP      Location  cspine  -CP      Rx Minutes  -- 20  -CP      Ice S/P Rx  Yes  -CP         ELECTRICAL STIMULATION    Attended/Unattended  Unattended  -CP      Stimulation Type  IFC  -CP      Location/Electrode Placement/Other  cspine  -CP       PT E-Stim Unattended (Manual) Minutes  20  -CP        User Key  (r) = Recorded By, (t) = Taken By, (c) = Cosigned By    Initials Name Provider Type    Gloria Smalls, PTA Physical Therapy Assistant        Exercises     Row Name 19 0900             Subjective Comments    Subjective Comments  Reports decrease pain in neck however her lower back is painful today.  -CP         Subjective Pain    Able to rate subjective pain?  yes  -CP      Pre-Treatment Pain Level  4  -CP      Post-Treatment Pain Level  0  -CP         Exercise 1    Exercise Name 1  Pro II level 2  -CP      Time 1  10  -CP       Additional Comments  FW/BW  -CP         Exercise 2    Exercise Name 2  doorway stretch  -CP      Cueing 2  Verbal;Demo  -CP      Sets 2  3  -CP      Time 2  30  -CP         Exercise 3    Exercise Name 3  scap rows with tband  -CP      Cueing 3  Verbal;Demo  -CP      Sets 3  2  -CP      Reps 3  10  -CP      Time 3  red  -CP      Additional Comments  hep  -CP         Exercise 4    Exercise Name 4  Standing Shoulder ext with tband  -CP      Cueing 4  Verbal;Demo  -CP      Sets 4  2  -CP      Reps 4  10  -CP      Time 4  red  -CP      Additional Comments  hep  -CP         Exercise 5    Exercise Name 5  see manual  -CP        User Key  (r) = Recorded By, (t) = Taken By, (c) = Cosigned By    Initials Name Provider Type    CP Gloria Robbins, PTA Physical Therapy Assistant                      Manual Rx (last 36 hours)      Manual Treatments     Row Name 07/19/19 0900             Manual Rx 1    Manual Rx 1 Location  cspine distraction  -CP      Manual Rx 1 Duration  5  -CP         Manual Rx 2    Manual Rx 2 Location  cspine   -CP      Manual Rx 2 Type  mobs  -CP      Manual Rx 2 Grade  grade II  -CP      Manual Rx 2 Duration  3  -CP         Manual Rx 3    Manual Rx 3 Location  cspine  -CP      Manual Rx 3 Type  glides  -CP      Manual Rx 3 Duration  2  -CP         Manual Rx 4    Manual Rx 4 Location  cspine  -CP      Manual Rx 4 Type  occ release  -CP      Manual Rx 4 Duration  2  -CP        User Key  (r) = Recorded By, (t) = Taken By, (c) = Cosigned By    Initials Name Provider Type    CP Gloria Robbins, PTA Physical Therapy Assistant          PT OP Goals     Row Name 07/19/19 1100          PT Short Term Goals    STG Date to Achieve  07/22/19  -CP     STG 1  Pt independent with HEP  -CP     STG 1 Progress  Ongoing  -CP     STG 2  Reduced neck pain by 50% with use of mechanical cervical traction  -CP     STG 2 Progress  Met  -CP     STG 3  Improve cervical spine AROM to minimal limit 75%-all planes  -CP     STG 3  Progress  Met  -CP        Long Term Goals    LTG Date to Achieve  07/29/19  -CP     LTG 1  Improve cervical spine AROM to normal limit 100%-all planes  -CP     LTG 1 Progress  Progressing  -CP     LTG 2  Reduced neck pain by 75% with abolished/resolved R UE radiculopathy  -CP     LTG 2 Progress  Progressing  -CP     LTG 3  Improve R UE myotome strength (C5-T1) to 4+/5  -CP     LTG 3 Progress  Met  -CP     LTG 4  Reduce NDI score to 25 or less  -CP     LTG 4 Progress  Met  -CP        Time Calculation    PT Goal Re-Cert Due Date  07/29/19  -CP       User Key  (r) = Recorded By, (t) = Taken By, (c) = Cosigned By    Initials Name Provider Type    CP Gloria Robbins PTA Physical Therapy Assistant          Therapy Education  Education Details: standing shoulder ext and scap rows with tband  Given: HEP  Program: New  How Provided: Verbal, Demonstration  Provided to: Patient  Level of Understanding: Teach back education performed, Verbalized, Demonstrated              Time Calculation:   Start Time: 0935  Stop Time: 1040  Time Calculation (min): 65 min  Total Timed Code Minutes- PT: 45 minute(s)  Therapy Charges for Today     Code Description Service Date Service Provider Modifiers Qty    28460090239 HC PT ELECTRICAL STIM UNATTENDED 7/19/2019 Gloria Robbins, DUANE  1    74681734094 HC PT THER PROC EA 15 MIN 7/19/2019 Gloria Robbins, DUANE GP 2    70468471580 HC PT MANUAL THERAPY EA 15 MIN 7/19/2019 Gloria Robbins PTA GP 1                    Gloria Robbins PTA  7/19/2019

## 2019-07-23 ENCOUNTER — APPOINTMENT (OUTPATIENT)
Dept: PHYSICAL THERAPY | Facility: HOSPITAL | Age: 64
End: 2019-07-23

## 2019-07-25 ENCOUNTER — HOSPITAL ENCOUNTER (OUTPATIENT)
Dept: PHYSICAL THERAPY | Facility: HOSPITAL | Age: 64
Setting detail: THERAPIES SERIES
Discharge: HOME | End: 2019-07-25

## 2019-07-25 DIAGNOSIS — M48.02 CERVICAL SPINAL STENOSIS: Primary | ICD-10-CM

## 2019-07-25 PROCEDURE — G0283 ELEC STIM OTHER THAN WOUND: HCPCS

## 2019-07-25 PROCEDURE — 97110 THERAPEUTIC EXERCISES: CPT

## 2019-07-25 NOTE — THERAPY DISCHARGE NOTE
Outpatient Physical Therapy Ortho Treatment Note/Discharge Summary  [facility]     Patient Name: Maude Joyner  : 1955  MRN: [id number]  Today's Date: 2019      Visit Date: 2019    Subjective Improvement 85%  Visits 8/10  Visits approved medicare cap  RTMD PRN  Recert Date 2019    Cervical Pain    Visit Dx:    ICD-10-CM ICD-9-CM   1. Cervical spinal stenosis M48.02 723.0       Patient Active Problem List   Diagnosis   • SOB (shortness of breath)   • Essential hypertension   • Pure hyperglyceridemia   • Chest pain   • YIMI (acute kidney injury) (CMS/[facility])   • Palpitations        Past Medical History:   Diagnosis Date   • Cervical spinal stenosis    • Dysuria    • Fibromyalgia    • GERD (gastroesophageal reflux disease)    • Hyperlipidemia    • Hypertension    • Osteoporosis    • Urinary tract infectious disease         Past Surgical History:   Procedure Laterality Date   • BACK SURGERY     • HYSTERECTOMY     • KNEE ARTHROPLASTY     • SINUS SURGERY                         PT Assessment/Plan     Row Name 19 1519          PT Assessment    Assessment Comments  Patients cervical pain appears to have been resolved prior to patient riding on her .  All goals have been met.   Patient is scheduled for back surgery in August and wishes to be D/C.  -CP        PT Plan    PT Plan Comments  D/C to home with [facility] and one free month fitness membership  -CP       User Key  (r) = Recorded By, (t) = Taken By, (c) = Cosigned By    Initials Name Provider Type    Gloria Smalls, PTA Physical Therapy Assistant          Modalities     Row Name 19 1500             Ice    Ice Applied  Yes  -CP      Location  cspine  -CP      Rx Minutes  -- 20 minutes with iFC  -CP      Ice S/P Rx  Yes  -CP         ELECTRICAL STIMULATION    Attended/Unattended  Unattended  -CP      Stimulation Type  IFC  -CP      Location/Electrode Placement/Other  cspine  -CP       PT E-Stim Unattended (Manual)  Minutes  20  -CP        User Key  (r) = Recorded By, (t) = Taken By, (c) = Cosigned By    Initials Name Provider Type    Gloria Smalls PTA Physical Therapy Assistant          Exercises     Row Name 07/25/19 1500             Subjective Comments    Subjective Comments  Patient states that she didnt have any neck pain for the last couple of days.  Yesterday she rode the riding  for 6 hours and today reprots of neck pain.  Today is her last day,  she will have back surgery on 8-  -CP         Subjective Pain    Able to rate subjective pain?  yes  -CP      Pre-Treatment Pain Level  4  -CP      Post-Treatment Pain Level  0  -CP         Exercise 1    Exercise Name 1  Pro II level 3  -CP      Time 1  12  -CP      Additional Comments  FW/BW  -CP         Exercise 2    Exercise Name 2  see manual  -CP         Exercise 3    Exercise Name 3  see modalities  -CP        User Key  (r) = Recorded By, (t) = Taken By, (c) = Cosigned By    Initials Name Provider Type    Gloria Smalls PTA Physical Therapy Assistant                        Manual Rx (last 36 hours)      Manual Treatments     Row Name 07/25/19 1500             Manual Rx 1    Manual Rx 1 Location  cspine distraction  -CP      Manual Rx 1 Duration  10  -CP         Manual Rx 2    Manual Rx 2 Location  Cspine MFR  -CP      Manual Rx 2 Duration  5  -CP        User Key  (r) = Recorded By, (t) = Taken By, (c) = Cosigned By    Initials Name Provider Type    CP Gloria Robbins PTA Physical Therapy Assistant          PT OP Goals     Row Name 07/25/19 1500          PT Short Term Goals    STG Date to Achieve  07/22/19  -CP     STG 1  Pt independent with HEP  -CP     STG 1 Progress  Met  -CP     STG 2  Reduced neck pain by 50% with use of mechanical cervical traction  -CP     STG 2 Progress  Met  -CP     STG 3  Improve cervical spine AROM to minimal limit 75%-all planes  -CP     STG 3 Progress  Met  -CP        Long Term Goals    LTG Date to Achieve   07/29/19  -CP     LTG 1  Improve cervical spine AROM to normal limit 100%-all planes  -CP     LTG 1 Progress  Met  -CP     LTG 2  Reduced neck pain by 75% with abolished/resolved R UE radiculopathy  -CP     LTG 2 Progress  Met  -CP     LTG 3  Improve R UE myotome strength (C5-T1) to 4+/5  -CP     LTG 3 Progress  Met  -CP     LTG 4  Reduce NDI score to 25 or less  -CP     LTG 4 Progress  Met  -CP        Time Calculation    PT Goal Re-Cert Due Date  07/29/19  -CP       User Key  (r) = Recorded By, (t) = Taken By, (c) = Cosigned By    Initials Name Provider Type    CP Gloria Robbins, PTA Physical Therapy Assistant          Therapy Education  Given: HEP  Program: Reinforced  How Provided: Verbal  Provided to: Patient  Level of Understanding: Verbalized              Time Calculation:   Start Time: 1350  Stop Time: 1453  Time Calculation (min): 63 min  Total Timed Code Minutes- PT: 30 minute(s)  Therapy Charges for Today     Code Description Service Date Service Provider Modifiers Qty    80781331876 HC PT ELECTRICAL STIM UNATTENDED 7/25/2019 Gloria Robbins, PTA  1    60573702153 HC PT THER PROC EA 15 MIN 7/25/2019 Gloria Robbins PTA GP 2                OP PT Discharge Summary  Date of Discharge: 07/25/19  Reason for Discharge: All goals achieved, Patient/Caregiver request  Outcomes Achieved: Able to achieve all goals within established timeline  Discharge Destination: Home with home program  Discharge Instructions/Additional Comments: one month free fitness membership      Gloria Robbins PTA  7/25/2019

## 2020-10-04 ENCOUNTER — HOSPITAL ENCOUNTER (EMERGENCY)
Facility: HOSPITAL | Age: 65
Discharge: HOME | End: 2020-10-05
Attending: EMERGENCY MEDICINE | Admitting: EMERGENCY MEDICINE

## 2020-10-04 ENCOUNTER — APPOINTMENT (OUTPATIENT)
Dept: GENERAL RADIOLOGY | Facility: HOSPITAL | Age: 65
End: 2020-10-04

## 2020-10-04 VITALS
BODY MASS INDEX: 34.53 KG/M2 | TEMPERATURE: 98.1 F | OXYGEN SATURATION: 96 % | WEIGHT: 220 LBS | HEIGHT: 67 IN | SYSTOLIC BLOOD PRESSURE: 152 MMHG | HEART RATE: 76 BPM | DIASTOLIC BLOOD PRESSURE: 82 MMHG | RESPIRATION RATE: 18 BRPM

## 2020-10-04 DIAGNOSIS — M25.571 ACUTE RIGHT ANKLE PAIN: Primary | ICD-10-CM

## 2020-10-04 PROCEDURE — 73610 X-RAY EXAM OF ANKLE: CPT

## 2020-10-04 PROCEDURE — 99283 EMERGENCY DEPT VISIT LOW MDM: CPT

## 2020-10-04 RX ORDER — HYDROCODONE BITARTRATE AND ACETAMINOPHEN 5; 325 MG/1; MG/1
1 TABLET ORAL ONCE
Status: COMPLETED | OUTPATIENT
Start: 2020-10-04 | End: 2020-10-04

## 2020-10-04 RX ADMIN — HYDROCODONE BITARTRATE AND ACETAMINOPHEN 1 TABLET: 5; 325 TABLET ORAL at 23:47

## 2020-10-05 RX ORDER — CEPHALEXIN 500 MG/1
500 CAPSULE ORAL 3 TIMES DAILY
Qty: 30 CAPSULE | Refills: 0 | Status: SHIPPED | OUTPATIENT
Start: 2020-10-05 | End: 2020-10-15

## 2020-10-05 RX ORDER — CEPHALEXIN 500 MG/1
500 CAPSULE ORAL ONCE
Status: COMPLETED | OUTPATIENT
Start: 2020-10-05 | End: 2020-10-05

## 2020-10-05 RX ADMIN — CEPHALEXIN 500 MG: 500 CAPSULE ORAL at 00:10

## 2020-10-05 NOTE — ED PROVIDER NOTES
Subjective   65-year-old white female presents to the emergency department chief complaint of ankle pain.  Patient complains of right ankle pain that started this morning.  Patient does not recall injury or trauma.  She relates the pain is worse with movement and weightbearing.  She rates the pain as 9 out of 10 severity.  She denies fever sweats or chills.  She relates she feels well otherwise.          Review of Systems   Constitutional: Negative for chills, diaphoresis and fever.   Respiratory: Negative for cough and shortness of breath.    Cardiovascular: Negative for chest pain.   Gastrointestinal: Negative for abdominal pain, nausea and vomiting.   Genitourinary: Negative for dysuria.   Musculoskeletal: Positive for arthralgias and back pain. Negative for neck pain.   Neurological: Positive for numbness. Negative for syncope, weakness and headaches.   All other systems reviewed and are negative.      Past Medical History:   Diagnosis Date   • Cervical spinal stenosis    • Dysuria    • Fibromyalgia    • GERD (gastroesophageal reflux disease)    • Hyperlipidemia    • Hypertension    • Osteoporosis    • Urinary tract infectious disease        Allergies   Allergen Reactions   • Ace Inhibitors Dizziness   • Gabapentin Swelling   • Gemfibrozil Swelling   • Oxycodone Itching   • Doxycycline Rash   • Morphine Sulfate Rash   • Nickel Rash   • Other Rash     Electrode adhesive- burning and rash     • Sulfa Antibiotics Rash   • Tapentadol Rash     Rash       Past Surgical History:   Procedure Laterality Date   • BACK SURGERY     • HYSTERECTOMY     • KNEE ARTHROPLASTY     • SINUS SURGERY         Family History   Problem Relation Age of Onset   • Heart disease Mother    • Hypertension Mother    • Cancer Father    • Heart disease Father    • Hypertension Father        Social History     Socioeconomic History   • Marital status:      Spouse name: Not on file   • Number of children: Not on file   • Years of education:  Not on file   • Highest education level: Not on file   Tobacco Use   • Smoking status: Former Smoker   • Smokeless tobacco: Never Used   Substance and Sexual Activity   • Alcohol use: No   • Drug use: No   • Sexual activity: Defer           Objective   Physical Exam  Vitals signs and nursing note reviewed.   Constitutional:       General: She is not in acute distress.     Appearance: She is not toxic-appearing or diaphoretic.   HENT:      Head: Normocephalic and atraumatic.      Right Ear: External ear normal.      Left Ear: External ear normal.      Nose: Nose normal.      Mouth/Throat:      Mouth: Mucous membranes are moist.      Pharynx: Oropharynx is clear.   Eyes:      Extraocular Movements: Extraocular movements intact.      Conjunctiva/sclera: Conjunctivae normal.      Pupils: Pupils are equal, round, and reactive to light.   Neck:      Musculoskeletal: Normal range of motion and neck supple.   Cardiovascular:      Rate and Rhythm: Normal rate and regular rhythm.      Pulses: Normal pulses.      Heart sounds: Normal heart sounds.   Pulmonary:      Effort: Pulmonary effort is normal.      Breath sounds: Normal breath sounds.   Abdominal:      General: Bowel sounds are normal. There is no distension.      Palpations: Abdomen is soft. There is no mass.      Tenderness: There is no abdominal tenderness. There is no guarding.   Musculoskeletal:      Comments: There is tenderness of the right ankle.  There is no erythema or increased warmth.  No calf tenderness or swelling.  The Achilles tendon is intact.  Neurovascular intact distally.   Skin:     General: Skin is warm and dry.   Neurological:      Mental Status: She is alert and oriented to person, place, and time.      Sensory: No sensory deficit.      Motor: No weakness.   Psychiatric:         Mood and Affect: Mood normal.         Behavior: Behavior normal.         Procedures           ED Course  ED Course as of Oct 05 0004   Mon Oct 05, 2020   0001 Patient is  alert and resting comfortably in no acute distress.  I reviewed the results of her evaluation with her.  She requested I start her on antibiotics in case she is developing cellulitis, although I do not see any sign of cellulitis on exam.  I agreed to start her on Keflex as she requested.  We will provide an ankle splint.  I recommended primary care follow-up.  I advised her to return to the emergency department if she develops a fever or if her symptoms change or worsen.    [DR]      ED Course User Index  [DR] Pelon Gonzalez MD           Labs Reviewed - No data to display  Xr Ankle 3+ View Right    Result Date: 10/4/2020  Narrative: EXAM DESCRIPTION: XR ANKLE 3+ VW RIGHT CLINICAL HISTORY: right ankle pain COMPARISON: None TECHNIQUE: Three views of the right ankle. FINDINGS: There is no acute fracture or dislocation. Bone mineralization is within normal limits. Joint spaces are preserved. There is lateral and anterior soft tissue swelling. There is no radiopaque foreign body material. Incidentally noted moderate heel spur     Impression: Tissue swelling without acute osseous abnormality Electronically signed by:  Ash Hernandez MD  10/4/2020 11:54 PM CDT Workstation: [id number]                                  [facility]    Final diagnoses:   Acute right ankle pain            Pelon Gonzalez MD  10/05/20 0004

## 2021-03-10 ENCOUNTER — IMMUNIZATION (OUTPATIENT)
Dept: VACCINE CLINIC | Facility: HOSPITAL | Age: 66
End: 2021-03-10

## 2021-03-10 PROCEDURE — 0001A: CPT | Performed by: THORACIC SURGERY (CARDIOTHORACIC VASCULAR SURGERY)

## 2021-03-10 PROCEDURE — 91300 HC SARSCOV02 VAC 30MCG/0.3ML IM: CPT | Performed by: THORACIC SURGERY (CARDIOTHORACIC VASCULAR SURGERY)

## 2021-03-31 ENCOUNTER — IMMUNIZATION (OUTPATIENT)
Dept: VACCINE CLINIC | Facility: HOSPITAL | Age: 66
End: 2021-03-31

## 2021-03-31 PROCEDURE — 0002A: CPT | Performed by: THORACIC SURGERY (CARDIOTHORACIC VASCULAR SURGERY)

## 2021-03-31 PROCEDURE — 91300 HC SARSCOV02 VAC 30MCG/0.3ML IM: CPT | Performed by: THORACIC SURGERY (CARDIOTHORACIC VASCULAR SURGERY)

## 2022-05-27 ENCOUNTER — OFFICE VISIT (OUTPATIENT)
Dept: GASTROENTEROLOGY | Facility: CLINIC | Age: 67
End: 2022-05-27

## 2022-05-27 VITALS
HEART RATE: 77 BPM | HEIGHT: 67 IN | SYSTOLIC BLOOD PRESSURE: 138 MMHG | BODY MASS INDEX: 35.19 KG/M2 | DIASTOLIC BLOOD PRESSURE: 76 MMHG | WEIGHT: 224.2 LBS

## 2022-05-27 DIAGNOSIS — R10.84 GENERALIZED ABDOMINAL PAIN: Primary | ICD-10-CM

## 2022-05-27 DIAGNOSIS — K59.09 OTHER CONSTIPATION: ICD-10-CM

## 2022-05-27 DIAGNOSIS — R11.0 NAUSEA: ICD-10-CM

## 2022-05-27 PROCEDURE — 99204 OFFICE O/P NEW MOD 45 MIN: CPT | Performed by: INTERNAL MEDICINE

## 2022-05-27 RX ORDER — POLYETHYLENE GLYCOL 3350 17 G/17G
17 POWDER, FOR SOLUTION ORAL DAILY
Qty: 30 PACKET | Refills: 11 | Status: SHIPPED | OUTPATIENT
Start: 2022-05-27 | End: 2022-06-26

## 2022-05-27 RX ORDER — SODIUM, POTASSIUM,MAG SULFATES 17.5-3.13G
SOLUTION, RECONSTITUTED, ORAL ORAL
Qty: 354 ML | Refills: 0 | Status: SHIPPED | OUTPATIENT
Start: 2022-05-27 | End: 2022-06-20

## 2022-05-27 RX ORDER — MELATONIN
2000 DAILY
COMMUNITY

## 2022-05-27 RX ORDER — DEXTROSE AND SODIUM CHLORIDE 5; .45 G/100ML; G/100ML
30 INJECTION, SOLUTION INTRAVENOUS CONTINUOUS PRN
Status: CANCELLED | OUTPATIENT
Start: 2022-06-09

## 2022-05-27 RX ORDER — FENOFIBRATE 145 MG/1
145 TABLET, COATED ORAL DAILY
COMMUNITY

## 2022-05-27 RX ORDER — OMEPRAZOLE 40 MG/1
40 CAPSULE, DELAYED RELEASE ORAL DAILY
Qty: 30 CAPSULE | Refills: 11 | Status: SHIPPED | OUTPATIENT
Start: 2022-05-27

## 2022-05-27 RX ORDER — SIMVASTATIN 20 MG
20 TABLET ORAL DAILY
COMMUNITY
Start: 2022-03-24

## 2022-05-27 NOTE — PROGRESS NOTES
[facility]      Chief Complaint:   Chief Complaint   Patient presents with   • Colon Cancer Screening     Father had colon cancer and passed from it        Subjective     HPI:   Ms. Joyner is a 67-year-old  female with past medical history of cervical spinal stenosis, fibromyalgia, hyperlipidemia, hypertension, osteoporosis, UTI, GERD presenting for evaluation for abdominal pain.  She has intermittent bouts of generalized abdominal discomfort associated with nausea and constipation alternating with diarrhea for past several months.  Pain is worse with food intake.  She is taking Prilosec 20 mg daily without much help.  Denies rectal bleeding or weight loss.  Also denied NSAID usage.  Her last colonoscopy in 2016 was by Dr. Mcelroy.  She does not recall the details of results.  Her father had colon cancer.  She has GERD for past several years for which she takes PPI with good control of symptoms.    Past Medical History:   Past Medical History:   Diagnosis Date   • Cervical spinal stenosis    • Dysuria    • Fibromyalgia    • GERD (gastroesophageal reflux disease)    • Hyperlipidemia    • Hypertension    • Osteoporosis    • Urinary tract infectious disease        Past Surgical History:  Past Surgical History:   Procedure Laterality Date   • BACK SURGERY     • COLONOSCOPY     • HYSTERECTOMY     • KNEE ARTHROPLASTY     • SINUS SURGERY         Family History:  Family History   Problem Relation Age of Onset   • Heart disease Mother    • Hypertension Mother    • Colon cancer Father    • Cancer Father    • Heart disease Father    • Hypertension Father        Social History:   reports that she has quit smoking. She has never used smokeless tobacco. She reports that she does not drink alcohol and does not use drugs.    Medications:   Prior to Admission medications    Medication Sig Start Date End Date Taking? Authorizing Provider   acetaminophen (TYLENOL) 500 MG tablet Take 1,000 mg by mouth  2 (Two) Times a Day.   Yes Yeimy Bah MD   ascorbic acid (VITAMIN C) 100 MG tablet Take 100 mg by mouth Daily.   Yes Yeimy Bah MD   cholecalciferol (VITAMIN D3) 25 MCG (1000 UT) tablet Take 2,000 Units by mouth Daily.   Yes Yeimy Bah MD   escitalopram (LEXAPRO) 20 MG tablet Take 20 mg by mouth Daily. 8/6/18  Yes Yeimy Bah MD   estradiol (ESTRACE) 0.5 MG tablet Take 0.5 mg by mouth 2 (Two) Times a Week.   Yes Yeimy Bah MD   fenofibrate (TRICOR) 145 MG tablet Take 145 mg by mouth Daily.   Yes Yeimy Bah MD   irbesartan (AVAPRO) 150 MG tablet Take 1 tablet by mouth Daily. 1/11/19  Yes Robbie Boothe MD   metoprolol succinate XL (TOPROL-XL) 100 MG 24 hr tablet Take 1 tablet by mouth every night at bedtime. 1/11/19  Yes Robbie Boothe MD   NIACIN PO Take 500 mg by mouth Daily.   Yes Yeimy Bah MD   simvastatin (ZOCOR) 20 MG tablet Take 20 mg by mouth Daily. 3/24/22  Yes Yeimy Bah MD   temazepam (RESTORIL) 30 MG capsule Take 30 mg by mouth every night at bedtime. 8/7/18  Yes Yeimy Bah MD   traMADol (ULTRAM) 50 MG tablet Take 50 mg by mouth 2 (Two) Times a Day.   Yes Yeimy Bah MD   triamcinolone (KENALOG) 0.5 % cream Apply 1 application topically 3 (Three) Times a Day.   Yes Yeimy Bah MD   omeprazole (priLOSEC) 20 MG capsule Take 20 mg by mouth Daily.  5/27/22 Yes Yeimy Bah MD   CBD (cannabidiol) oral oil Take 12 drops by mouth Daily.    Yeimy Bah MD   omeprazole (priLOSEC) 40 MG capsule Take 1 capsule by mouth Daily. 5/27/22   Miguel Fiore MD   polyethylene glycol (MIRALAX) 17 g packet Take 17 g by mouth Daily for 30 days. 5/27/22 6/26/22  Miguel Fiore MD   sodium-potassium-magnesium sulfates (Suprep Bowel Prep Kit) 17.5-3.13-1.6 GM/177ML solution oral solution Please use the instructions given in office 5/27/22   Miguel Fiore MD  "  pravastatin (PRAVACHOL) 20 MG tablet Take 20 mg by mouth Every Night.  5/27/22  Provider, MD Yeimy       Allergies:  Ace inhibitors, Gabapentin, Gemfibrozil, Oxycodone, Doxycycline, Morphine sulfate, Nickel, Other, Sulfa antibiotics, and Tapentadol    ROS:    Review of Systems   Constitutional: Negative for chills, fatigue, fever and unexpected weight change.   HENT: Negative for congestion, ear discharge, hearing loss, nosebleeds and sore throat.    Eyes: Negative for pain, discharge and redness.   Respiratory: Negative for cough, chest tightness, shortness of breath and wheezing.    Cardiovascular: Negative for chest pain and palpitations.   Gastrointestinal: Positive for abdominal pain, constipation, diarrhea and nausea. Negative for abdominal distention, blood in stool and vomiting.   Endocrine: Negative for cold intolerance, polydipsia, polyphagia and polyuria.   Genitourinary: Negative for dysuria, flank pain, frequency, hematuria and urgency.   Musculoskeletal: Negative for arthralgias, back pain, joint swelling and myalgias.   Skin: Negative for color change, pallor and rash.   Neurological: Negative for tremors, seizures, syncope, weakness and headaches.   Hematological: Negative for adenopathy. Does not bruise/bleed easily.   Psychiatric/Behavioral: Negative for behavioral problems, confusion, dysphoric mood, hallucinations and suicidal ideas. The patient is not nervous/anxious.      Objective     Blood pressure 138/76, pulse 77, height 170.2 cm (67\"), weight 102 kg (224 lb 3.2 oz), last menstrual period 05/01/2004, not currently breastfeeding.    Physical Exam  Constitutional:       Appearance: She is well-developed.   HENT:      Head: Normocephalic and atraumatic.   Eyes:      Conjunctiva/sclera: Conjunctivae normal.      Pupils: Pupils are equal, round, and reactive to light.   Neck:      Thyroid: No thyromegaly.   Cardiovascular:      Rate and Rhythm: Normal rate and regular rhythm.      Heart " sounds: Normal heart sounds. No murmur heard.  Pulmonary:      Effort: Pulmonary effort is normal.      Breath sounds: Normal breath sounds. No wheezing.   Abdominal:      General: Bowel sounds are normal. There is no distension.      Palpations: Abdomen is soft. There is no mass.      Tenderness: There is no abdominal tenderness.      Hernia: No hernia is present.   Genitourinary:     Comments: No lesions noted  Musculoskeletal:         General: No tenderness. Normal range of motion.      Cervical back: Normal range of motion and neck supple.   Lymphadenopathy:      Cervical: No cervical adenopathy.   Skin:     General: Skin is warm and dry.      Findings: No rash.   Neurological:      Mental Status: She is alert and oriented to person, place, and time.      Cranial Nerves: No cranial nerve deficit.   Psychiatric:         Thought Content: Thought content normal.        Extremities: No edema, cyanosis or clubbing.    Assessment & Plan    1.  Abdominal pain associated with nausea rule out peptic ulcer disease, gastritis and pancreaticobiliary pathology.  Increase Prilosec to 40 mg p.o. daily.  Proceed with EGD for further evaluation.  2.  Abdominal pain with the constipation alternating with diarrhea rule out colorectal neoplasia and IBD.  Add MiraLAX and high-fiber diet.  Proceed with colonoscopy for further evaluation.  3.  Family history of colon cancer, patient needs high risk screening for colorectal neoplasia.  Schedule colonoscopy.  4.  GERD, continue PPI and antireflux lifestyle.  Proceed with EGD for Alcantar's screening.  5.  Obesity, recommend exercise and diet control.  Diagnoses and all orders for this visit:    1. Generalized abdominal pain (Primary)  -     Case Request; Standing  -     Case Request    2. Nausea  -     Case Request; Standing  -     Case Request    3. Other constipation  -     Case Request; Standing  -     Case Request    Other orders  -     sodium-potassium-magnesium sulfates (Suprep  Bowel Prep Kit) 17.5-3.13-1.6 GM/177ML solution oral solution; Please use the instructions given in office  Dispense: 354 mL; Refill: 0  -     omeprazole (priLOSEC) 40 MG capsule; Take 1 capsule by mouth Daily.  Dispense: 30 capsule; Refill: 11  -     polyethylene glycol (MIRALAX) 17 g packet; Take 17 g by mouth Daily for 30 days.  Dispense: 30 packet; Refill: 11  -     Follow Anesthesia Guidelines / Standing Orders; Future  -     Obtain Informed Consent; Future        ESOPHAGOGASTRODUODENOSCOPY (N/A), COLONOSCOPY (N/A)     Diagnosis Plan   1. Generalized abdominal pain  Case Request    Case Request   2. Nausea  Case Request    Case Request   3. Other constipation  Case Request    Case Request       Anticipated Surgical Procedure:  Orders Placed This Encounter   Procedures   • Follow Anesthesia Guidelines / Standing Orders     Standing Status:   Future   • Obtain Informed Consent     Standing Status:   Future     Order Specific Question:   Informed Consent Given For     Answer:   egd and colonoscopy       The risks, benefits, and alternatives of this procedure have been discussed with the patient or the responsible party- the patient understands and agrees to proceed.            This document has been electronically signed by Miguel Fiore MD on May 27, 2022 12:21 CDT

## 2022-06-01 ENCOUNTER — PATIENT ROUNDING (BHMG ONLY) (OUTPATIENT)
Dept: GASTROENTEROLOGY | Facility: CLINIC | Age: 67
End: 2022-06-01

## 2022-06-01 NOTE — PROGRESS NOTES
June 1, 2022    Hello, may I speak with Maude Joyner?    My name is Ana Paula       I am  with PRISCA WANG  [facility]  [address] [place] [address] [address] [place] [address] [phone].    Before we get started may I verify your date of birth? 1955    I am calling to officially welcome you to our practice and ask about your recent visit. Is this a good time to talk? YES     Tell me about your visit with us. What things went well?  Patient just has a  Question about EGD wanted to make sure that she would be asleep for this one as well.        We're always looking for ways to make our patients' experiences even better. Do you have recommendations on ways we may improve?   NO    Overall were you satisfied with your first visit to our practice? YES        I appreciate you taking the time to speak with me today. Is there anything else I can do for you? NO      Thank you, and have a great day.

## 2022-06-09 ENCOUNTER — ANESTHESIA EVENT (OUTPATIENT)
Dept: GASTROENTEROLOGY | Facility: HOSPITAL | Age: 67
End: 2022-06-09

## 2022-06-09 ENCOUNTER — HOSPITAL ENCOUNTER (OUTPATIENT)
Facility: HOSPITAL | Age: 67
Setting detail: HOSPITAL OUTPATIENT SURGERY
Discharge: HOME | End: 2022-06-09
Attending: INTERNAL MEDICINE | Admitting: INTERNAL MEDICINE

## 2022-06-09 ENCOUNTER — ANESTHESIA (OUTPATIENT)
Dept: GASTROENTEROLOGY | Facility: HOSPITAL | Age: 67
End: 2022-06-09

## 2022-06-09 VITALS
SYSTOLIC BLOOD PRESSURE: 115 MMHG | BODY MASS INDEX: 33.04 KG/M2 | OXYGEN SATURATION: 94 % | DIASTOLIC BLOOD PRESSURE: 60 MMHG | WEIGHT: 218.03 LBS | TEMPERATURE: 97.3 F | HEART RATE: 71 BPM | RESPIRATION RATE: 18 BRPM | HEIGHT: 68 IN

## 2022-06-09 DIAGNOSIS — K59.09 OTHER CONSTIPATION: ICD-10-CM

## 2022-06-09 DIAGNOSIS — R10.84 GENERALIZED ABDOMINAL PAIN: ICD-10-CM

## 2022-06-09 DIAGNOSIS — R11.0 NAUSEA: ICD-10-CM

## 2022-06-09 PROCEDURE — 25010000002 PROPOFOL 10 MG/ML EMULSION: Performed by: NURSE ANESTHETIST, CERTIFIED REGISTERED

## 2022-06-09 PROCEDURE — 88305 TISSUE EXAM BY PATHOLOGIST: CPT

## 2022-06-09 PROCEDURE — 43239 EGD BIOPSY SINGLE/MULTIPLE: CPT | Performed by: INTERNAL MEDICINE

## 2022-06-09 PROCEDURE — 45380 COLONOSCOPY AND BIOPSY: CPT | Performed by: INTERNAL MEDICINE

## 2022-06-09 PROCEDURE — 45385 COLONOSCOPY W/LESION REMOVAL: CPT | Performed by: INTERNAL MEDICINE

## 2022-06-09 RX ORDER — DEXTROSE AND SODIUM CHLORIDE 5; .45 G/100ML; G/100ML
30 INJECTION, SOLUTION INTRAVENOUS CONTINUOUS PRN
Status: DISCONTINUED | OUTPATIENT
Start: 2022-06-09 | End: 2022-06-09 | Stop reason: HOSPADM

## 2022-06-09 RX ORDER — PROPOFOL 10 MG/ML
VIAL (ML) INTRAVENOUS AS NEEDED
Status: DISCONTINUED | OUTPATIENT
Start: 2022-06-09 | End: 2022-06-09 | Stop reason: SURG

## 2022-06-09 RX ADMIN — DEXTROSE AND SODIUM CHLORIDE 30 ML/HR: 5; 450 INJECTION, SOLUTION INTRAVENOUS at 14:13

## 2022-06-09 RX ADMIN — PROPOFOL 110 MG: 10 INJECTION, EMULSION INTRAVENOUS at 14:43

## 2022-06-09 RX ADMIN — PROPOFOL 40 MG: 10 INJECTION, EMULSION INTRAVENOUS at 14:44

## 2022-06-09 RX ADMIN — PROPOFOL 30 MG: 10 INJECTION, EMULSION INTRAVENOUS at 15:00

## 2022-06-09 RX ADMIN — PROPOFOL 40 MG: 10 INJECTION, EMULSION INTRAVENOUS at 14:46

## 2022-06-09 RX ADMIN — PROPOFOL 40 MG: 10 INJECTION, EMULSION INTRAVENOUS at 14:48

## 2022-06-09 RX ADMIN — PROPOFOL 30 MG: 10 INJECTION, EMULSION INTRAVENOUS at 14:56

## 2022-06-09 RX ADMIN — PROPOFOL 40 MG: 10 INJECTION, EMULSION INTRAVENOUS at 14:50

## 2022-06-09 RX ADMIN — PROPOFOL 30 MG: 10 INJECTION, EMULSION INTRAVENOUS at 14:53

## 2022-06-09 NOTE — ANESTHESIA PREPROCEDURE EVALUATION
Anesthesia Evaluation     NPO Solid Status: > 8 hours  NPO Liquid Status: > 2 hours           Airway   Mallampati: II  TM distance: >3 FB  Neck ROM: full  no difficulty expected  Dental - normal exam     Pulmonary - normal exam   (+) shortness of breath,   Cardiovascular - normal exam    (+) hypertension, hyperlipidemia,       Neuro/Psych  GI/Hepatic/Renal/Endo    (+)  GERD,  renal disease,     Musculoskeletal     Abdominal    Substance History      OB/GYN          Other                        Anesthesia Plan    ASA 3     MAC   total IV anesthesia  intravenous induction     Anesthetic plan, risks, benefits, and alternatives have been provided, discussed and informed consent has been obtained with: patient.        CODE STATUS:

## 2022-06-09 NOTE — INTERVAL H&P NOTE
H&P reviewed. The patient was examined and there are no changes to the H&P.         "Occupational Therapy Treatment completed with focus on ADLs, ADL transfers and patient education.  Functional Status:  Pt seen for OT tx today.  Pt was pleasant and cooperative during the session.  Continues to be limited by endurance, cognition, weakness, L side deficits, and self care.  Upon entrance to the room pt had just finished with PT and requested to eat.  Noticed pt was unable to hold utensils and get food on or get it to his mouth.  Pt completed self feeding with min A unable to open packages.  Was given built up handles which increased I however once pt is fatigued was unable to use enough pressure to get food on fork.  Needing frequent cues to initiate, follow through, and problem solve during ADL tasks. Pt is able to tolerate 30 minutes of activity with frequent therapeutic rests.  Pt would benefit from continued inpt transitional care OT as they are continuing to need assistance with ADLs.   Discussed POC with OTR whom agreed pt would benefit from increase of OT from 3 times a weeks to 5.  Pt is also requesting as he is motivated to work on his deficits in order to go home.      Plan of Care: Will benefit from Occupational Therapy 5 times per week  Discharge Recommendations:  Equipment Will Continue to Assess for Equipment Needs.   See \"Rehab Therapy-Acute\" Patient Summary Report for complete documentation.   "

## 2022-06-09 NOTE — ANESTHESIA POSTPROCEDURE EVALUATION
Patient: Maude Joyner    Procedure Summary     Date: 06/09/22 Room / Location: [facility] ENDOSCOPY 1 / [facility] ENDOSCOPY    Anesthesia Start: 1442 Anesthesia Stop: 1504    Procedures:       ESOPHAGOGASTRODUODENOSCOPY (N/A )      COLONOSCOPY (N/A ) Diagnosis:       Generalized abdominal pain      Nausea      Other constipation      (Generalized abdominal pain [R10.84])      (Nausea [R11.0])      (Other constipation [K59.09])    Surgeons: Miguel Fiore MD Provider: Ash Kraft CRNA    Anesthesia Type: MAC ASA Status: 3          Anesthesia Type: MAC    Vitals  No vitals data found for the desired time range.          Post Anesthesia Care and Evaluation    Patient location during evaluation: bedside  Patient participation: waiting for patient participation  Level of consciousness: responsive to verbal stimuli  Pain management: adequate    Airway patency: patent  Anesthetic complications: No anesthetic complications  PONV Status: none  Cardiovascular status: acceptable  Respiratory status: acceptable  Hydration status: acceptable    Comments: ---------------------------               06/09/22                      1404         ---------------------------   BP:          143/83         Pulse:         71           Resp:          20           Temp:   96.7 °F (35.9 °C)   SpO2:          96%         ---------------------------

## 2022-06-13 LAB — REF LAB TEST METHOD: NORMAL

## 2022-06-20 ENCOUNTER — OFFICE VISIT (OUTPATIENT)
Dept: GASTROENTEROLOGY | Facility: CLINIC | Age: 67
End: 2022-06-20

## 2022-06-20 VITALS
HEIGHT: 68 IN | SYSTOLIC BLOOD PRESSURE: 143 MMHG | DIASTOLIC BLOOD PRESSURE: 84 MMHG | WEIGHT: 216.6 LBS | BODY MASS INDEX: 32.83 KG/M2 | HEART RATE: 58 BPM

## 2022-06-20 DIAGNOSIS — R10.84 GENERALIZED ABDOMINAL PAIN: Primary | ICD-10-CM

## 2022-06-20 PROCEDURE — 99214 OFFICE O/P EST MOD 30 MIN: CPT | Performed by: INTERNAL MEDICINE

## 2022-06-20 RX ORDER — SUCRALFATE 1 G/1
1 TABLET ORAL 4 TIMES DAILY
Qty: 120 TABLET | Refills: 4 | Status: SHIPPED | OUTPATIENT
Start: 2022-06-20 | End: 2022-07-20

## 2022-06-20 NOTE — PROGRESS NOTES
Chief Complaint   Patient presents with   • endo f/u        Subjective    Maude Joyner is a 67 y.o. female.    History of Present Illness  Patient presented to GI clinic for follow-up visit today.  Has intermittent symptoms with upper abdominal pain with food and water intake.  Denied nausea or vomiting.  Bowel movements regular.  Weight is stable.  EGD was consistent with esophagitis and gastritis.  Path was consistent with reactive gastropathy.  Colonoscopy was consistent with diverticulosis, adenomatous colon polyps and hemorrhoids.       The following portions of the patient's history were reviewed and updated as appropriate:   Past Medical History:   Diagnosis Date   • Cervical spinal stenosis    • Dysuria    • Fibromyalgia    • GERD (gastroesophageal reflux disease)    • Hyperlipidemia    • Hypertension    • Osteoporosis    • Urinary tract infectious disease      Past Surgical History:   Procedure Laterality Date   • BACK SURGERY     • COLONOSCOPY     • COLONOSCOPY N/A 6/9/2022    Procedure: COLONOSCOPY;  Surgeon: Miguel Fiore MD;  Location: [facility] ENDOSCOPY;  Service: Gastroenterology;  Laterality: N/A;   • ENDOSCOPY N/A 6/9/2022    Procedure: ESOPHAGOGASTRODUODENOSCOPY;  Surgeon: Miguel Fiore MD;  Location: [facility] ENDOSCOPY;  Service: Gastroenterology;  Laterality: N/A;   • HYSTERECTOMY     • KNEE ARTHROPLASTY     • SINUS SURGERY       Family History   Problem Relation Age of Onset   • Heart disease Mother    • Hypertension Mother    • Colon cancer Father    • Cancer Father    • Heart disease Father    • Hypertension Father      OB History    No obstetric history on file.       Prior to Admission medications    Medication Sig Start Date End Date Taking? Authorizing Provider   acetaminophen (TYLENOL) 500 MG tablet Take 1,000 mg by mouth 2 (Two) Times a Day.    ProviderYeimy MD   ascorbic acid (VITAMIN C) 100 MG tablet Take 100 mg by mouth Daily.    Yeimy Bah MD    cholecalciferol (VITAMIN D3) 25 MCG (1000 UT) tablet Take 2,000 Units by mouth Daily.    Yeimy Bah MD   escitalopram (LEXAPRO) 20 MG tablet Take 20 mg by mouth Daily. 8/6/18   Yeimy Bah MD   fenofibrate (TRICOR) 145 MG tablet Take 145 mg by mouth Daily.    Yeimy Bah MD   irbesartan (AVAPRO) 150 MG tablet Take 1 tablet by mouth Daily. 1/11/19   Robbie Boothe MD   metoprolol succinate XL (TOPROL-XL) 100 MG 24 hr tablet Take 1 tablet by mouth every night at bedtime. 1/11/19   Robbie Boothe MD   NIACIN PO Take 500 mg by mouth Daily.    Yeimy Bah MD   omeprazole (priLOSEC) 40 MG capsule Take 1 capsule by mouth Daily. 5/27/22   Miguel Fiore MD   polyethylene glycol (MIRALAX) 17 g packet Take 17 g by mouth Daily for 30 days. 5/27/22 6/26/22  Miguel Fiore MD   simvastatin (ZOCOR) 20 MG tablet Take 20 mg by mouth Daily. 3/24/22   Yeimy Bah MD   sucralfate (Carafate) 1 g tablet Take 1 tablet by mouth 4 (Four) Times a Day for 30 days. 6/20/22 7/20/22  Miguel Fiore MD   temazepam (RESTORIL) 30 MG capsule Take 30 mg by mouth every night at bedtime. 8/7/18   Yeimy Bah MD   traMADol (ULTRAM) 50 MG tablet Take 50 mg by mouth 2 (Two) Times a Day.    Yeimy Bah MD   triamcinolone (KENALOG) 0.5 % cream Apply 1 application topically 3 (Three) Times a Day.    Yeimy Bah MD   sodium-potassium-magnesium sulfates (Suprep Bowel Prep Kit) 17.5-3.13-1.6 GM/177ML solution oral solution Please use the instructions given in office 5/27/22 6/20/22  Miguel Fiore MD     Allergies   Allergen Reactions   • Ace Inhibitors Dizziness   • Gabapentin Swelling   • Gemfibrozil Swelling   • Oxycodone Itching   • Doxycycline Rash   • Morphine Sulfate Rash   • Nickel Rash   • Other Rash     Electrode adhesive- burning and rash     • Sulfa Antibiotics Rash   • Tapentadol Rash     Rash     Social History     Socioeconomic  "History   • Marital status: Significant Other   Tobacco Use   • Smoking status: Former Smoker   • Smokeless tobacco: Never Used   Vaping Use   • Vaping Use: Never used   Substance and Sexual Activity   • Alcohol use: No   • Drug use: No   • Sexual activity: Defer       Review of Systems  Review of Systems   Constitutional: Negative for chills, fatigue, fever and unexpected weight change.   HENT: Negative for congestion, ear discharge, hearing loss, nosebleeds and sore throat.    Eyes: Negative for pain, discharge and redness.   Respiratory: Negative for cough, chest tightness, shortness of breath and wheezing.    Cardiovascular: Negative for chest pain and palpitations.   Gastrointestinal: Positive for abdominal pain. Negative for abdominal distention, blood in stool, constipation, diarrhea, nausea and vomiting.   Endocrine: Negative for cold intolerance, polydipsia, polyphagia and polyuria.   Genitourinary: Negative for dysuria, flank pain, frequency, hematuria and urgency.   Musculoskeletal: Negative for arthralgias, back pain, joint swelling and myalgias.   Skin: Negative for color change, pallor and rash.   Neurological: Negative for tremors, seizures, syncope, weakness and headaches.   Hematological: Negative for adenopathy. Does not bruise/bleed easily.   Psychiatric/Behavioral: Negative for behavioral problems, confusion, dysphoric mood, hallucinations and suicidal ideas. The patient is not nervous/anxious.         /84 (BP Location: Left arm)   Pulse 58   Ht 172.7 cm (68\")   Wt 98.2 kg (216 lb 9.6 oz)   LMP 05/01/2004 (Approximate)   BMI 32.93 kg/m²     Objective    Physical Exam  Constitutional:       Appearance: She is well-developed.   HENT:      Head: Normocephalic and atraumatic.   Eyes:      Conjunctiva/sclera: Conjunctivae normal.      Pupils: Pupils are equal, round, and reactive to light.   Neck:      Thyroid: No thyromegaly.   Cardiovascular:      Rate and Rhythm: Normal rate and regular " rhythm.      Heart sounds: Normal heart sounds. No murmur heard.  Pulmonary:      Effort: Pulmonary effort is normal.      Breath sounds: Normal breath sounds. No wheezing.   Abdominal:      General: Bowel sounds are normal. There is no distension.      Palpations: Abdomen is soft. There is no mass.      Tenderness: There is no abdominal tenderness.      Hernia: No hernia is present.   Genitourinary:     Comments: No lesions noted  Musculoskeletal:         General: No tenderness. Normal range of motion.      Cervical back: Normal range of motion and neck supple.   Lymphadenopathy:      Cervical: No cervical adenopathy.   Skin:     General: Skin is warm and dry.      Findings: No rash.   Neurological:      Mental Status: She is alert and oriented to person, place, and time.      Cranial Nerves: No cranial nerve deficit.   Psychiatric:         Thought Content: Thought content normal.       Admission on 2022, Discharged on 2022   Component Date Value Ref Range Status   • Reference Lab Report 2022    Final                    Value:[facility]  [address] [place] [address]  Phone: [phone] or [phone]  Fax: [phone]  Pete Gonzalez M.D., Medical Director    PATIENT NAME                           LABORATORY NO.  [address]ON.                      [id number]  [id number]                         AGE              SEX  N           CLIENT REF #  [facility]           67      1955  F    [id number]   [id number]    [place]                       REQUESTING M.D.     ATTENDING M.D.     COPY TO[address]                 EZIO PRITCHARD JAYNA  [address]  DATE COLLECTED      DATE RECEIVED      DATE REPORTED  2022          06/10/2022         2022    DIAGNOSIS:  A.   DUODENUM, BIOPSY:  No significant histologic abnormality  B.   ANTRUM, BIOPSY:  Reactive  "gastropathy  C.   GASTRIC POLYPS:  Fundic gland polyps  D.   GE JUNCTION:  Reactive gastric and squamous mucosa  Negative                           for specialized Alcantar's mucosa  E.   TRANSVERSE COLON POLYP:  Tubular adenoma  F.   SIGMOID COLON POLYP:  Tubular adenoma    JBS/pah    CLINICAL HISTORY:  Generalized abdominal pain  Nausea  Other constipation    SPECIMENS RECEIVED:  A.  DUODENUM, BIOPSY  B.  ANTRUM, BIOPSY  C.  GASTRIC POLYPS  D.  GE JUNCTION  E.  TRANSVERSE COLON POLYP  F.  SIGMOID COLON POLYP    MICROSCOPIC DESCRIPTION:  Tissue blocks are prepared and slides are examined microscopically on all  specimens. See diagnosis for details.    Professional interpretation rendered by Sp De M.D. at [employer], [address] [place] [address].    GROSS DESCRIPTION:  A.  Specimen is received in 1 formalin filled container \"duodenal biopsy\"  consists of a single piece of tan soft tissue measuring 0.3 x 0.2 x 0.2 cm.  Specimen is submitted entirely in 1 cassette.  B.  Specimen is received in 1 formalin filled container \"antrum biopsy\" and  consists of 2 pieces of tan soft tissue measuring 0.3 x 0.2 x                           0.2 cm.  Specimen is submitted entirely in 1 cassette.  C.  Specimen is received in 1 formalin filled container \"gastric polyps\" and  consists of 2 pieces of tan soft tissue measuring 0.4 x 0.2 x 0.1 cm.  Specimen is submitted entirely in 1 cassette.  D.    Specimen is received in 1 formalin filled container \"GE junction\" and  consists of a single piece of tan soft tissue measuring 0.3 x 0.3 x 0.1 cm.  Specimen is submitted entirely in 1 cassette.  E.  Specimen is received in 1 formalin filled container \"transverse polyp\" and  consists of 3 pieces of tan soft tissue measuring 0.5 x 0.3 x 0.2 cm.  Specimen is submitted entirely in 1 cassette.  F.  Specimen is received in 1 formalin filled container \"sigmoid polyp snare\"  and consists of 2 pieces of tan soft tissue " measuring 0.4 x 0.3 x 0.2 cm.  Specimen is submitted entirely in 1 cassette.  MM    REVIEWED, DIAGNOSED AND ELECTRONICALLY  SIGNED BY:    Sp De M.D.  CPT CODES:  88305x6       Assessment & Plan      1. Generalized abdominal pain    1.  Abdominal pain associated with nausea, likely due to reactive gastropathy and need to rule out biliary pathology given the worsening with food intake.  Continue Prilosec po 40 mg p.o. daily and add Carafate 1 g p.o. 4 times daily.  Obtain right upper quadrant sonogram for further evaluation.  2.  Abdominal pain with the constipation alternating with diarrhea, well controlled.  Add Metamucil and continue MiraLAX and high-fiber diet.   3.  Colon polyps and Family history of colon cancer, repeat colonoscopy in 3 years.  4.  GERD, well controlled.  Continue PPI and antireflux lifestyle.    5.  Diverticulosis, add Metamucil 1 tablespoonful twice daily.  6. Obesity, recommend exercise and diet control.      Orders placed during this encounter include:  Orders Placed This Encounter   Procedures   • US Gallbladder     Standing Status:   Future     Standing Expiration Date:   6/20/2023     Order Specific Question:   Reason for Exam:     Answer:   epigastric pain       * Surgery not found *    Review and/or summary of lab tests, radiology, procedures, medications. Review and summary of old records and obtaining of history. The risks and benefits of my recommendations, as well as other treatment options were discussed with the patient today. Questions were answered.    New Medications Ordered This Visit   Medications   • sucralfate (Carafate) 1 g tablet     Sig: Take 1 tablet by mouth 4 (Four) Times a Day for 30 days.     Dispense:  120 tablet     Refill:  4       Follow-up: Return in about 1 month (around 7/20/2022).               Results for orders placed or performed during the hospital encounter of 06/09/22   TISSUE EXAM, [facility] ([facility])    Specimen: A: Small Intestine,  Duodenum; Tissue    B: Gastric, Antrum; Tissue    C: Gastric, Body; Tissue    D: Esophagus; Tissue    E: Large Intestine, Transverse Colon; Tissue    F: Large Intestine, Sigmoid Colon; Tissue   Result Value Ref Range    Reference Lab Report       [facility]  [address] [place] [address]  Phone: [phone] or [phone]  Fax: [phone]  Pete Gonzalez M.D., Medical Director    PATIENT NAME                           LABORATORY NO.  LÓPEZ GRAVES.                      [id number]  [id number]                         AGE              SEX  SSN           CLIENT REF #  [facility]           67      1955  F    [id number]   [id number]    [place]                       REQUESTING M.D.     ATTENDING M.D.     COPY TO.  [address] [place] [address]                 EZIO PRITCHARD JAYNA  [address]  DATE COLLECTED      DATE RECEIVED      DATE REPORTED  2022          06/10/2022         2022    DIAGNOSIS:  A.   DUODENUM, BIOPSY:  No significant histologic abnormality  B.   ANTRUM, BIOPSY:  Reactive gastropathy  C.   GASTRIC POLYPS:  Fundic gland polyps  D.   GE JUNCTION:  Reactive gastric and squamous mucosa  Negative  for specialized Alcantar's mucosa  E.   TRANSVERSE COLON POLYP:  Tubular adenoma  F.   SIGMOID COLON POLYP:  Tubular adenoma    JBS/pah    CLINICAL HISTORY:  Generalized abdominal pain  Nausea  Other constipation    SPECIMENS RECEIVED:  A.  DUODENUM, BIOPSY  B.  ANTRUM, BIOPSY  C.  GASTRIC POLYPS  D.  GE JUNCTION  E.  TRANSVERSE COLON POLYP  F.  SIGMOID COLON POLYP    MICROSCOPIC DESCRIPTION:  Tissue blocks are prepared and slides are examined microscopically on all  specimens. See diagnosis for details.    Professional interpretation rendered by Sp De M.D. at [employer],  [employer], [address] [place] [address].    GROSS DESCRIPTION:  A.  Specimen is received in 1 formalin  "filled container \"duodenal biopsy\"  consists of a single piece of tan soft tissue measuring 0.3 x 0.2 x 0.2 cm.  Specimen is submitted entirely in 1 cassette.  B.  Specimen is received in 1 formalin filled container \"antrum biopsy\" and  consists of 2 pieces of tan soft tissue measuring 0.3 x 0.2 x  0.2 cm.  Specimen is submitted entirely in 1 cassette.  C.  Specimen is received in 1 formalin filled container \"gastric polyps\" and  consists of 2 pieces of tan soft tissue measuring 0.4 x 0.2 x 0.1 cm.  Specimen is submitted entirely in 1 cassette.  D.    Specimen is received in 1 formalin filled container \"GE junction\" and  consists of a single piece of tan soft tissue measuring 0.3 x 0.3 x 0.1 cm.  Specimen is submitted entirely in 1 cassette.  E.  Specimen is received in 1 formalin filled container \"transverse polyp\" and  consists of 3 pieces of tan soft tissue measuring 0.5 x 0.3 x 0.2 cm.  Specimen is submitted entirely in 1 cassette.  F.  Specimen is received in 1 formalin filled container \"sigmoid polyp snare\"  and consists of 2 pieces of tan soft tissue measuring 0.4 x 0.3 x 0.2 cm.  Specimen is submitted entirely in 1 cassette.  MM    REVIEWED, DIAGNOSED AND ELECTRONICALLY  SIGNED BY:    Sp De M.D.  CPT CODES:  88305x6     Results for orders placed or performed during the hospital encounter of 06/04/18   Tilt Table   Result Value Ref Range    Target HR (85%) 132 bpm    Max. Pred. HR (100%) 155 bpm   Results for orders placed or performed during the hospital encounter of 05/24/18   Gold Top - SST   Result Value Ref Range    Extra Tube Hold for add-ons.    Green Top (Gel)   Result Value Ref Range    Extra Tube Hold for add-ons.    CK-MB    Specimen: Blood   Result Value Ref Range    CKMB 1.78 0.00 - 5.00 ng/mL   CBC Auto Differential    Specimen: Blood   Result Value Ref Range    WBC 7.78 3.20 - 9.80 10*3/mm3    RBC 4.59 3.77 - 5.16 10*6/mm3    Hemoglobin 13.7 12.0 - 15.5 g/dL    Hematocrit 40.0 35.0 " - 45.0 %    MCV 87.1 80.0 - 98.0 fL    MCH 29.8 26.5 - 34.0 pg    MCHC 34.3 31.4 - 36.0 g/dL    RDW 13.3 11.5 - 14.5 %    RDW-SD 42.4 36.4 - 46.3 fl    MPV 10.5 8.0 - 12.0 fL    Platelets 301 150 - 450 10*3/mm3    Neutrophil % 56.7 37.0 - 80.0 %    Lymphocyte % 28.4 10.0 - 50.0 %    Monocyte % 11.1 0.0 - 12.0 %    Eosinophil % 2.1 0.0 - 7.0 %    Basophil % 0.8 0.0 - 2.0 %    Immature Grans % 0.9 (H) 0.0 - 0.5 %    Neutrophils, Absolute 4.42 2.00 - 8.60 10*3/mm3    Lymphocytes, Absolute 2.21 0.60 - 4.20 10*3/mm3    Monocytes, Absolute 0.86 0.00 - 0.90 10*3/mm3    Eosinophils, Absolute 0.16 0.00 - 0.70 10*3/mm3    Basophils, Absolute 0.06 0.00 - 0.20 10*3/mm3    Immature Grans, Absolute 0.07 (H) 0.00 - 0.02 10*3/mm3   Lavender Top   Result Value Ref Range    Extra Tube hold for add-on    Lavender Top   Result Value Ref Range    Extra Tube hold for add-on    Light Blue Top   Result Value Ref Range    Extra Tube hold for add-on    Troponin    Specimen: Blood   Result Value Ref Range    Troponin I <0.012 <=0.034 ng/mL   Troponin    Specimen: Arm, Left; Blood   Result Value Ref Range    Troponin I <0.012 <=0.034 ng/mL   Troponin    Specimen: Blood   Result Value Ref Range    Troponin I <0.012 <=0.034 ng/mL   aPTT    Specimen: Blood   Result Value Ref Range    PTT 28.0 20.0 - 40.3 seconds   Protime-INR    Specimen: Blood   Result Value Ref Range    Protime 13.2 11.1 - 15.3 Seconds    INR 1.02 0.80 - 1.20   D-dimer, Quantitative    Specimen: Blood   Result Value Ref Range    D-Dimer, Quantitative 369 0 - 470 ng/mL (FEU)   CBC (No Diff)    Specimen: Blood   Result Value Ref Range    WBC 5.64 3.20 - 9.80 10*3/mm3    RBC 4.17 3.77 - 5.16 10*6/mm3    Hemoglobin 12.3 12.0 - 15.5 g/dL    Hematocrit 36.3 35.0 - 45.0 %    MCV 87.1 80.0 - 98.0 fL    MCH 29.5 26.5 - 34.0 pg    MCHC 33.9 31.4 - 36.0 g/dL    RDW 13.1 11.5 - 14.5 %    RDW-SD 41.9 36.4 - 46.3 fl    MPV 10.2 8.0 - 12.0 fL    Platelets 227 150 - 450 10*3/mm3   BNP     Specimen: Blood   Result Value Ref Range    proBNP 138.0 0.0 - 900.0 pg/mL   CK    Specimen: Blood   Result Value Ref Range    Creatine Kinase 106 30 - 135 U/L   Comprehensive Metabolic Panel    Specimen: Blood   Result Value Ref Range    Glucose 93 60 - 100 mg/dL    BUN 39 (H) 7 - 21 mg/dL    Creatinine 1.23 (H) 0.50 - 1.00 mg/dL    Sodium 138 137 - 145 mmol/L    Potassium 4.1 3.5 - 5.1 mmol/L    Chloride 103 95 - 110 mmol/L    CO2 24.0 22.0 - 31.0 mmol/L    Calcium 9.5 8.4 - 10.2 mg/dL    Total Protein 7.5 6.3 - 8.6 g/dL    Albumin 4.10 3.40 - 4.80 g/dL    ALT (SGPT) 36 9 - 52 U/L    AST (SGOT) 35 14 - 36 U/L    Alkaline Phosphatase 69 38 - 126 U/L    Total Bilirubin 0.4 0.2 - 1.3 mg/dL    eGFR Non  Amer 44 (L) 45 - 104 mL/min/1.73    Globulin 3.4 2.3 - 3.5 gm/dL    A/G Ratio 1.2 1.1 - 1.8 g/dL    BUN/Creatinine Ratio 31.7 (H) 7.0 - 25.0    Anion Gap 11.0 5.0 - 15.0 mmol/L   Basic Metabolic Panel    Specimen: Blood   Result Value Ref Range    Glucose 85 60 - 100 mg/dL    BUN 25 (H) 7 - 21 mg/dL    Creatinine 1.05 (H) 0.50 - 1.00 mg/dL    Sodium 139 137 - 145 mmol/L    Potassium 4.6 3.5 - 5.1 mmol/L    Chloride 105 95 - 110 mmol/L    CO2 27.0 22.0 - 31.0 mmol/L    Calcium 8.8 8.4 - 10.2 mg/dL    eGFR Non  Amer 53 45 - 104 mL/min/1.73    BUN/Creatinine Ratio 23.8 7.0 - 25.0    Anion Gap 7.0 5.0 - 15.0 mmol/L   Basic Metabolic Panel    Specimen: Blood   Result Value Ref Range    Glucose 87 60 - 100 mg/dL    BUN 32 (H) 7 - 21 mg/dL    Creatinine 1.11 (H) 0.50 - 1.00 mg/dL    Sodium 138 137 - 145 mmol/L    Potassium 4.2 3.5 - 5.1 mmol/L    Chloride 105 95 - 110 mmol/L    CO2 27.0 22.0 - 31.0 mmol/L    Calcium 8.6 8.4 - 10.2 mg/dL    eGFR Non  Amer 49 45 - 104 mL/min/1.73    BUN/Creatinine Ratio 28.8 (H) 7.0 - 25.0    Anion Gap 6.0 5.0 - 15.0 mmol/L   Results for orders placed or performed in visit on 12/14/17   Comprehensive Metabolic Panel    Specimen: Blood   Result Value Ref Range    Glucose 95  60 - 100 mg/dL    BUN 24 (H) 7 - 21 mg/dL    Creatinine 1.19 (H) 0.50 - 1.00 mg/dL    Sodium 138 137 - 145 mmol/L    Potassium 5.0 3.5 - 5.1 mmol/L    Chloride 99 95 - 110 mmol/L    CO2 27.0 22.0 - 31.0 mmol/L    Calcium 9.9 8.4 - 10.2 mg/dL    Total Protein 8.1 6.3 - 8.6 g/dL    Albumin 4.40 3.40 - 4.80 g/dL    ALT (SGPT) 31 9 - 52 U/L    AST (SGOT) 31 14 - 36 U/L    Alkaline Phosphatase 80 38 - 126 U/L    Total Bilirubin 0.6 0.2 - 1.3 mg/dL    eGFR Non  Amer 46 45 - 104 mL/min/1.73    Globulin 3.7 (H) 2.3 - 3.5 gm/dL    A/G Ratio 1.2 1.1 - 1.8 g/dL    BUN/Creatinine Ratio 20.2 7.0 - 25.0    Anion Gap 12.0 5.0 - 15.0 mmol/L     *Note: Due to a large number of results and/or encounters for the requested time period, some results have not been displayed. A complete set of results can be found in Results Review.         This document has been electronically signed by Miguel Fiore MD on June 20, 2022 12:54 CDT

## 2022-06-27 ENCOUNTER — HOSPITAL ENCOUNTER (OUTPATIENT)
Dept: ULTRASOUND IMAGING | Facility: HOSPITAL | Age: 67
Discharge: HOME | End: 2022-06-27
Admitting: INTERNAL MEDICINE

## 2022-06-27 DIAGNOSIS — R10.84 GENERALIZED ABDOMINAL PAIN: ICD-10-CM

## 2022-06-27 PROCEDURE — 76705 ECHO EXAM OF ABDOMEN: CPT

## 2022-07-11 ENCOUNTER — OFFICE VISIT (OUTPATIENT)
Dept: GASTROENTEROLOGY | Facility: CLINIC | Age: 67
End: 2022-07-11

## 2022-07-11 ENCOUNTER — LAB (OUTPATIENT)
Dept: LAB | Facility: HOSPITAL | Age: 67
End: 2022-07-11

## 2022-07-11 VITALS
HEIGHT: 68 IN | SYSTOLIC BLOOD PRESSURE: 129 MMHG | WEIGHT: 213.6 LBS | BODY MASS INDEX: 32.37 KG/M2 | DIASTOLIC BLOOD PRESSURE: 82 MMHG | HEART RATE: 60 BPM

## 2022-07-11 DIAGNOSIS — R94.5 ABNORMAL RESULTS OF LIVER FUNCTION STUDIES: ICD-10-CM

## 2022-07-11 DIAGNOSIS — K76.0 FATTY LIVER: Primary | ICD-10-CM

## 2022-07-11 LAB
ALBUMIN SERPL-MCNC: 4.7 G/DL (ref 3.5–5.2)
ALBUMIN/GLOB SERPL: 1.8 G/DL
ALP SERPL-CCNC: 69 U/L (ref 39–117)
ALT SERPL W P-5'-P-CCNC: 25 U/L (ref 1–33)
ANION GAP SERPL CALCULATED.3IONS-SCNC: 10 MMOL/L (ref 5–15)
AST SERPL-CCNC: 33 U/L (ref 1–32)
BILIRUB SERPL-MCNC: 0.6 MG/DL (ref 0–1.2)
BUN SERPL-MCNC: 27 MG/DL (ref 8–23)
BUN/CREAT SERPL: 19 (ref 7–25)
CALCIUM SPEC-SCNC: 9.9 MG/DL (ref 8.6–10.5)
CHLORIDE SERPL-SCNC: 104 MMOL/L (ref 98–107)
CO2 SERPL-SCNC: 26 MMOL/L (ref 22–29)
CREAT SERPL-MCNC: 1.42 MG/DL (ref 0.57–1)
EGFRCR SERPLBLD CKD-EPI 2021: 40.1 ML/MIN/1.73
GLOBULIN UR ELPH-MCNC: 2.6 GM/DL
GLUCOSE SERPL-MCNC: 100 MG/DL (ref 65–99)
HAV IGM SERPL QL IA: NORMAL
HBV CORE IGM SERPL QL IA: NORMAL
HBV SURFACE AG SERPL QL IA: NORMAL
HCV AB SER DONR QL: NORMAL
POTASSIUM SERPL-SCNC: 4.5 MMOL/L (ref 3.5–5.2)
PROT SERPL-MCNC: 7.3 G/DL (ref 6–8.5)
SODIUM SERPL-SCNC: 140 MMOL/L (ref 136–145)

## 2022-07-11 PROCEDURE — 99214 OFFICE O/P EST MOD 30 MIN: CPT | Performed by: INTERNAL MEDICINE

## 2022-07-11 PROCEDURE — 36415 COLL VENOUS BLD VENIPUNCTURE: CPT | Performed by: INTERNAL MEDICINE

## 2022-07-11 PROCEDURE — 80074 ACUTE HEPATITIS PANEL: CPT | Performed by: INTERNAL MEDICINE

## 2022-07-11 PROCEDURE — 80053 COMPREHEN METABOLIC PANEL: CPT | Performed by: INTERNAL MEDICINE

## 2022-07-11 RX ORDER — TIZANIDINE 4 MG/1
TABLET ORAL
COMMUNITY
Start: 2022-06-23 | End: 2022-09-13

## 2022-08-12 ENCOUNTER — OFFICE VISIT (OUTPATIENT)
Dept: GASTROENTEROLOGY | Facility: CLINIC | Age: 67
End: 2022-08-12

## 2022-08-12 VITALS
HEART RATE: 68 BPM | SYSTOLIC BLOOD PRESSURE: 128 MMHG | DIASTOLIC BLOOD PRESSURE: 77 MMHG | BODY MASS INDEX: 32.21 KG/M2 | WEIGHT: 205.2 LBS | HEIGHT: 67 IN

## 2022-08-12 DIAGNOSIS — R10.84 GENERALIZED ABDOMINAL PAIN: Primary | ICD-10-CM

## 2022-08-12 PROCEDURE — 99214 OFFICE O/P EST MOD 30 MIN: CPT | Performed by: INTERNAL MEDICINE

## 2022-08-16 ENCOUNTER — TRANSCRIBE ORDERS (OUTPATIENT)
Dept: CT IMAGING | Facility: HOSPITAL | Age: 67
End: 2022-08-16

## 2022-08-16 DIAGNOSIS — R10.84 ABDOMINAL PAIN, GENERALIZED: Primary | ICD-10-CM

## 2022-08-23 ENCOUNTER — LAB (OUTPATIENT)
Dept: LAB | Facility: HOSPITAL | Age: 67
End: 2022-08-23

## 2022-08-23 ENCOUNTER — HOSPITAL ENCOUNTER (OUTPATIENT)
Dept: CT IMAGING | Facility: HOSPITAL | Age: 67
Discharge: HOME | End: 2022-08-23

## 2022-08-23 DIAGNOSIS — R10.84 GENERALIZED ABDOMINAL PAIN: ICD-10-CM

## 2022-08-23 DIAGNOSIS — R10.84 ABDOMINAL PAIN, GENERALIZED: ICD-10-CM

## 2022-08-23 LAB
BUN SERPL-MCNC: 33 MG/DL (ref 8–23)
CREAT SERPL-MCNC: 1.22 MG/DL (ref 0.57–1)
EGFRCR SERPLBLD CKD-EPI 2021: 48.1 ML/MIN/1.73

## 2022-08-23 PROCEDURE — 0 DIATRIZOATE MEGLUMINE & SODIUM PER 1 ML: Performed by: INTERNAL MEDICINE

## 2022-08-23 PROCEDURE — 36415 COLL VENOUS BLD VENIPUNCTURE: CPT

## 2022-08-23 PROCEDURE — 82565 ASSAY OF CREATININE: CPT

## 2022-08-23 PROCEDURE — 74177 CT ABD & PELVIS W/CONTRAST: CPT

## 2022-08-23 PROCEDURE — 84520 ASSAY OF UREA NITROGEN: CPT

## 2022-08-23 PROCEDURE — 25010000002 IOPAMIDOL 61 % SOLUTION: Performed by: INTERNAL MEDICINE

## 2022-08-23 RX ADMIN — DIATRIZOATE MEGLUMINE AND DIATRIZOATE SODIUM 30 ML: 660; 100 LIQUID ORAL; RECTAL at 12:25

## 2022-08-23 RX ADMIN — IOPAMIDOL 90 ML: 612 INJECTION, SOLUTION INTRAVENOUS at 12:25

## 2022-08-23 NOTE — PROGRESS NOTES
Chief Complaint   Patient presents with   • 1 Month Clinical Appointment     Fatty Liver    Abnormal Liver Function       Subjective    Maude Joyner is a 67 y.o. female.    History of Present Illness  Patient presented to GI clinic for follow-up visit today.  He has intermittent symptoms with constipation and abdominal pain.  Also has continued weight loss, lost 8 pounds weight since last visit.  Denies nausea or vomiting.  Right upper quadrant sonogram was consistent with fatty liver disease.       The following portions of the patient's history were reviewed and updated as appropriate:   Past Medical History:   Diagnosis Date   • Cervical spinal stenosis    • Dysuria    • Fibromyalgia    • GERD (gastroesophageal reflux disease)    • Hyperlipidemia    • Hypertension    • Osteoporosis    • Urinary tract infectious disease      Past Surgical History:   Procedure Laterality Date   • BACK SURGERY     • COLONOSCOPY     • COLONOSCOPY N/A 6/9/2022    Procedure: COLONOSCOPY;  Surgeon: Miguel Fiore MD;  Location: [facility] ENDOSCOPY;  Service: Gastroenterology;  Laterality: N/A;   • ENDOSCOPY N/A 6/9/2022    Procedure: ESOPHAGOGASTRODUODENOSCOPY;  Surgeon: Miguel Fiore MD;  Location: [facility] ENDOSCOPY;  Service: Gastroenterology;  Laterality: N/A;   • HYSTERECTOMY     • KNEE ARTHROPLASTY     • SINUS SURGERY       Family History   Problem Relation Age of Onset   • Heart disease Mother    • Hypertension Mother    • Colon cancer Father    • Cancer Father    • Heart disease Father    • Hypertension Father      OB History    No obstetric history on file.       Prior to Admission medications    Medication Sig Start Date End Date Taking? Authorizing Provider   acetaminophen (TYLENOL) 500 MG tablet Take 1,000 mg by mouth 2 (Two) Times a Day.   Yes Yeimy Bah MD   ascorbic acid (VITAMIN C) 100 MG tablet Take 100 mg by mouth Daily.   Yes Yeimy Bah MD   cholecalciferol (VITAMIN D3) 25 MCG (1000 UT)  tablet Take 2,000 Units by mouth Daily.   Yes Yeimy Bah MD   escitalopram (LEXAPRO) 20 MG tablet Take 20 mg by mouth Daily. 8/6/18  Yes Yeimy Bah MD   fenofibrate (TRICOR) 145 MG tablet Take 145 mg by mouth Daily.   Yes Yeimy Bah MD   irbesartan (AVAPRO) 150 MG tablet Take 1 tablet by mouth Daily. 1/11/19  Yes Robbie Boothe MD   metoprolol succinate XL (TOPROL-XL) 100 MG 24 hr tablet Take 1 tablet by mouth every night at bedtime.  Patient taking differently: 1/2 tablet by mouth in A.M. and 1/2 tablet by mouth in P.M. 1/11/19  Yes Robbie Boothe MD   NIACIN PO Take 500 mg by mouth Daily.   Yes Yeimy Bah MD   omeprazole (priLOSEC) 40 MG capsule Take 1 capsule by mouth Daily. 5/27/22  Yes Miguel Fiore MD   simvastatin (ZOCOR) 20 MG tablet Take 20 mg by mouth Daily. 3/24/22  Yes Yeimy Bah MD   temazepam (RESTORIL) 30 MG capsule Take 30 mg by mouth every night at bedtime. 8/7/18  Yes Yeimy Bah MD   tiZANidine (ZANAFLEX) 4 MG tablet TAKE 1 CAPSULE BY MOUTH AT BEDTIME AS NEEDED FOR MUSCLE SPASMS 6/23/22  Yes Yeimy Bah MD   traMADol (ULTRAM) 50 MG tablet Take 50 mg by mouth 2 (Two) Times a Day.   Yes Yeimy Bah MD   triamcinolone (KENALOG) 0.5 % cream Apply 1 application topically 3 (Three) Times a Day.   Yes Yeimy Bah MD   linaclotide (LINZESS) 290 MCG capsule capsule Take 1 capsule by mouth Every Morning Before Breakfast. 8/12/22   Miugel Fiore MD     Allergies   Allergen Reactions   • Ace Inhibitors Dizziness   • Carafate [Sucralfate] Other (See Comments)     Abdominal Pain   • Gabapentin Swelling   • Gemfibrozil Swelling   • Oxycodone Itching   • Doxycycline Rash   • Morphine Sulfate Rash   • Nickel Rash   • Other Rash     Electrode adhesive- burning and rash     • Sulfa Antibiotics Rash   • Tapentadol Rash     Rash     Social History     Socioeconomic History   • Marital status:   "  Tobacco Use   • Smoking status: Former Smoker   • Smokeless tobacco: Never Used   Vaping Use   • Vaping Use: Never used   Substance and Sexual Activity   • Alcohol use: No   • Drug use: No   • Sexual activity: Defer       Review of Systems  Review of Systems   Constitutional: Positive for unexpected weight change. Negative for chills, fatigue and fever.   HENT: Negative for congestion, ear discharge, hearing loss, nosebleeds and sore throat.    Eyes: Negative for pain, discharge and redness.   Respiratory: Negative for cough, chest tightness, shortness of breath and wheezing.    Cardiovascular: Negative for chest pain and palpitations.   Gastrointestinal: Positive for abdominal pain and constipation. Negative for abdominal distention, blood in stool, diarrhea, nausea and vomiting.   Endocrine: Negative for cold intolerance, polydipsia, polyphagia and polyuria.   Genitourinary: Negative for dysuria, flank pain, frequency, hematuria and urgency.   Musculoskeletal: Negative for arthralgias, back pain, joint swelling and myalgias.   Skin: Negative for color change, pallor and rash.   Neurological: Negative for tremors, seizures, syncope, weakness and headaches.   Hematological: Negative for adenopathy. Does not bruise/bleed easily.   Psychiatric/Behavioral: Negative for behavioral problems, confusion, dysphoric mood, hallucinations and suicidal ideas. The patient is not nervous/anxious.         /77   Pulse 68   Ht 170.2 cm (67\")   Wt 93.1 kg (205 lb 3.2 oz)   LMP 05/01/2004 (Approximate)   BMI 32.14 kg/m²     Objective    Physical Exam  Constitutional:       Appearance: She is well-developed.   HENT:      Head: Normocephalic and atraumatic.   Eyes:      Conjunctiva/sclera: Conjunctivae normal.      Pupils: Pupils are equal, round, and reactive to light.   Neck:      Thyroid: No thyromegaly.   Cardiovascular:      Rate and Rhythm: Normal rate and regular rhythm.      Heart sounds: Normal heart sounds. No " murmur heard.  Pulmonary:      Effort: Pulmonary effort is normal.      Breath sounds: Normal breath sounds. No wheezing.   Abdominal:      General: Bowel sounds are normal. There is no distension.      Palpations: Abdomen is soft. There is no mass.      Tenderness: There is no abdominal tenderness.      Hernia: No hernia is present.   Genitourinary:     Comments: No lesions noted  Musculoskeletal:         General: No tenderness. Normal range of motion.      Cervical back: Normal range of motion and neck supple.   Lymphadenopathy:      Cervical: No cervical adenopathy.   Skin:     General: Skin is warm and dry.      Findings: No rash.   Neurological:      Mental Status: She is alert and oriented to person, place, and time.      Cranial Nerves: No cranial nerve deficit.   Psychiatric:         Thought Content: Thought content normal.       Office Visit on 07/11/2022   Component Date Value Ref Range Status   • Glucose 07/11/2022 100 (A) 65 - 99 mg/dL Final   • BUN 07/11/2022 27 (A) 8 - 23 mg/dL Final   • Creatinine 07/11/2022 1.42 (A) 0.57 - 1.00 mg/dL Final   • Sodium 07/11/2022 140  136 - 145 mmol/L Final   • Potassium 07/11/2022 4.5  3.5 - 5.2 mmol/L Final   • Chloride 07/11/2022 104  98 - 107 mmol/L Final   • CO2 07/11/2022 26.0  22.0 - 29.0 mmol/L Final   • Calcium 07/11/2022 9.9  8.6 - 10.5 mg/dL Final   • Total Protein 07/11/2022 7.3  6.0 - 8.5 g/dL Final   • Albumin 07/11/2022 4.70  3.50 - 5.20 g/dL Final   • ALT (SGPT) 07/11/2022 25  1 - 33 U/L Final   • AST (SGOT) 07/11/2022 33 (A) 1 - 32 U/L Final   • Alkaline Phosphatase 07/11/2022 69  39 - 117 U/L Final   • Total Bilirubin 07/11/2022 0.6  0.0 - 1.2 mg/dL Final   • Globulin 07/11/2022 2.6  gm/dL Final   • A/G Ratio 07/11/2022 1.8  g/dL Final   • BUN/Creatinine Ratio 07/11/2022 19.0  7.0 - 25.0 Final   • Anion Gap 07/11/2022 10.0  5.0 - 15.0 mmol/L Final   • eGFR 07/11/2022 40.1 (A) >60.0 mL/min/1.73 Final    National Kidney Foundation and American Society  of Nephrology (ASN) Task Force recommended calculation based on the Chronic Kidney Disease Epidemiology Collaboration (CKD-EPI) equation refit without adjustment for race.   • Hepatitis B Surface Ag 07/11/2022 Non-Reactive  Non-Reactive Final   • Hep A IgM 07/11/2022 Non-Reactive  Non-Reactive Final   • Hep B C IgM 07/11/2022 Non-Reactive  Non-Reactive Final   • Hepatitis C Ab 07/11/2022 Non-Reactive  Non-Reactive Final     Assessment & Plan      1. Generalized abdominal pain     1.  Abdominal pain associated with nausea, likely due to reactive gastropathy and need to rule out pancreatic pathology given the worsening with food intake.  Continue Prilosec po 40 mg p.o. daily and Carafate 1 g p.o. 4 times daily.  Add low-fat diet.  Obtain CT abdomen pelvis.  2.  Abdominal pain with the constipation, likely due to IBS.  Continue Metamucil, MiraLAX and high-fiber diet.  Add Linzess.  3.  Colon polyps and Family history of colon cancer, repeat colonoscopy in 3 years.  4.  GERD, well controlled.  Continue PPI and antireflux lifestyle.    5.  Diverticulosis, continue Metamucil 1 tablespoonful twice daily.  6.  Colon polyps, repeat colonoscopy in 3 years.  7.  Obesity, recommend exercise and diet control.  8.  Fatty liver disease, continue exercise and diet control.        Orders placed during this encounter include:  Orders Placed This Encounter   Procedures   • CT Abdomen Pelvis With Contrast     Standing Status:   Future     Standing Expiration Date:   8/12/2023     Order Specific Question:   Will Oral Contrast be needed for this procedure?     Answer:   Yes     Order Specific Question:   Release to patient     Answer:   Immediate       * Surgery not found *    Review and/or summary of lab tests, radiology, procedures, medications. Review and summary of old records and obtaining of history. The risks and benefits of my recommendations, as well as other treatment options were discussed with the patient today. Questions  were answered.    New Medications Ordered This Visit   Medications   • linaclotide (LINZESS) 290 MCG capsule capsule     Sig: Take 1 capsule by mouth Every Morning Before Breakfast.     Dispense:  30 capsule     Refill:  5       Follow-up: Return in about 1 month (around 9/12/2022).               Results for orders placed or performed in visit on 07/11/22   Hepatitis Panel, Acute    Specimen: Blood   Result Value Ref Range    Hepatitis B Surface Ag Non-Reactive Non-Reactive    Hep A IgM Non-Reactive Non-Reactive    Hep B C IgM Non-Reactive Non-Reactive    Hepatitis C Ab Non-Reactive Non-Reactive   Comprehensive Metabolic Panel    Specimen: Blood   Result Value Ref Range    Glucose 100 (H) 65 - 99 mg/dL    BUN 27 (H) 8 - 23 mg/dL    Creatinine 1.42 (H) 0.57 - 1.00 mg/dL    Sodium 140 136 - 145 mmol/L    Potassium 4.5 3.5 - 5.2 mmol/L    Chloride 104 98 - 107 mmol/L    CO2 26.0 22.0 - 29.0 mmol/L    Calcium 9.9 8.6 - 10.5 mg/dL    Total Protein 7.3 6.0 - 8.5 g/dL    Albumin 4.70 3.50 - 5.20 g/dL    ALT (SGPT) 25 1 - 33 U/L    AST (SGOT) 33 (H) 1 - 32 U/L    Alkaline Phosphatase 69 39 - 117 U/L    Total Bilirubin 0.6 0.0 - 1.2 mg/dL    Globulin 2.6 gm/dL    A/G Ratio 1.8 g/dL    BUN/Creatinine Ratio 19.0 7.0 - 25.0    Anion Gap 10.0 5.0 - 15.0 mmol/L    eGFR 40.1 (L) >60.0 mL/min/1.73   Results for orders placed or performed during the hospital encounter of 06/09/22   TISSUE EXAM, [facility] ([facility])    Specimen: A: Small Intestine, Duodenum; Tissue    B: Gastric, Antrum; Tissue    C: Gastric, Body; Tissue    D: Esophagus; Tissue    E: Large Intestine, Transverse Colon; Tissue    F: Large Intestine, Sigmoid Colon; Tissue   Result Value Ref Range    Reference Lab Report       [facility]  [address] [place] [address]  Phone: [phone] or [phone]  Fax: [phone]  Pete Gonzalez M.D., Medical Director    PATIENT NAME                           LABORATORY NO.  [id number]  [facility],  "LÓPEZ BRYAN.                      [id number]  [id number]                         AGE              SEX  SSN           CLIENT REF #  [facility]           67      1955  F    [id number]   [id number]    [place]                       REQUESTING M.D.     ATTENDING M.D.     COPY TO.  [address] [place] [address]                 [place] [address]             TOMI  DATE COLLECTED      DATE RECEIVED      DATE REPORTED  2022          06/10/2022         2022    DIAGNOSIS:  A.   DUODENUM, BIOPSY:  No significant histologic abnormality  B.   ANTRUM, BIOPSY:  Reactive gastropathy  C.   GASTRIC POLYPS:  Fundic gland polyps  D.   GE JUNCTION:  Reactive gastric and squamous mucosa  Negative  for specialized Alcantar's mucosa  E.   TRANSVERSE COLON POLYP:  Tubular adenoma  F.   SIGMOID COLON POLYP:  Tubular adenoma    JBS/pah    CLINICAL HISTORY:  Generalized abdominal pain  Nausea  Other constipation    SPECIMENS RECEIVED:  A.  DUODENUM, BIOPSY  B.  ANTRUM, BIOPSY  C.  GASTRIC POLYPS  D.  GE JUNCTION  E.  TRANSVERSE COLON POLYP  F.  SIGMOID COLON POLYP    MICROSCOPIC DESCRIPTION:  Tissue blocks are prepared and slides are examined microscopically on all  specimens. See diagnosis for details.    Professional interpretation rendered by Sp De M.D. at [facility],  [facility], [address] [place] [address] , [place] [address].    GROSS DESCRIPTION:  A.  Specimen is received in 1 formalin filled container \"duodenal biopsy\"  consists of a single piece of tan soft tissue measuring 0.3 x 0.2 x 0.2 cm.  Specimen is submitted entirely in 1 cassette.  B.  Specimen is received in 1 formalin filled container \"antrum biopsy\" and  consists of 2 pieces of tan soft tissue measuring 0.3 x 0.2 x  0.2 cm.  Specimen is submitted entirely in 1 cassette.  C.  Specimen is received in 1 formalin filled container \"gastric polyps\" and  consists of 2 pieces of tan soft tissue measuring 0.4 x " "0.2 x 0.1 cm.  Specimen is submitted entirely in 1 cassette.  D.    Specimen is received in 1 formalin filled container \"GE junction\" and  consists of a single piece of tan soft tissue measuring 0.3 x 0.3 x 0.1 cm.  Specimen is submitted entirely in 1 cassette.  E.  Specimen is received in 1 formalin filled container \"transverse polyp\" and  consists of 3 pieces of tan soft tissue measuring 0.5 x 0.3 x 0.2 cm.  Specimen is submitted entirely in 1 cassette.  F.  Specimen is received in 1 formalin filled container \"sigmoid polyp snare\"  and consists of 2 pieces of tan soft tissue measuring 0.4 x 0.3 x 0.2 cm.  Specimen is submitted entirely in 1 cassette.  MM    REVIEWED, DIAGNOSED AND ELECTRONICALLY  SIGNED BY:    Sp De M.D.  CPT CODES:  88305x6     Results for orders placed or performed during the hospital encounter of 06/04/18   Tilt Table   Result Value Ref Range    Target HR (85%) 132 bpm    Max. Pred. HR (100%) 155 bpm   Results for orders placed or performed during the hospital encounter of 05/24/18   Gold Top - SST   Result Value Ref Range    Extra Tube Hold for add-ons.    Green Top (Gel)   Result Value Ref Range    Extra Tube Hold for add-ons.    CK-MB    Specimen: Blood   Result Value Ref Range    CKMB 1.78 0.00 - 5.00 ng/mL   CBC Auto Differential    Specimen: Blood   Result Value Ref Range    WBC 7.78 3.20 - 9.80 10*3/mm3    RBC 4.59 3.77 - 5.16 10*6/mm3    Hemoglobin 13.7 12.0 - 15.5 g/dL    Hematocrit 40.0 35.0 - 45.0 %    MCV 87.1 80.0 - 98.0 fL    MCH 29.8 26.5 - 34.0 pg    MCHC 34.3 31.4 - 36.0 g/dL    RDW 13.3 11.5 - 14.5 %    RDW-SD 42.4 36.4 - 46.3 fl    MPV 10.5 8.0 - 12.0 fL    Platelets 301 150 - 450 10*3/mm3    Neutrophil % 56.7 37.0 - 80.0 %    Lymphocyte % 28.4 10.0 - 50.0 %    Monocyte % 11.1 0.0 - 12.0 %    Eosinophil % 2.1 0.0 - 7.0 %    Basophil % 0.8 0.0 - 2.0 %    Immature Grans % 0.9 (H) 0.0 - 0.5 %    Neutrophils, Absolute 4.42 2.00 - 8.60 10*3/mm3    Lymphocytes, Absolute " 2.21 0.60 - 4.20 10*3/mm3    Monocytes, Absolute 0.86 0.00 - 0.90 10*3/mm3    Eosinophils, Absolute 0.16 0.00 - 0.70 10*3/mm3    Basophils, Absolute 0.06 0.00 - 0.20 10*3/mm3    Immature Grans, Absolute 0.07 (H) 0.00 - 0.02 10*3/mm3   Lavender Top   Result Value Ref Range    Extra Tube hold for add-on    Lavender Top   Result Value Ref Range    Extra Tube hold for add-on    Light Blue Top   Result Value Ref Range    Extra Tube hold for add-on    Troponin    Specimen: Blood   Result Value Ref Range    Troponin I <0.012 <=0.034 ng/mL   Troponin    Specimen: Arm, Left; Blood   Result Value Ref Range    Troponin I <0.012 <=0.034 ng/mL   Troponin    Specimen: Blood   Result Value Ref Range    Troponin I <0.012 <=0.034 ng/mL   aPTT    Specimen: Blood   Result Value Ref Range    PTT 28.0 20.0 - 40.3 seconds   Protime-INR    Specimen: Blood   Result Value Ref Range    Protime 13.2 11.1 - 15.3 Seconds    INR 1.02 0.80 - 1.20   D-dimer, Quantitative    Specimen: Blood   Result Value Ref Range    D-Dimer, Quantitative 369 0 - 470 ng/mL (FEU)   CBC (No Diff)    Specimen: Blood   Result Value Ref Range    WBC 5.64 3.20 - 9.80 10*3/mm3    RBC 4.17 3.77 - 5.16 10*6/mm3    Hemoglobin 12.3 12.0 - 15.5 g/dL    Hematocrit 36.3 35.0 - 45.0 %    MCV 87.1 80.0 - 98.0 fL    MCH 29.5 26.5 - 34.0 pg    MCHC 33.9 31.4 - 36.0 g/dL    RDW 13.1 11.5 - 14.5 %    RDW-SD 41.9 36.4 - 46.3 fl    MPV 10.2 8.0 - 12.0 fL    Platelets 227 150 - 450 10*3/mm3   BNP    Specimen: Blood   Result Value Ref Range    proBNP 138.0 0.0 - 900.0 pg/mL   CK    Specimen: Blood   Result Value Ref Range    Creatine Kinase 106 30 - 135 U/L   Comprehensive Metabolic Panel    Specimen: Blood   Result Value Ref Range    Glucose 93 60 - 100 mg/dL    BUN 39 (H) 7 - 21 mg/dL    Creatinine 1.23 (H) 0.50 - 1.00 mg/dL    Sodium 138 137 - 145 mmol/L    Potassium 4.1 3.5 - 5.1 mmol/L    Chloride 103 95 - 110 mmol/L    CO2 24.0 22.0 - 31.0 mmol/L    Calcium 9.5 8.4 - 10.2 mg/dL     Total Protein 7.5 6.3 - 8.6 g/dL    Albumin 4.10 3.40 - 4.80 g/dL    ALT (SGPT) 36 9 - 52 U/L    AST (SGOT) 35 14 - 36 U/L    Alkaline Phosphatase 69 38 - 126 U/L    Total Bilirubin 0.4 0.2 - 1.3 mg/dL    eGFR Non  Amer 44 (L) 45 - 104 mL/min/1.73    Globulin 3.4 2.3 - 3.5 gm/dL    A/G Ratio 1.2 1.1 - 1.8 g/dL    BUN/Creatinine Ratio 31.7 (H) 7.0 - 25.0    Anion Gap 11.0 5.0 - 15.0 mmol/L   Basic Metabolic Panel    Specimen: Blood   Result Value Ref Range    Glucose 85 60 - 100 mg/dL    BUN 25 (H) 7 - 21 mg/dL    Creatinine 1.05 (H) 0.50 - 1.00 mg/dL    Sodium 139 137 - 145 mmol/L    Potassium 4.6 3.5 - 5.1 mmol/L    Chloride 105 95 - 110 mmol/L    CO2 27.0 22.0 - 31.0 mmol/L    Calcium 8.8 8.4 - 10.2 mg/dL    eGFR Non  Amer 53 45 - 104 mL/min/1.73    BUN/Creatinine Ratio 23.8 7.0 - 25.0    Anion Gap 7.0 5.0 - 15.0 mmol/L     *Note: Due to a large number of results and/or encounters for the requested time period, some results have not been displayed. A complete set of results can be found in Results Review.         This document has been electronically signed by Miguel Fiore MD on August 23, 2022 07:26 CDT

## 2022-09-01 ENCOUNTER — OFFICE VISIT (OUTPATIENT)
Dept: GASTROENTEROLOGY | Facility: CLINIC | Age: 67
End: 2022-09-01

## 2022-09-01 VITALS
HEIGHT: 67 IN | DIASTOLIC BLOOD PRESSURE: 81 MMHG | HEART RATE: 51 BPM | WEIGHT: 205.8 LBS | BODY MASS INDEX: 32.3 KG/M2 | SYSTOLIC BLOOD PRESSURE: 142 MMHG

## 2022-09-01 DIAGNOSIS — R10.10 PAIN OF UPPER ABDOMEN: Primary | ICD-10-CM

## 2022-09-01 PROCEDURE — 99214 OFFICE O/P EST MOD 30 MIN: CPT | Performed by: INTERNAL MEDICINE

## 2022-09-13 ENCOUNTER — HOSPITAL ENCOUNTER (OUTPATIENT)
Dept: NUCLEAR MEDICINE | Facility: HOSPITAL | Age: 67
Discharge: HOME | End: 2022-09-13

## 2022-09-13 DIAGNOSIS — R10.10 PAIN OF UPPER ABDOMEN: ICD-10-CM

## 2022-09-13 PROCEDURE — 73630 X-RAY EXAM OF FOOT: CPT | Performed by: NURSE PRACTITIONER

## 2022-09-13 PROCEDURE — A9537 TC99M MEBROFENIN: HCPCS | Performed by: INTERNAL MEDICINE

## 2022-09-13 PROCEDURE — 78226 HEPATOBILIARY SYSTEM IMAGING: CPT

## 2022-09-13 PROCEDURE — 0 TECHNETIUM TC 99M MEBROFENIN KIT: Performed by: INTERNAL MEDICINE

## 2022-09-13 RX ORDER — KIT FOR THE PREPARATION OF TECHNETIUM TC 99M MEBROFENIN 45 MG/10ML
1 INJECTION, POWDER, LYOPHILIZED, FOR SOLUTION INTRAVENOUS
Status: COMPLETED | OUTPATIENT
Start: 2022-09-13 | End: 2022-09-13

## 2022-09-13 RX ADMIN — MEBROFENIN 1 DOSE: 45 INJECTION, POWDER, LYOPHILIZED, FOR SOLUTION INTRAVENOUS at 07:20

## 2022-09-19 NOTE — PROGRESS NOTES
Chief Complaint   Patient presents with   • Abdominal Pain       Subjective    Maude Joyner is a 67 y.o. female.    History of Present Illness  Patient presented to GI clinic for follow-up visit today.  He has intermittent symptoms with epigastric pain with nausea.  Linzess has caused diarrhea.  Denied vomiting, melena, rectal bleeding or weight loss.  EGD was consistent with esophagitis, gastric polyp and gastritis.  Colonoscopy was consistent with diverticulosis, hemorrhoids and 3 polyps.  Path was consistent with fundic gland polyp, reactive gastropathy and tubular adenomas in the colon.  Right upper quadrant sonogram was consistent with fatty liver disease.  CT abdomen pelvis was unremarkable.       The following portions of the patient's history were reviewed and updated as appropriate:   Past Medical History:   Diagnosis Date   • Cervical spinal stenosis    • Dysuria    • Fibromyalgia    • GERD (gastroesophageal reflux disease)    • Hyperlipidemia    • Hypertension    • Osteoporosis    • Urinary tract infectious disease      Past Surgical History:   Procedure Laterality Date   • BACK SURGERY     • COLONOSCOPY     • COLONOSCOPY N/A 6/9/2022    Procedure: COLONOSCOPY;  Surgeon: Miguel Fiore MD;  Location: [facility] ENDOSCOPY;  Service: Gastroenterology;  Laterality: N/A;   • ENDOSCOPY N/A 6/9/2022    Procedure: ESOPHAGOGASTRODUODENOSCOPY;  Surgeon: Miguel Fiore MD;  Location: [facility] ENDOSCOPY;  Service: Gastroenterology;  Laterality: N/A;   • HYSTERECTOMY     • KNEE ARTHROPLASTY     • SINUS SURGERY       Family History   Problem Relation Age of Onset   • Heart disease Mother    • Hypertension Mother    • Colon cancer Father    • Cancer Father    • Heart disease Father    • Hypertension Father      OB History    No obstetric history on file.       Prior to Admission medications    Medication Sig Start Date End Date Taking? Authorizing Provider   linaclotide (LINZESS) 145 MCG capsule capsule Take 1  capsule by mouth Every Morning Before Breakfast for 30 days. 9/1/22 10/1/22 Yes Miguel Fiore MD   acetaminophen (TYLENOL) 500 MG tablet Take 1,000 mg by mouth 2 (Two) Times a Day.    Yeimy Bah MD   ascorbic acid (VITAMIN C) 100 MG tablet Take 100 mg by mouth Daily.    Yeimy Bah MD   cholecalciferol (VITAMIN D3) 25 MCG (1000 UT) tablet Take 2,000 Units by mouth Daily.    Yeimy Bah MD   escitalopram (LEXAPRO) 20 MG tablet Take 20 mg by mouth Daily. 8/6/18   Yeimy Bah MD   fenofibrate (TRICOR) 145 MG tablet Take 145 mg by mouth Daily.    Yeimy Bah MD   irbesartan (AVAPRO) 150 MG tablet Take 1 tablet by mouth Daily. 1/11/19   Robbie Boothe MD   metoprolol succinate XL (TOPROL-XL) 100 MG 24 hr tablet Take 1 tablet by mouth every night at bedtime.  Patient taking differently: 1/2 tablet by mouth in A.M. and 1/2 tablet by mouth in P.M. 1/11/19   Robbie Boothe MD   NIACIN PO Take 500 mg by mouth Daily.    Yeimy Bah MD   omeprazole (priLOSEC) 40 MG capsule Take 1 capsule by mouth Daily. 5/27/22   Miguel Fiore MD   simvastatin (ZOCOR) 20 MG tablet Take 20 mg by mouth Daily. 3/24/22   Yeimy Bah MD   temazepam (RESTORIL) 30 MG capsule Take 30 mg by mouth every night at bedtime. 8/7/18   Yeimy Bah MD   traMADol (ULTRAM) 50 MG tablet Take 50 mg by mouth 2 (Two) Times a Day.    Yeimy Bah MD   triamcinolone (KENALOG) 0.5 % cream Apply 1 application topically 3 (Three) Times a Day.    Yeimy Bah MD     Allergies   Allergen Reactions   • Ace Inhibitors Dizziness   • Carafate [Sucralfate] Other (See Comments)     Abdominal Pain   • Cat Hair Extract Unknown - Low Severity   • Chocolate Flavor Unknown - Low Severity   • Gabapentin Swelling   • Gemfibrozil Swelling   • Morphine Unknown - Low Severity   • Oxycodone Itching   • Strawberry Unknown - Low Severity   • Sulfate Unknown - Low Severity  "    Other reaction(s): Rash   • Tomato Unknown - Low Severity   • Doxycycline Rash   • Latex Rash   • Morphine Sulfate Rash   • Nickel Rash   • Other Rash     Electrode adhesive- burning and rash     • Sulfa Antibiotics Rash   • Tapentadol Rash     Rash     Social History     Socioeconomic History   • Marital status:    Tobacco Use   • Smoking status: Former Smoker   • Smokeless tobacco: Never Used   Vaping Use   • Vaping Use: Never used   Substance and Sexual Activity   • Alcohol use: No   • Drug use: No   • Sexual activity: Defer       Review of Systems  Review of Systems   Constitutional: Negative for chills, fatigue, fever and unexpected weight change.   HENT: Negative for congestion, ear discharge, hearing loss, nosebleeds and sore throat.    Eyes: Negative for pain, discharge and redness.   Respiratory: Negative for cough, chest tightness, shortness of breath and wheezing.    Cardiovascular: Negative for chest pain and palpitations.   Gastrointestinal: Positive for abdominal pain, constipation, diarrhea and nausea. Negative for abdominal distention, blood in stool and vomiting.   Endocrine: Negative for cold intolerance, polydipsia, polyphagia and polyuria.   Genitourinary: Negative for dysuria, flank pain, frequency, hematuria and urgency.   Musculoskeletal: Negative for arthralgias, back pain, joint swelling and myalgias.   Skin: Negative for color change, pallor and rash.   Neurological: Negative for tremors, seizures, syncope, weakness and headaches.   Hematological: Negative for adenopathy. Does not bruise/bleed easily.   Psychiatric/Behavioral: Negative for behavioral problems, confusion, dysphoric mood, hallucinations and suicidal ideas. The patient is not nervous/anxious.         /81 (BP Location: Left arm)   Pulse 51   Ht 170.2 cm (67\")   Wt 93.4 kg (205 lb 12.8 oz)   LMP 05/01/2004 (Approximate)   BMI 32.23 kg/m²     Objective    Physical Exam  Constitutional:       Appearance: She " is well-developed.   HENT:      Head: Normocephalic and atraumatic.   Eyes:      Conjunctiva/sclera: Conjunctivae normal.      Pupils: Pupils are equal, round, and reactive to light.   Neck:      Thyroid: No thyromegaly.   Cardiovascular:      Rate and Rhythm: Normal rate and regular rhythm.      Heart sounds: Normal heart sounds. No murmur heard.  Pulmonary:      Effort: Pulmonary effort is normal.      Breath sounds: Normal breath sounds. No wheezing.   Abdominal:      General: Bowel sounds are normal. There is no distension.      Palpations: Abdomen is soft. There is no mass.      Tenderness: There is no abdominal tenderness.      Hernia: No hernia is present.   Genitourinary:     Comments: No lesions noted  Musculoskeletal:         General: No tenderness. Normal range of motion.      Cervical back: Normal range of motion and neck supple.   Lymphadenopathy:      Cervical: No cervical adenopathy.   Skin:     General: Skin is warm and dry.      Findings: No rash.   Neurological:      Mental Status: She is alert and oriented to person, place, and time.      Cranial Nerves: No cranial nerve deficit.   Psychiatric:         Thought Content: Thought content normal.       Lab on 08/23/2022   Component Date Value Ref Range Status   • BUN 08/23/2022 33 (A) 8 - 23 mg/dL Final   • Creatinine 08/23/2022 1.22 (A) 0.57 - 1.00 mg/dL Final   • eGFR 08/23/2022 48.1 (A) >60.0 mL/min/1.73 Final    National Kidney Foundation and American Society of Nephrology (ASN) Task Force recommended calculation based on the Chronic Kidney Disease Epidemiology Collaboration (CKD-EPI) equation refit without adjustment for race.     Assessment & Plan      1. Pain of upper abdomen     1.  Abdominal pain associated with nausea, likely due to reactive gastropathy and need to rule out  biliary pathology given the worsening with food intake.  Continue Prilosec po 40 mg p.o. daily and Carafate 1 g p.o. 4 times daily along with low-fat diet.  Obtain HIDA  scan.  2.  Abdominal pain with the constipation, likely due to IBS.  Continue Metamucil, MiraLAX and high-fiber diet.   Decrease Linzess to 145 mcg p.o. daily.  3.  Colon polyps and Family history of colon cancer, repeat colonoscopy in 3 years.  4.  GERD, well controlled.  Continue PPI and antireflux lifestyle.    5.  Diverticulosis, continue Metamucil 1 tablespoonful twice daily.  6.  Obesity, recommend exercise and diet control.  7.  Fatty liver disease, continue exercise and diet control.        Orders placed during this encounter include:  Orders Placed This Encounter   Procedures   • NM Hepatobiliary Without CCK     Standing Status:   Future     Number of Occurrences:   1     Standing Expiration Date:   9/1/2023     Order Specific Question:   Do you want ejection fraction for this procedure?     Answer:   Yes     Order Specific Question:   Release to patient     Answer:   Immediate       * Surgery not found *    Review and/or summary of lab tests, radiology, procedures, medications. Review and summary of old records and obtaining of history. The risks and benefits of my recommendations, as well as other treatment options were discussed with the patient today. Questions were answered.    New Medications Ordered This Visit   Medications   • linaclotide (LINZESS) 145 MCG capsule capsule     Sig: Take 1 capsule by mouth Every Morning Before Breakfast for 30 days.     Dispense:  30 capsule     Refill:  6       Follow-up: Return in about 1 month (around 10/1/2022).               Results for orders placed or performed in visit on 08/23/22   BUN    Specimen: Blood   Result Value Ref Range    BUN 33 (H) 8 - 23 mg/dL   Creatinine, Serum    Specimen: Blood   Result Value Ref Range    Creatinine 1.22 (H) 0.57 - 1.00 mg/dL    eGFR 48.1 (L) >60.0 mL/min/1.73   Results for orders placed or performed in visit on 07/11/22   Hepatitis Panel, Acute    Specimen: Blood   Result Value Ref Range    Hepatitis B Surface Ag Non-Reactive  Non-Reactive    Hep A IgM Non-Reactive Non-Reactive    Hep B C IgM Non-Reactive Non-Reactive    Hepatitis C Ab Non-Reactive Non-Reactive   Comprehensive Metabolic Panel    Specimen: Blood   Result Value Ref Range    Glucose 100 (H) 65 - 99 mg/dL    BUN 27 (H) 8 - 23 mg/dL    Creatinine 1.42 (H) 0.57 - 1.00 mg/dL    Sodium 140 136 - 145 mmol/L    Potassium 4.5 3.5 - 5.2 mmol/L    Chloride 104 98 - 107 mmol/L    CO2 26.0 22.0 - 29.0 mmol/L    Calcium 9.9 8.6 - 10.5 mg/dL    Total Protein 7.3 6.0 - 8.5 g/dL    Albumin 4.70 3.50 - 5.20 g/dL    ALT (SGPT) 25 1 - 33 U/L    AST (SGOT) 33 (H) 1 - 32 U/L    Alkaline Phosphatase 69 39 - 117 U/L    Total Bilirubin 0.6 0.0 - 1.2 mg/dL    Globulin 2.6 gm/dL    A/G Ratio 1.8 g/dL    BUN/Creatinine Ratio 19.0 7.0 - 25.0    Anion Gap 10.0 5.0 - 15.0 mmol/L    eGFR 40.1 (L) >60.0 mL/min/1.73   Results for orders placed or performed during the hospital encounter of 22   TISSUE EXAM, [facility] ([facility])    Specimen: A: Small Intestine, Duodenum; Tissue    B: Gastric, Antrum; Tissue    C: Gastric, Body; Tissue    D: Esophagus; Tissue    E: Large Intestine, Transverse Colon; Tissue    F: Large Intestine, Sigmoid Colon; Tissue   Result Value Ref Range    Reference Lab Report       [facility]  [address] [place] [address]  Phone: [phone] or [phone]  Fax: [phone]  Pete Gonzalez M.D., Medical Director    PATIENT NAME                           LABORATORY NO.  [id number]                      [id number]  [id number]                         AGE              SEX  SSN           CLIENT REF #  [facility]           67      1955  F    [id number]   [id number]    Diana                       REQUESTING M.D.     ATTENDING M.D.     COPY TO.  [address] [place] [address]                 [place] [address]             [facility]  DATE COLLECTED      DATE RECEIVED      DATE  "REPORTED  06/09/2022          06/10/2022         06/13/2022    DIAGNOSIS:  A.   DUODENUM, BIOPSY:  No significant histologic abnormality  B.   ANTRUM, BIOPSY:  Reactive gastropathy  C.   GASTRIC POLYPS:  Fundic gland polyps  D.   GE JUNCTION:  Reactive gastric and squamous mucosa  Negative  for specialized Alcantar's mucosa  E.   TRANSVERSE COLON POLYP:  Tubular adenoma  F.   SIGMOID COLON POLYP:  Tubular adenoma    JBS/pah    CLINICAL HISTORY:  Generalized abdominal pain  Nausea  Other constipation    SPECIMENS RECEIVED:  A.  DUODENUM, BIOPSY  B.  ANTRUM, BIOPSY  C.  GASTRIC POLYPS  D.  GE JUNCTION  E.  TRANSVERSE COLON POLYP  F.  SIGMOID COLON POLYP    MICROSCOPIC DESCRIPTION:  Tissue blocks are prepared and slides are examined microscopically on all  specimens. See diagnosis for details.    Professional interpretation rendered by Sp De M.D. at [facility], [address] [place] [address].    GROSS DESCRIPTION:  A.  Specimen is received in 1 formalin filled container \"duodenal biopsy\"  consists of a single piece of tan soft tissue measuring 0.3 x 0.2 x 0.2 cm.  Specimen is submitted entirely in 1 cassette.  B.  Specimen is received in 1 formalin filled container \"antrum biopsy\" and  consists of 2 pieces of tan soft tissue measuring 0.3 x 0.2 x  0.2 cm.  Specimen is submitted entirely in 1 cassette.  C.  Specimen is received in 1 formalin filled container \"gastric polyps\" and  consists of 2 pieces of tan soft tissue measuring 0.4 x 0.2 x 0.1 cm.  Specimen is submitted entirely in 1 cassette.  D.    Specimen is received in 1 formalin filled container \"GE junction\" and  consists of a single piece of tan soft tissue measuring 0.3 x 0.3 x 0.1 cm.  Specimen is submitted entirely in 1 cassette.  E.  Specimen is received in 1 formalin filled container \"transverse polyp\" and  consists of 3 pieces of tan soft tissue measuring 0.5 x 0.3 x 0.2 cm.  Specimen is submitted entirely in 1 cassette.  F.  " "Specimen is received in 1 formalin filled container \"sigmoid polyp snare\"  and consists of 2 pieces of tan soft tissue measuring 0.4 x 0.3 x 0.2 cm.  Specimen is submitted entirely in 1 cassette.  MM    REVIEWED, DIAGNOSED AND ELECTRONICALLY  SIGNED BY:    Sp De M.D.  CPT CODES:  88305x6     Results for orders placed or performed during the hospital encounter of 06/04/18   Tilt Table   Result Value Ref Range    Target HR (85%) 132 bpm    Max. Pred. HR (100%) 155 bpm   Results for orders placed or performed during the hospital encounter of 05/24/18   Gold Top - SST   Result Value Ref Range    Extra Tube Hold for add-ons.    Green Top (Gel)   Result Value Ref Range    Extra Tube Hold for add-ons.    CK-MB    Specimen: Blood   Result Value Ref Range    CKMB 1.78 0.00 - 5.00 ng/mL   CBC Auto Differential    Specimen: Blood   Result Value Ref Range    WBC 7.78 3.20 - 9.80 10*3/mm3    RBC 4.59 3.77 - 5.16 10*6/mm3    Hemoglobin 13.7 12.0 - 15.5 g/dL    Hematocrit 40.0 35.0 - 45.0 %    MCV 87.1 80.0 - 98.0 fL    MCH 29.8 26.5 - 34.0 pg    MCHC 34.3 31.4 - 36.0 g/dL    RDW 13.3 11.5 - 14.5 %    RDW-SD 42.4 36.4 - 46.3 fl    MPV 10.5 8.0 - 12.0 fL    Platelets 301 150 - 450 10*3/mm3    Neutrophil % 56.7 37.0 - 80.0 %    Lymphocyte % 28.4 10.0 - 50.0 %    Monocyte % 11.1 0.0 - 12.0 %    Eosinophil % 2.1 0.0 - 7.0 %    Basophil % 0.8 0.0 - 2.0 %    Immature Grans % 0.9 (H) 0.0 - 0.5 %    Neutrophils, Absolute 4.42 2.00 - 8.60 10*3/mm3    Lymphocytes, Absolute 2.21 0.60 - 4.20 10*3/mm3    Monocytes, Absolute 0.86 0.00 - 0.90 10*3/mm3    Eosinophils, Absolute 0.16 0.00 - 0.70 10*3/mm3    Basophils, Absolute 0.06 0.00 - 0.20 10*3/mm3    Immature Grans, Absolute 0.07 (H) 0.00 - 0.02 10*3/mm3   Lavender Top   Result Value Ref Range    Extra Tube hold for add-on    Lavender Top   Result Value Ref Range    Extra Tube hold for add-on    Light Blue Top   Result Value Ref Range    Extra Tube hold for add-on    Troponin    " Specimen: Blood   Result Value Ref Range    Troponin I <0.012 <=0.034 ng/mL   Troponin    Specimen: Arm, Left; Blood   Result Value Ref Range    Troponin I <0.012 <=0.034 ng/mL   Troponin    Specimen: Blood   Result Value Ref Range    Troponin I <0.012 <=0.034 ng/mL   aPTT    Specimen: Blood   Result Value Ref Range    PTT 28.0 20.0 - 40.3 seconds   Protime-INR    Specimen: Blood   Result Value Ref Range    Protime 13.2 11.1 - 15.3 Seconds    INR 1.02 0.80 - 1.20   D-dimer, Quantitative    Specimen: Blood   Result Value Ref Range    D-Dimer, Quantitative 369 0 - 470 ng/mL (FEU)   CBC (No Diff)    Specimen: Blood   Result Value Ref Range    WBC 5.64 3.20 - 9.80 10*3/mm3    RBC 4.17 3.77 - 5.16 10*6/mm3    Hemoglobin 12.3 12.0 - 15.5 g/dL    Hematocrit 36.3 35.0 - 45.0 %    MCV 87.1 80.0 - 98.0 fL    MCH 29.5 26.5 - 34.0 pg    MCHC 33.9 31.4 - 36.0 g/dL    RDW 13.1 11.5 - 14.5 %    RDW-SD 41.9 36.4 - 46.3 fl    MPV 10.2 8.0 - 12.0 fL    Platelets 227 150 - 450 10*3/mm3   BNP    Specimen: Blood   Result Value Ref Range    proBNP 138.0 0.0 - 900.0 pg/mL   CK    Specimen: Blood   Result Value Ref Range    Creatine Kinase 106 30 - 135 U/L   Comprehensive Metabolic Panel    Specimen: Blood   Result Value Ref Range    Glucose 93 60 - 100 mg/dL    BUN 39 (H) 7 - 21 mg/dL    Creatinine 1.23 (H) 0.50 - 1.00 mg/dL    Sodium 138 137 - 145 mmol/L    Potassium 4.1 3.5 - 5.1 mmol/L    Chloride 103 95 - 110 mmol/L    CO2 24.0 22.0 - 31.0 mmol/L    Calcium 9.5 8.4 - 10.2 mg/dL    Total Protein 7.5 6.3 - 8.6 g/dL    Albumin 4.10 3.40 - 4.80 g/dL    ALT (SGPT) 36 9 - 52 U/L    AST (SGOT) 35 14 - 36 U/L    Alkaline Phosphatase 69 38 - 126 U/L    Total Bilirubin 0.4 0.2 - 1.3 mg/dL    eGFR Non  Amer 44 (L) 45 - 104 mL/min/1.73    Globulin 3.4 2.3 - 3.5 gm/dL    A/G Ratio 1.2 1.1 - 1.8 g/dL    BUN/Creatinine Ratio 31.7 (H) 7.0 - 25.0    Anion Gap 11.0 5.0 - 15.0 mmol/L   Basic Metabolic Panel    Specimen: Blood   Result Value Ref  Range    Glucose 85 60 - 100 mg/dL    BUN 25 (H) 7 - 21 mg/dL    Creatinine 1.05 (H) 0.50 - 1.00 mg/dL    Sodium 139 137 - 145 mmol/L    Potassium 4.6 3.5 - 5.1 mmol/L    Chloride 105 95 - 110 mmol/L    CO2 27.0 22.0 - 31.0 mmol/L    Calcium 8.8 8.4 - 10.2 mg/dL    eGFR Non  Amer 53 45 - 104 mL/min/1.73    BUN/Creatinine Ratio 23.8 7.0 - 25.0    Anion Gap 7.0 5.0 - 15.0 mmol/L     *Note: Due to a large number of results and/or encounters for the requested time period, some results have not been displayed. A complete set of results can be found in Results Review.         This document has been electronically signed by Miguel Fiore MD on September 19, 2022 06:54 CDT

## 2022-09-20 ENCOUNTER — OFFICE VISIT (OUTPATIENT)
Dept: GASTROENTEROLOGY | Facility: CLINIC | Age: 67
End: 2022-09-20

## 2022-09-20 VITALS
BODY MASS INDEX: 31.39 KG/M2 | HEART RATE: 65 BPM | WEIGHT: 200 LBS | SYSTOLIC BLOOD PRESSURE: 133 MMHG | HEIGHT: 67 IN | DIASTOLIC BLOOD PRESSURE: 87 MMHG

## 2022-09-20 DIAGNOSIS — K76.0 FATTY LIVER: ICD-10-CM

## 2022-09-20 DIAGNOSIS — R10.10 PAIN OF UPPER ABDOMEN: Primary | ICD-10-CM

## 2022-09-20 PROCEDURE — 99214 OFFICE O/P EST MOD 30 MIN: CPT | Performed by: INTERNAL MEDICINE

## 2022-10-09 NOTE — PROGRESS NOTES
Chief Complaint   Patient presents with   • Follow-up       Subjective    Maude Joyner is a 67 y.o. female.    History of Present Illness  Patient presented to GI clinic for follow-up visit today.  Has occasional symptoms with abdominal pain.  Constipation is improving.  GERD is well controlled.  Denies nausea or vomiting.  Weight is stable.  HIDA scan was unremarkable.       The following portions of the patient's history were reviewed and updated as appropriate:   Past Medical History:   Diagnosis Date   • Cervical spinal stenosis    • Dysuria    • Fibromyalgia    • GERD (gastroesophageal reflux disease)    • Hyperlipidemia    • Hypertension    • Osteoporosis    • Urinary tract infectious disease      Past Surgical History:   Procedure Laterality Date   • BACK SURGERY     • COLONOSCOPY     • COLONOSCOPY N/A 6/9/2022    Procedure: COLONOSCOPY;  Surgeon: Miguel Fiore MD;  Location: [facility] ENDOSCOPY;  Service: Gastroenterology;  Laterality: N/A;   • ENDOSCOPY N/A 6/9/2022    Procedure: ESOPHAGOGASTRODUODENOSCOPY;  Surgeon: Miguel Fiore MD;  Location: [facility] ENDOSCOPY;  Service: Gastroenterology;  Laterality: N/A;   • HYSTERECTOMY     • KNEE ARTHROPLASTY     • SINUS SURGERY       Family History   Problem Relation Age of Onset   • Heart disease Mother    • Hypertension Mother    • Colon cancer Father    • Cancer Father    • Heart disease Father    • Hypertension Father      OB History    No obstetric history on file.       Prior to Admission medications    Medication Sig Start Date End Date Taking? Authorizing Provider   acetaminophen (TYLENOL) 500 MG tablet Take 1,000 mg by mouth 2 (Two) Times a Day.    Yeimy Bah MD   ascorbic acid (VITAMIN C) 100 MG tablet Take 100 mg by mouth Daily.    Yeimy Bah MD   cholecalciferol (VITAMIN D3) 25 MCG (1000 UT) tablet Take 2,000 Units by mouth Daily.    Yeimy Bah MD   escitalopram (LEXAPRO) 20 MG tablet Take 20 mg by mouth Daily.  8/6/18   Yeimy Bah MD   fenofibrate (TRICOR) 145 MG tablet Take 145 mg by mouth Daily.    Yeimy Bah MD   irbesartan (AVAPRO) 150 MG tablet Take 1 tablet by mouth Daily. 1/11/19   Robbie Boothe MD   metoprolol succinate XL (TOPROL-XL) 100 MG 24 hr tablet Take 1 tablet by mouth every night at bedtime.  Patient taking differently: 1/2 tablet by mouth in A.M. and 1/2 tablet by mouth in P.M. 1/11/19   Robbie Boothe MD   NIACIN PO Take 500 mg by mouth Daily.    Yeimy Bah MD   omeprazole (priLOSEC) 40 MG capsule Take 1 capsule by mouth Daily. 5/27/22   Miguel Fiore MD   simvastatin (ZOCOR) 20 MG tablet Take 20 mg by mouth Daily. 3/24/22   Yeimy Bah MD   temazepam (RESTORIL) 30 MG capsule Take 30 mg by mouth every night at bedtime. 8/7/18   Yeimy Bah MD   traMADol (ULTRAM) 50 MG tablet Take 50 mg by mouth 2 (Two) Times a Day.    Yeimy Bah MD   triamcinolone (KENALOG) 0.5 % cream Apply 1 application topically 3 (Three) Times a Day.    Yeimy Bah MD     Allergies   Allergen Reactions   • Ace Inhibitors Dizziness   • Carafate [Sucralfate] Other (See Comments)     Abdominal Pain   • Cat Hair Extract Unknown - Low Severity   • Chocolate Flavor Unknown - Low Severity   • Gabapentin Swelling   • Gemfibrozil Swelling   • Morphine Unknown - Low Severity   • Oxycodone Itching   • Strawberry Unknown - Low Severity   • Sulfate Unknown - Low Severity     Other reaction(s): Rash   • Tomato Unknown - Low Severity   • Doxycycline Rash   • Latex Rash   • Morphine Sulfate Rash   • Nickel Rash   • Other Rash     Electrode adhesive- burning and rash     • Sulfa Antibiotics Rash   • Tapentadol Rash     Rash     Social History     Socioeconomic History   • Marital status:    Tobacco Use   • Smoking status: Former   • Smokeless tobacco: Never   Vaping Use   • Vaping Use: Never used   Substance and Sexual Activity   • Alcohol use: No   •  "Drug use: No   • Sexual activity: Defer       Review of Systems  Review of Systems   Constitutional: Negative for chills, fatigue, fever and unexpected weight change.   HENT: Negative for congestion, ear discharge, hearing loss, nosebleeds and sore throat.    Eyes: Negative for pain, discharge and redness.   Respiratory: Negative for cough, chest tightness, shortness of breath and wheezing.    Cardiovascular: Negative for chest pain and palpitations.   Gastrointestinal: Positive for abdominal pain and constipation. Negative for abdominal distention, blood in stool, diarrhea, nausea and vomiting.   Endocrine: Negative for cold intolerance, polydipsia, polyphagia and polyuria.   Genitourinary: Negative for dysuria, flank pain, frequency, hematuria and urgency.   Musculoskeletal: Negative for arthralgias, back pain, joint swelling and myalgias.   Skin: Negative for color change, pallor and rash.   Neurological: Negative for tremors, seizures, syncope, weakness and headaches.   Hematological: Negative for adenopathy. Does not bruise/bleed easily.   Psychiatric/Behavioral: Negative for behavioral problems, confusion, dysphoric mood, hallucinations and suicidal ideas. The patient is not nervous/anxious.         /87   Pulse 65   Ht 170.2 cm (67\")   Wt 90.7 kg (200 lb)   LMP 05/01/2004 (Approximate)   BMI 31.32 kg/m²     Objective    Physical Exam  Constitutional:       Appearance: She is well-developed.   HENT:      Head: Normocephalic and atraumatic.   Eyes:      Conjunctiva/sclera: Conjunctivae normal.      Pupils: Pupils are equal, round, and reactive to light.   Neck:      Thyroid: No thyromegaly.   Cardiovascular:      Rate and Rhythm: Normal rate and regular rhythm.      Heart sounds: Normal heart sounds. No murmur heard.  Pulmonary:      Effort: Pulmonary effort is normal.      Breath sounds: Normal breath sounds. No wheezing.   Abdominal:      General: Bowel sounds are normal. There is no distension.     "  Palpations: Abdomen is soft. There is no mass.      Tenderness: There is no abdominal tenderness.      Hernia: No hernia is present.   Genitourinary:     Comments: No lesions noted  Musculoskeletal:         General: No tenderness. Normal range of motion.      Cervical back: Normal range of motion and neck supple.   Lymphadenopathy:      Cervical: No cervical adenopathy.   Skin:     General: Skin is warm and dry.      Findings: No rash.   Neurological:      Mental Status: She is alert and oriented to person, place, and time.      Cranial Nerves: No cranial nerve deficit.   Psychiatric:         Thought Content: Thought content normal.       Lab on 08/23/2022   Component Date Value Ref Range Status   • BUN 08/23/2022 33 (H)  8 - 23 mg/dL Final   • Creatinine 08/23/2022 1.22 (H)  0.57 - 1.00 mg/dL Final   • eGFR 08/23/2022 48.1 (L)  >60.0 mL/min/1.73 Final    National Kidney Foundation and American Society of Nephrology (ASN) Task Force recommended calculation based on the Chronic Kidney Disease Epidemiology Collaboration (CKD-EPI) equation refit without adjustment for race.     Assessment & Plan    No diagnosis found..   1.  Abdominal pain, improving.  Continue PPI and Carafate.  2.  Constipation, improving.  Likely due to IBS.  Continue Linzess, MiraLAX and high-fiber diet.  3.  Colon polyps and family history of colon cancer, repeat colonoscopy in 3 years.  4.  GERD, well controlled.  Continue PPI and antireflux lifestyle.  5.  Diverticulosis, continue Metamucil.  Continue high-fiber diet.  6.  Obesity, recommend exercise and diet control.  7.  Fatty liver disease, continue exercise and diet control.  Obtain LFTs in next visit.    Orders placed during this encounter include:  No orders of the defined types were placed in this encounter.      * Surgery not found *    Review and/or summary of lab tests, radiology, procedures, medications. Review and summary of old records and obtaining of history. The risks and  benefits of my recommendations, as well as other treatment options were discussed with the patient today. Questions were answered.    No orders of the defined types were placed in this encounter.      Follow-up: Return in about 1 month (around 10/20/2022).               Results for orders placed or performed in visit on 08/23/22   BUN    Specimen: Blood   Result Value Ref Range    BUN 33 (H) 8 - 23 mg/dL   Creatinine, Serum    Specimen: Blood   Result Value Ref Range    Creatinine 1.22 (H) 0.57 - 1.00 mg/dL    eGFR 48.1 (L) >60.0 mL/min/1.73   Results for orders placed or performed in visit on 07/11/22   Hepatitis Panel, Acute    Specimen: Blood   Result Value Ref Range    Hepatitis B Surface Ag Non-Reactive Non-Reactive    Hep A IgM Non-Reactive Non-Reactive    Hep B C IgM Non-Reactive Non-Reactive    Hepatitis C Ab Non-Reactive Non-Reactive   Comprehensive Metabolic Panel    Specimen: Blood   Result Value Ref Range    Glucose 100 (H) 65 - 99 mg/dL    BUN 27 (H) 8 - 23 mg/dL    Creatinine 1.42 (H) 0.57 - 1.00 mg/dL    Sodium 140 136 - 145 mmol/L    Potassium 4.5 3.5 - 5.2 mmol/L    Chloride 104 98 - 107 mmol/L    CO2 26.0 22.0 - 29.0 mmol/L    Calcium 9.9 8.6 - 10.5 mg/dL    Total Protein 7.3 6.0 - 8.5 g/dL    Albumin 4.70 3.50 - 5.20 g/dL    ALT (SGPT) 25 1 - 33 U/L    AST (SGOT) 33 (H) 1 - 32 U/L    Alkaline Phosphatase 69 39 - 117 U/L    Total Bilirubin 0.6 0.0 - 1.2 mg/dL    Globulin 2.6 gm/dL    A/G Ratio 1.8 g/dL    BUN/Creatinine Ratio 19.0 7.0 - 25.0    Anion Gap 10.0 5.0 - 15.0 mmol/L    eGFR 40.1 (L) >60.0 mL/min/1.73   Results for orders placed or performed during the hospital encounter of 06/09/22   TISSUE EXAM, [facility] ([facility])    Specimen: A: Small Intestine, Duodenum; Tissue    B: Gastric, Antrum; Tissue    C: Gastric, Body; Tissue    D: Esophagus; Tissue    E: Large Intestine, Transverse Colon; Tissue    F: Large Intestine, Sigmoid Colon; Tissue   Result Value Ref Range    Reference Lab Report  "      [facility]  [address] [place] [address]  Phone: [phone] or [phone]  Fax: [phone]  Pete Gonzalez M.D., Medical Director    PATIENT NAME                           LABORATORY NO.  [id number]                      [id number]  [id number]                         AGE              SEX  N           CLIENT REF #  [facility]           67      1955  F    [id number]   [id number]    [place]ullan                       REQUESTING M.D.     ATTENDING M.D.     COPY TO.  [address] [place] [address]                 EZIO PRITCHARD JAYNA  [address]  DATE COLLECTED      DATE RECEIVED      DATE REPORTED  2022          06/10/2022         2022    DIAGNOSIS:  A.   DUODENUM, BIOPSY:  No significant histologic abnormality  B.   ANTRUM, BIOPSY:  Reactive gastropathy  C.   GASTRIC POLYPS:  Fundic gland polyps  D.   GE JUNCTION:  Reactive gastric and squamous mucosa  Negative  for specialized Alcantar's mucosa  E.   TRANSVERSE COLON POLYP:  Tubular adenoma  F.   SIGMOID COLON POLYP:  Tubular adenoma    JBS/pah    CLINICAL HISTORY:  Generalized abdominal pain  Nausea  Other constipation    SPECIMENS RECEIVED:  A.  DUODENUM, BIOPSY  B.  ANTRUM, BIOPSY  C.  GASTRIC POLYPS  D.  GE JUNCTION  E.  TRANSVERSE COLON POLYP  F.  SIGMOID COLON POLYP    MICROSCOPIC DESCRIPTION:  Tissue blocks are prepared and slides are examined microscopically on all  specimens. See diagnosis for details.    Professional interpretation rendered by Sp De M.D. at [facility], [address] [place] [address].    GROSS DESCRIPTION:  A.  Specimen is received in 1 formalin filled container \"duodenal biopsy\"  consists of a single piece of tan soft tissue measuring 0.3 x 0.2 x 0.2 cm.  Specimen is submitted entirely in 1 cassette.  B.  Specimen is received in 1 formalin filled container \"antrum biopsy\" " "and  consists of 2 pieces of tan soft tissue measuring 0.3 x 0.2 x  0.2 cm.  Specimen is submitted entirely in 1 cassette.  C.  Specimen is received in 1 formalin filled container \"gastric polyps\" and  consists of 2 pieces of tan soft tissue measuring 0.4 x 0.2 x 0.1 cm.  Specimen is submitted entirely in 1 cassette.  D.    Specimen is received in 1 formalin filled container \"GE junction\" and  consists of a single piece of tan soft tissue measuring 0.3 x 0.3 x 0.1 cm.  Specimen is submitted entirely in 1 cassette.  E.  Specimen is received in 1 formalin filled container \"transverse polyp\" and  consists of 3 pieces of tan soft tissue measuring 0.5 x 0.3 x 0.2 cm.  Specimen is submitted entirely in 1 cassette.  F.  Specimen is received in 1 formalin filled container \"sigmoid polyp snare\"  and consists of 2 pieces of tan soft tissue measuring 0.4 x 0.3 x 0.2 cm.  Specimen is submitted entirely in 1 cassette.  MM    REVIEWED, DIAGNOSED AND ELECTRONICALLY  SIGNED BY:    Sp De M.D.  CPT CODES:  88305x6     Results for orders placed or performed during the hospital encounter of 06/04/18   Tilt Table   Result Value Ref Range    Target HR (85%) 132 bpm    Max. Pred. HR (100%) 155 bpm   Results for orders placed or performed during the hospital encounter of 05/24/18   Gold Top - SST   Result Value Ref Range    Extra Tube Hold for add-ons.    Green Top (Gel)   Result Value Ref Range    Extra Tube Hold for add-ons.    CK-MB    Specimen: Blood   Result Value Ref Range    CKMB 1.78 0.00 - 5.00 ng/mL   CBC Auto Differential    Specimen: Blood   Result Value Ref Range    WBC 7.78 3.20 - 9.80 10*3/mm3    RBC 4.59 3.77 - 5.16 10*6/mm3    Hemoglobin 13.7 12.0 - 15.5 g/dL    Hematocrit 40.0 35.0 - 45.0 %    MCV 87.1 80.0 - 98.0 fL    MCH 29.8 26.5 - 34.0 pg    MCHC 34.3 31.4 - 36.0 g/dL    RDW 13.3 11.5 - 14.5 %    RDW-SD 42.4 36.4 - 46.3 fl    MPV 10.5 8.0 - 12.0 fL    Platelets 301 150 - 450 10*3/mm3    Neutrophil % 56.7 " 37.0 - 80.0 %    Lymphocyte % 28.4 10.0 - 50.0 %    Monocyte % 11.1 0.0 - 12.0 %    Eosinophil % 2.1 0.0 - 7.0 %    Basophil % 0.8 0.0 - 2.0 %    Immature Grans % 0.9 (H) 0.0 - 0.5 %    Neutrophils, Absolute 4.42 2.00 - 8.60 10*3/mm3    Lymphocytes, Absolute 2.21 0.60 - 4.20 10*3/mm3    Monocytes, Absolute 0.86 0.00 - 0.90 10*3/mm3    Eosinophils, Absolute 0.16 0.00 - 0.70 10*3/mm3    Basophils, Absolute 0.06 0.00 - 0.20 10*3/mm3    Immature Grans, Absolute 0.07 (H) 0.00 - 0.02 10*3/mm3   Lavender Top   Result Value Ref Range    Extra Tube hold for add-on    Lavender Top   Result Value Ref Range    Extra Tube hold for add-on    Light Blue Top   Result Value Ref Range    Extra Tube hold for add-on    Troponin    Specimen: Blood   Result Value Ref Range    Troponin I <0.012 <=0.034 ng/mL   Troponin    Specimen: Arm, Left; Blood   Result Value Ref Range    Troponin I <0.012 <=0.034 ng/mL   Troponin    Specimen: Blood   Result Value Ref Range    Troponin I <0.012 <=0.034 ng/mL   aPTT    Specimen: Blood   Result Value Ref Range    PTT 28.0 20.0 - 40.3 seconds   Protime-INR    Specimen: Blood   Result Value Ref Range    Protime 13.2 11.1 - 15.3 Seconds    INR 1.02 0.80 - 1.20   D-dimer, Quantitative    Specimen: Blood   Result Value Ref Range    D-Dimer, Quantitative 369 0 - 470 ng/mL (FEU)   CBC (No Diff)    Specimen: Blood   Result Value Ref Range    WBC 5.64 3.20 - 9.80 10*3/mm3    RBC 4.17 3.77 - 5.16 10*6/mm3    Hemoglobin 12.3 12.0 - 15.5 g/dL    Hematocrit 36.3 35.0 - 45.0 %    MCV 87.1 80.0 - 98.0 fL    MCH 29.5 26.5 - 34.0 pg    MCHC 33.9 31.4 - 36.0 g/dL    RDW 13.1 11.5 - 14.5 %    RDW-SD 41.9 36.4 - 46.3 fl    MPV 10.2 8.0 - 12.0 fL    Platelets 227 150 - 450 10*3/mm3   BNP    Specimen: Blood   Result Value Ref Range    proBNP 138.0 0.0 - 900.0 pg/mL   CK    Specimen: Blood   Result Value Ref Range    Creatine Kinase 106 30 - 135 U/L   Comprehensive Metabolic Panel    Specimen: Blood   Result Value Ref Range     Glucose 93 60 - 100 mg/dL    BUN 39 (H) 7 - 21 mg/dL    Creatinine 1.23 (H) 0.50 - 1.00 mg/dL    Sodium 138 137 - 145 mmol/L    Potassium 4.1 3.5 - 5.1 mmol/L    Chloride 103 95 - 110 mmol/L    CO2 24.0 22.0 - 31.0 mmol/L    Calcium 9.5 8.4 - 10.2 mg/dL    Total Protein 7.5 6.3 - 8.6 g/dL    Albumin 4.10 3.40 - 4.80 g/dL    ALT (SGPT) 36 9 - 52 U/L    AST (SGOT) 35 14 - 36 U/L    Alkaline Phosphatase 69 38 - 126 U/L    Total Bilirubin 0.4 0.2 - 1.3 mg/dL    eGFR Non  Amer 44 (L) 45 - 104 mL/min/1.73    Globulin 3.4 2.3 - 3.5 gm/dL    A/G Ratio 1.2 1.1 - 1.8 g/dL    BUN/Creatinine Ratio 31.7 (H) 7.0 - 25.0    Anion Gap 11.0 5.0 - 15.0 mmol/L   Basic Metabolic Panel    Specimen: Blood   Result Value Ref Range    Glucose 85 60 - 100 mg/dL    BUN 25 (H) 7 - 21 mg/dL    Creatinine 1.05 (H) 0.50 - 1.00 mg/dL    Sodium 139 137 - 145 mmol/L    Potassium 4.6 3.5 - 5.1 mmol/L    Chloride 105 95 - 110 mmol/L    CO2 27.0 22.0 - 31.0 mmol/L    Calcium 8.8 8.4 - 10.2 mg/dL    eGFR Non  Amer 53 45 - 104 mL/min/1.73    BUN/Creatinine Ratio 23.8 7.0 - 25.0    Anion Gap 7.0 5.0 - 15.0 mmol/L     *Note: Due to a large number of results and/or encounters for the requested time period, some results have not been displayed. A complete set of results can be found in Results Review.         This document has been electronically signed by Miguel Fiore MD on October 8, 2022 21:35 CDT

## 2022-11-09 ENCOUNTER — OFFICE VISIT (OUTPATIENT)
Dept: GASTROENTEROLOGY | Facility: CLINIC | Age: 67
End: 2022-11-09

## 2022-11-09 ENCOUNTER — LAB (OUTPATIENT)
Dept: LAB | Facility: HOSPITAL | Age: 67
End: 2022-11-09

## 2022-11-09 VITALS
WEIGHT: 192.6 LBS | HEART RATE: 59 BPM | BODY MASS INDEX: 30.23 KG/M2 | SYSTOLIC BLOOD PRESSURE: 126 MMHG | HEIGHT: 67 IN | DIASTOLIC BLOOD PRESSURE: 84 MMHG

## 2022-11-09 DIAGNOSIS — T78.49XA OTHER ALLERGY, INITIAL ENCOUNTER: ICD-10-CM

## 2022-11-09 DIAGNOSIS — R19.7 DIARRHEA, UNSPECIFIED TYPE: ICD-10-CM

## 2022-11-09 DIAGNOSIS — R10.10 PAIN OF UPPER ABDOMEN: ICD-10-CM

## 2022-11-09 DIAGNOSIS — K76.0 FATTY LIVER: Primary | ICD-10-CM

## 2022-11-09 PROCEDURE — 82785 ASSAY OF IGE: CPT

## 2022-11-09 PROCEDURE — 36415 COLL VENOUS BLD VENIPUNCTURE: CPT

## 2022-11-09 PROCEDURE — 99213 OFFICE O/P EST LOW 20 MIN: CPT | Performed by: INTERNAL MEDICINE

## 2022-11-09 PROCEDURE — 86003 ALLG SPEC IGE CRUDE XTRC EA: CPT

## 2022-11-09 PROCEDURE — 80076 HEPATIC FUNCTION PANEL: CPT | Performed by: INTERNAL MEDICINE

## 2022-11-09 PROCEDURE — 86008 ALLG SPEC IGE RECOMB EA: CPT

## 2022-11-09 RX ORDER — DICYCLOMINE HCL 20 MG
20 TABLET ORAL EVERY 6 HOURS
Qty: 120 TABLET | Refills: 5 | Status: SHIPPED | OUTPATIENT
Start: 2022-11-09 | End: 2022-12-09

## 2022-11-09 RX ORDER — METOPROLOL TARTRATE 100 MG/1
TABLET ORAL
COMMUNITY
Start: 2022-09-23 | End: 2022-11-09 | Stop reason: SDUPTHER

## 2022-11-10 LAB
ALBUMIN SERPL-MCNC: 4.1 G/DL (ref 3.5–5.2)
ALP SERPL-CCNC: 61 U/L (ref 39–117)
ALT SERPL W P-5'-P-CCNC: 15 U/L (ref 1–33)
AST SERPL-CCNC: 30 U/L (ref 1–32)
BILIRUB CONJ SERPL-MCNC: <0.2 MG/DL (ref 0–0.3)
BILIRUB INDIRECT SERPL-MCNC: NORMAL MG/DL
BILIRUB SERPL-MCNC: 0.4 MG/DL (ref 0–1.2)
PROT SERPL-MCNC: 7.4 G/DL (ref 6–8.5)

## 2022-11-14 LAB
ALPHA-GAL IGE QN: <0.1 KU/L
BEEF IGE QN: <0.1 KU/L
CONV CLASS DESCRIPTION: NORMAL
IGE SERPL-ACNC: 28 IU/ML (ref 6–495)
LAMB IGE QN: <0.1 KU/L
PORK IGE QN: <0.1 KU/L

## 2022-11-20 NOTE — PROGRESS NOTES
Chief Complaint   Patient presents with   • Vomiting   • Black or Bloody Stool   • Nausea   • Diarrhea       Subjective    Maude Joyner is a 67 y.o. female.    History of Present Illness  Patient presented to GI clinic for follow-up visit today.  Had an episode of nausea and vomiting with diarrhea 2 weeks ago after eating hamburger meat chili..  Has intermittent abdominal cramping discomfort and has soft stools currently.  Off MiraLAX and Linzess.  Denies rectal bleeding or weight loss.       The following portions of the patient's history were reviewed and updated as appropriate:   Past Medical History:   Diagnosis Date   • Cervical spinal stenosis    • Dysuria    • Fibromyalgia    • GERD (gastroesophageal reflux disease)    • Hyperlipidemia    • Hypertension    • Osteoporosis    • Urinary tract infectious disease      Past Surgical History:   Procedure Laterality Date   • BACK SURGERY     • COLONOSCOPY     • COLONOSCOPY N/A 6/9/2022    Procedure: COLONOSCOPY;  Surgeon: Miguel Fiore MD;  Location: [facility] ENDOSCOPY;  Service: Gastroenterology;  Laterality: N/A;   • ENDOSCOPY N/A 6/9/2022    Procedure: ESOPHAGOGASTRODUODENOSCOPY;  Surgeon: Miguel Fiore MD;  Location: [facility] ENDOSCOPY;  Service: Gastroenterology;  Laterality: N/A;   • HYSTERECTOMY     • KNEE ARTHROPLASTY     • SINUS SURGERY       Family History   Problem Relation Age of Onset   • Heart disease Mother    • Hypertension Mother    • Colon cancer Father    • Cancer Father    • Heart disease Father    • Hypertension Father      OB History    No obstetric history on file.       Prior to Admission medications    Medication Sig Start Date End Date Taking? Authorizing Provider   acetaminophen (TYLENOL) 500 MG tablet Take 1,000 mg by mouth 2 (Two) Times a Day.   Yes ProviderYeimy MD   ascorbic acid (VITAMIN C) 100 MG tablet Take 100 mg by mouth Daily.   Yes ProviderYeimy MD   cholecalciferol (VITAMIN D3) 25 MCG (1000 UT) tablet  Take 2,000 Units by mouth Daily.   Yes Yeimy Bah MD   escitalopram (LEXAPRO) 20 MG tablet Take 20 mg by mouth Daily. 8/6/18  Yes Yeimy Bah MD   fenofibrate (TRICOR) 145 MG tablet Take 145 mg by mouth Daily.   Yes Yeimy Bah MD   irbesartan (AVAPRO) 150 MG tablet Take 1 tablet by mouth Daily. 1/11/19  Yes Robbie Boothe MD   metoprolol succinate XL (TOPROL-XL) 100 MG 24 hr tablet Take 1 tablet by mouth every night at bedtime.  Patient taking differently: 1/2 tablet by mouth in A.M. and 1/2 tablet by mouth in P.M. 1/11/19  Yes Robbie Boothe MD   NIACIN PO Take 500 mg by mouth Daily.   Yes Yeimy Bah MD   omeprazole (priLOSEC) 40 MG capsule Take 1 capsule by mouth Daily. 5/27/22  Yes Miguel Fiore MD   simvastatin (ZOCOR) 20 MG tablet Take 20 mg by mouth Daily. 3/24/22  Yes Yeimy Bah MD   temazepam (RESTORIL) 30 MG capsule Take 30 mg by mouth every night at bedtime. 8/7/18  Yes Yeimy Bah MD   traMADol (ULTRAM) 50 MG tablet Take 50 mg by mouth 2 (Two) Times a Day.   Yes Yeimy Bah MD   triamcinolone (KENALOG) 0.5 % cream Apply 1 application topically 3 (Three) Times a Day.   Yes Yeimy Bah MD   dicyclomine (BENTYL) 20 MG tablet Take 1 tablet by mouth Every 6 (Six) Hours for 30 days. 11/9/22 12/9/22  Miguel Fiore MD     Allergies   Allergen Reactions   • Ace Inhibitors Dizziness   • Carafate [Sucralfate] Other (See Comments)     Abdominal Pain   • Cat Hair Extract Unknown - Low Severity   • Chocolate Flavor Unknown - Low Severity   • Gabapentin Swelling   • Gemfibrozil Swelling   • Morphine Unknown - Low Severity   • Oxycodone Itching   • Strawberry Unknown - Low Severity   • Sulfate Unknown - Low Severity     Other reaction(s): Rash   • Tomato Unknown - Low Severity   • Doxycycline Rash   • Latex Rash   • Morphine Sulfate Rash   • Nickel Rash   • Other Rash     Electrode adhesive- burning and rash     •  "Sulfa Antibiotics Rash   • Tapentadol Rash     Rash     Social History     Socioeconomic History   • Marital status:    Tobacco Use   • Smoking status: Former   • Smokeless tobacco: Never   Vaping Use   • Vaping Use: Never used   Substance and Sexual Activity   • Alcohol use: No   • Drug use: No   • Sexual activity: Defer       Review of Systems  Review of Systems   Constitutional: Negative for chills, fatigue, fever and unexpected weight change.   HENT: Negative for congestion, ear discharge, hearing loss, nosebleeds and sore throat.    Eyes: Negative for pain, discharge and redness.   Respiratory: Negative for cough, chest tightness, shortness of breath and wheezing.    Cardiovascular: Negative for chest pain and palpitations.   Gastrointestinal: Positive for abdominal pain and diarrhea. Negative for abdominal distention, blood in stool, constipation, nausea and vomiting.   Endocrine: Negative for cold intolerance, polydipsia, polyphagia and polyuria.   Genitourinary: Negative for dysuria, flank pain, frequency, hematuria and urgency.   Musculoskeletal: Negative for arthralgias, back pain, joint swelling and myalgias.   Skin: Negative for color change, pallor and rash.   Neurological: Negative for tremors, seizures, syncope, weakness and headaches.   Hematological: Negative for adenopathy. Does not bruise/bleed easily.   Psychiatric/Behavioral: Negative for behavioral problems, confusion, dysphoric mood, hallucinations and suicidal ideas. The patient is not nervous/anxious.         /84 (BP Location: Left arm)   Pulse 59   Ht 170.2 cm (67\")   Wt 87.4 kg (192 lb 9.6 oz)   LMP 05/01/2004 (Approximate)   BMI 30.17 kg/m²     Objective    Physical Exam  Constitutional:       Appearance: She is well-developed.   HENT:      Head: Normocephalic and atraumatic.   Eyes:      Conjunctiva/sclera: Conjunctivae normal.      Pupils: Pupils are equal, round, and reactive to light.   Neck:      Thyroid: No " thyromegaly.   Cardiovascular:      Rate and Rhythm: Normal rate and regular rhythm.      Heart sounds: Normal heart sounds. No murmur heard.  Pulmonary:      Effort: Pulmonary effort is normal.      Breath sounds: Normal breath sounds. No wheezing.   Abdominal:      General: Bowel sounds are normal. There is no distension.      Palpations: Abdomen is soft. There is no mass.      Tenderness: There is no abdominal tenderness.      Hernia: No hernia is present.   Genitourinary:     Comments: No lesions noted  Musculoskeletal:         General: No tenderness. Normal range of motion.      Cervical back: Normal range of motion and neck supple.   Lymphadenopathy:      Cervical: No cervical adenopathy.   Skin:     General: Skin is warm and dry.      Findings: No rash.   Neurological:      Mental Status: She is alert and oriented to person, place, and time.      Cranial Nerves: No cranial nerve deficit.   Psychiatric:         Thought Content: Thought content normal.       Lab on 08/23/2022   Component Date Value Ref Range Status   • BUN 08/23/2022 33 (H)  8 - 23 mg/dL Final   • Creatinine 08/23/2022 1.22 (H)  0.57 - 1.00 mg/dL Final   • eGFR 08/23/2022 48.1 (L)  >60.0 mL/min/1.73 Final    National Kidney Foundation and American Society of Nephrology (ASN) Task Force recommended calculation based on the Chronic Kidney Disease Epidemiology Collaboration (CKD-EPI) equation refit without adjustment for race.     Assessment & Plan      1. Fatty liver    2. Pain of upper abdomen    3. Diarrhea, unspecified type    4. Other allergy, initial encounter    1.  Abdominal pain with diarrhea, add Benefiber and Bentyl.  Also add probiotics if continues.  2.  GERD, well controlled with PPI.  Continue PPI and antireflux lifestyle.  3.  Colon polyps and family history of colon cancer, repeat colonoscopy in 3 years.  4.  Diverticulosis, continue high-fiber diet.  5.  Obesity, recommend exercise and diet control.  7.  Fatty liver disease,  obtain LFTs in next visit.       Orders placed during this encounter include:  Orders Placed This Encounter   Procedures   • Hepatic Function Panel     Order Specific Question:   Release to patient     Answer:   Routine Release   • Alpha-Gal IgE Panel     Standing Status:   Future     Number of Occurrences:   1     Standing Expiration Date:   11/9/2023     Order Specific Question:   Release to patient     Answer:   Routine Release       * Surgery not found *    Review and/or summary of lab tests, radiology, procedures, medications. Review and summary of old records and obtaining of history. The risks and benefits of my recommendations, as well as other treatment options were discussed with the patient today. Questions were answered.    New Medications Ordered This Visit   Medications   • dicyclomine (BENTYL) 20 MG tablet     Sig: Take 1 tablet by mouth Every 6 (Six) Hours for 30 days.     Dispense:  120 tablet     Refill:  5       Follow-up: Return in about 2 years (around 11/9/2024).               Results for orders placed or performed in visit on 11/09/22   Alpha-Gal IgE Panel    Specimen: Blood   Result Value Ref Range    Class Description Comment     IgE 28 6 - 495 IU/mL    [id number] Alpha-Gal <0.10 Class 0 kU/L    Beef <0.10 Class 0 kU/L    Pork <0.10 Class 0 kU/L    Lamb <0.10 Class 0 kU/L   Results for orders placed or performed in visit on 11/09/22   Hepatic Function Panel    Specimen: Blood   Result Value Ref Range    Total Protein 7.4 6.0 - 8.5 g/dL    Albumin 4.10 3.50 - 5.20 g/dL    ALT (SGPT) 15 1 - 33 U/L    AST (SGOT) 30 1 - 32 U/L    Alkaline Phosphatase 61 39 - 117 U/L    Total Bilirubin 0.4 0.0 - 1.2 mg/dL    Bilirubin, Direct <0.2 0.0 - 0.3 mg/dL    Bilirubin, Indirect     Results for orders placed or performed in visit on 08/23/22   BUN    Specimen: Blood   Result Value Ref Range    BUN 33 (H) 8 - 23 mg/dL   Creatinine, Serum    Specimen: Blood   Result Value Ref Range    Creatinine 1.22 (H) 0.57  - 1.00 mg/dL    eGFR 48.1 (L) >60.0 mL/min/1.73   Results for orders placed or performed in visit on 22   Hepatitis Panel, Acute    Specimen: Blood   Result Value Ref Range    Hepatitis B Surface Ag Non-Reactive Non-Reactive    Hep A IgM Non-Reactive Non-Reactive    Hep B C IgM Non-Reactive Non-Reactive    Hepatitis C Ab Non-Reactive Non-Reactive   Comprehensive Metabolic Panel    Specimen: Blood   Result Value Ref Range    Glucose 100 (H) 65 - 99 mg/dL    BUN 27 (H) 8 - 23 mg/dL    Creatinine 1.42 (H) 0.57 - 1.00 mg/dL    Sodium 140 136 - 145 mmol/L    Potassium 4.5 3.5 - 5.2 mmol/L    Chloride 104 98 - 107 mmol/L    CO2 26.0 22.0 - 29.0 mmol/L    Calcium 9.9 8.6 - 10.5 mg/dL    Total Protein 7.3 6.0 - 8.5 g/dL    Albumin 4.70 3.50 - 5.20 g/dL    ALT (SGPT) 25 1 - 33 U/L    AST (SGOT) 33 (H) 1 - 32 U/L    Alkaline Phosphatase 69 39 - 117 U/L    Total Bilirubin 0.6 0.0 - 1.2 mg/dL    Globulin 2.6 gm/dL    A/G Ratio 1.8 g/dL    BUN/Creatinine Ratio 19.0 7.0 - 25.0    Anion Gap 10.0 5.0 - 15.0 mmol/L    eGFR 40.1 (L) >60.0 mL/min/1.73   Results for orders placed or performed during the hospital encounter of 22   TISSUE EXAM, [facility] ([facility])    Specimen: A: Small Intestine, Duodenum; Tissue    B: Gastric, Antrum; Tissue    C: Gastric, Body; Tissue    D: Esophagus; Tissue    E: Large Intestine, Transverse Colon; Tissue    F: Large Intestine, Sigmoid Colon; Tissue   Result Value Ref Range    Reference Lab Report       [facility]  [address] [place] [address]  Phone: [phone] or [phone]  Fax: [phone]  Pete Gonzalez M.D., Medical Director    PATIENT NAME                           LABORATORY NO.  [id number]                      [id number]  [id number]                         AGE              SEX  SSN           CLIENT REF #  [facility]           67      1955  F    [id number]   [id number]    [place]                "        REQUESTING M.D.     ATTENDING [facility].     COPY TO.  [address] [place] [address]                 EZ[place] [address]             TOMI  DATE COLLECTED      DATE RECEIVED      DATE REPORTED  06/09/2022          06/10/2022         06/13/2022    DIAGNOSIS:  A.   DUODENUM, BIOPSY:  No significant histologic abnormality  B.   ANTRUM, BIOPSY:  Reactive gastropathy  C.   GASTRIC POLYPS:  Fundic gland polyps  D.   GE JUNCTION:  Reactive gastric and squamous mucosa  Negative  for specialized Alcantar's mucosa  E.   TRANSVERSE COLON POLYP:  Tubular adenoma  F.   SIGMOID COLON POLYP:  Tubular adenoma    JBS/pah    CLINICAL HISTORY:  Generalized abdominal pain  Nausea  Other constipation    SPECIMENS RECEIVED:  A.  DUODENUM, BIOPSY  B.  ANTRUM, BIOPSY  C.  GASTRIC POLYPS  D.  GE JUNCTION  E.  TRANSVERSE COLON POLYP  F.  SIGMOID COLON POLYP    MICROSCOPIC DESCRIPTION:  Tissue blocks are prepared and slides are examined microscopically on all  specimens. See diagnosis for details.    Professional interpretation rendered by Sp De M.D. at [facility],  [employer], [address] [place] [address].    GROSS DESCRIPTION:  A.  Specimen is received in 1 formalin filled container \"duodenal biopsy\"  consists of a single piece of tan soft tissue measuring 0.3 x 0.2 x 0.2 cm.  Specimen is submitted entirely in 1 cassette.  B.  Specimen is received in 1 formalin filled container \"antrum biopsy\" and  consists of 2 pieces of tan soft tissue measuring 0.3 x 0.2 x  0.2 cm.  Specimen is submitted entirely in 1 cassette.  C.  Specimen is received in 1 formalin filled container \"gastric polyps\" and  consists of 2 pieces of tan soft tissue measuring 0.4 x 0.2 x 0.1 cm.  Specimen is submitted entirely in 1 cassette.  D.    Specimen is received in 1 formalin filled container \"GE junction\" and  consists of a single piece of tan soft tissue measuring 0.3 x 0.3 x 0.1 cm.  Specimen is submitted entirely in " "1 cassette.  E.  Specimen is received in 1 formalin filled container \"transverse polyp\" and  consists of 3 pieces of tan soft tissue measuring 0.5 x 0.3 x 0.2 cm.  Specimen is submitted entirely in 1 cassette.  F.  Specimen is received in 1 formalin filled container \"sigmoid polyp snare\"  and consists of 2 pieces of tan soft tissue measuring 0.4 x 0.3 x 0.2 cm.  Specimen is submitted entirely in 1 cassette.  MM    REVIEWED, DIAGNOSED AND ELECTRONICALLY  SIGNED BY:    Sp De M.D.  CPT CODES:  88305x6     Results for orders placed or performed during the hospital encounter of 06/04/18   Tilt Table   Result Value Ref Range    Target HR (85%) 132 bpm    Max. Pred. HR (100%) 155 bpm   Results for orders placed or performed during the hospital encounter of 05/24/18   Gold Top - SST   Result Value Ref Range    Extra Tube Hold for add-ons.    Green Top (Gel)   Result Value Ref Range    Extra Tube Hold for add-ons.    CK-MB    Specimen: Blood   Result Value Ref Range    CKMB 1.78 0.00 - 5.00 ng/mL   CBC Auto Differential    Specimen: Blood   Result Value Ref Range    WBC 7.78 3.20 - 9.80 10*3/mm3    RBC 4.59 3.77 - 5.16 10*6/mm3    Hemoglobin 13.7 12.0 - 15.5 g/dL    Hematocrit 40.0 35.0 - 45.0 %    MCV 87.1 80.0 - 98.0 fL    MCH 29.8 26.5 - 34.0 pg    MCHC 34.3 31.4 - 36.0 g/dL    RDW 13.3 11.5 - 14.5 %    RDW-SD 42.4 36.4 - 46.3 fl    MPV 10.5 8.0 - 12.0 fL    Platelets 301 150 - 450 10*3/mm3    Neutrophil % 56.7 37.0 - 80.0 %    Lymphocyte % 28.4 10.0 - 50.0 %    Monocyte % 11.1 0.0 - 12.0 %    Eosinophil % 2.1 0.0 - 7.0 %    Basophil % 0.8 0.0 - 2.0 %    Immature Grans % 0.9 (H) 0.0 - 0.5 %    Neutrophils, Absolute 4.42 2.00 - 8.60 10*3/mm3    Lymphocytes, Absolute 2.21 0.60 - 4.20 10*3/mm3    Monocytes, Absolute 0.86 0.00 - 0.90 10*3/mm3    Eosinophils, Absolute 0.16 0.00 - 0.70 10*3/mm3    Basophils, Absolute 0.06 0.00 - 0.20 10*3/mm3    Immature Grans, Absolute 0.07 (H) 0.00 - 0.02 10*3/mm3   Lavender Top "   Result Value Ref Range    Extra Tube hold for add-on    Lavender Top   Result Value Ref Range    Extra Tube hold for add-on    Light Blue Top   Result Value Ref Range    Extra Tube hold for add-on    Troponin    Specimen: Blood   Result Value Ref Range    Troponin I <0.012 <=0.034 ng/mL   Troponin    Specimen: Arm, Left; Blood   Result Value Ref Range    Troponin I <0.012 <=0.034 ng/mL   Troponin    Specimen: Blood   Result Value Ref Range    Troponin I <0.012 <=0.034 ng/mL   aPTT    Specimen: Blood   Result Value Ref Range    PTT 28.0 20.0 - 40.3 seconds   Protime-INR    Specimen: Blood   Result Value Ref Range    Protime 13.2 11.1 - 15.3 Seconds    INR 1.02 0.80 - 1.20   D-dimer, Quantitative    Specimen: Blood   Result Value Ref Range    D-Dimer, Quantitative 369 0 - 470 ng/mL (FEU)   CBC (No Diff)    Specimen: Blood   Result Value Ref Range    WBC 5.64 3.20 - 9.80 10*3/mm3    RBC 4.17 3.77 - 5.16 10*6/mm3    Hemoglobin 12.3 12.0 - 15.5 g/dL    Hematocrit 36.3 35.0 - 45.0 %    MCV 87.1 80.0 - 98.0 fL    MCH 29.5 26.5 - 34.0 pg    MCHC 33.9 31.4 - 36.0 g/dL    RDW 13.1 11.5 - 14.5 %    RDW-SD 41.9 36.4 - 46.3 fl    MPV 10.2 8.0 - 12.0 fL    Platelets 227 150 - 450 10*3/mm3   BNP    Specimen: Blood   Result Value Ref Range    proBNP 138.0 0.0 - 900.0 pg/mL   CK    Specimen: Blood   Result Value Ref Range    Creatine Kinase 106 30 - 135 U/L   Comprehensive Metabolic Panel    Specimen: Blood   Result Value Ref Range    Glucose 93 60 - 100 mg/dL    BUN 39 (H) 7 - 21 mg/dL    Creatinine 1.23 (H) 0.50 - 1.00 mg/dL    Sodium 138 137 - 145 mmol/L    Potassium 4.1 3.5 - 5.1 mmol/L    Chloride 103 95 - 110 mmol/L    CO2 24.0 22.0 - 31.0 mmol/L    Calcium 9.5 8.4 - 10.2 mg/dL    Total Protein 7.5 6.3 - 8.6 g/dL    Albumin 4.10 3.40 - 4.80 g/dL    ALT (SGPT) 36 9 - 52 U/L    AST (SGOT) 35 14 - 36 U/L    Alkaline Phosphatase 69 38 - 126 U/L    Total Bilirubin 0.4 0.2 - 1.3 mg/dL    eGFR Non  Amer 44 (L) 45 - 104  mL/min/1.73    Globulin 3.4 2.3 - 3.5 gm/dL    A/G Ratio 1.2 1.1 - 1.8 g/dL    BUN/Creatinine Ratio 31.7 (H) 7.0 - 25.0    Anion Gap 11.0 5.0 - 15.0 mmol/L     *Note: Due to a large number of results and/or encounters for the requested time period, some results have not been displayed. A complete set of results can be found in Results Review.         This document has been electronically signed by Miguel Fiore MD on November 20, 2022 07:46 CST

## 2022-11-28 ENCOUNTER — OFFICE VISIT (OUTPATIENT)
Dept: GASTROENTEROLOGY | Facility: CLINIC | Age: 67
End: 2022-11-28

## 2022-11-28 VITALS
SYSTOLIC BLOOD PRESSURE: 153 MMHG | WEIGHT: 197.8 LBS | BODY MASS INDEX: 31.04 KG/M2 | DIASTOLIC BLOOD PRESSURE: 81 MMHG | HEART RATE: 59 BPM | HEIGHT: 67 IN

## 2022-11-28 DIAGNOSIS — K76.0 FATTY LIVER: ICD-10-CM

## 2022-11-28 DIAGNOSIS — K21.9 GASTROESOPHAGEAL REFLUX DISEASE, UNSPECIFIED WHETHER ESOPHAGITIS PRESENT: Primary | ICD-10-CM

## 2022-11-28 PROCEDURE — 99213 OFFICE O/P EST LOW 20 MIN: CPT | Performed by: INTERNAL MEDICINE

## 2022-12-12 NOTE — PROGRESS NOTES
Chief Complaint   Patient presents with   • 2 week follow up       Subjective    Maude Joyner is a 67 y.o. female.    History of Present Illness  Patient presented to GI clinic for follow-up visit today.  Feels better currently.  Abdominal pain is improving.  Denies nausea or vomiting.  Constipation is well controlled with Linzess.  Denies rectal bleeding.  Weight is stable.  GERD is well controlled with Prilosec.  LFTs are normal.  Alpha gal panel was unremarkable.       The following portions of the patient's history were reviewed and updated as appropriate:   Past Medical History:   Diagnosis Date   • Cervical spinal stenosis    • Dysuria    • Fibromyalgia    • GERD (gastroesophageal reflux disease)    • Hyperlipidemia    • Hypertension    • Osteoporosis    • Urinary tract infectious disease      Past Surgical History:   Procedure Laterality Date   • BACK SURGERY     • COLONOSCOPY     • COLONOSCOPY N/A 6/9/2022    Procedure: COLONOSCOPY;  Surgeon: Miguel Fiore MD;  Location: [facility] ENDOSCOPY;  Service: Gastroenterology;  Laterality: N/A;   • ENDOSCOPY N/A 6/9/2022    Procedure: ESOPHAGOGASTRODUODENOSCOPY;  Surgeon: Miguel Fiore MD;  Location: [facility] ENDOSCOPY;  Service: Gastroenterology;  Laterality: N/A;   • HYSTERECTOMY     • KNEE ARTHROPLASTY     • SINUS SURGERY       Family History   Problem Relation Age of Onset   • Heart disease Mother    • Hypertension Mother    • Colon cancer Father    • Cancer Father    • Heart disease Father    • Hypertension Father      OB History    No obstetric history on file.       Prior to Admission medications    Medication Sig Start Date End Date Taking? Authorizing Provider   acetaminophen (TYLENOL) 500 MG tablet Take 1,000 mg by mouth 2 (Two) Times a Day.   Yes Yeimy Bah MD   ascorbic acid (VITAMIN C) 100 MG tablet Take 100 mg by mouth Daily.   Yes Yeimy Bah MD   cholecalciferol (VITAMIN D3) 25 MCG (1000 UT) tablet Take 2,000 Units by  mouth Daily.   Yes Yeimy Bah MD   escitalopram (LEXAPRO) 20 MG tablet Take 20 mg by mouth Daily. 8/6/18  Yes Yeimy Bah MD   fenofibrate (TRICOR) 145 MG tablet Take 145 mg by mouth Daily.   Yes Yeimy Bah MD   irbesartan (AVAPRO) 150 MG tablet Take 1 tablet by mouth Daily. 1/11/19  Yes Robbie Boothe MD   metoprolol succinate XL (TOPROL-XL) 100 MG 24 hr tablet Take 1 tablet by mouth every night at bedtime.  Patient taking differently: 1/2 tablet by mouth in A.M. and 1/2 tablet by mouth in P.M. 1/11/19  Yes Robbie Boothe MD   NIACIN PO Take 500 mg by mouth Daily.   Yes Yeimy Bah MD   omeprazole (priLOSEC) 40 MG capsule Take 1 capsule by mouth Daily. 5/27/22  Yes Miguel Fiore MD   simvastatin (ZOCOR) 20 MG tablet Take 20 mg by mouth Daily. 3/24/22  Yes Yeimy Bah MD   temazepam (RESTORIL) 30 MG capsule Take 30 mg by mouth every night at bedtime. 8/7/18  Yes Yeimy Bah MD   traMADol (ULTRAM) 50 MG tablet Take 50 mg by mouth 2 (Two) Times a Day.   Yes Yeimy Bah MD   triamcinolone (KENALOG) 0.5 % cream Apply 1 application topically 3 (Three) Times a Day.   Yes Yeimy Bah MD   linaclotide (LINZESS) 72 MCG capsule capsule Take 1 capsule by mouth Every Morning Before Breakfast for 30 days. 11/28/22 12/28/22  Miguel Fiore MD     Allergies   Allergen Reactions   • Ace Inhibitors Dizziness   • Carafate [Sucralfate] Other (See Comments)     Abdominal Pain   • Cat Hair Extract Unknown - Low Severity   • Chocolate Flavor Unknown - Low Severity   • Gabapentin Swelling   • Gemfibrozil Swelling   • Morphine Unknown - Low Severity   • Oxycodone Itching   • Strawberry Unknown - Low Severity   • Sulfate Unknown - Low Severity     Other reaction(s): Rash   • Tomato Unknown - Low Severity   • Doxycycline Rash   • Latex Rash   • Morphine Sulfate Rash   • Nickel Rash   • Other Rash     Electrode adhesive- burning and rash    "  • Sulfa Antibiotics Rash   • Tapentadol Rash     Rash     Social History     Socioeconomic History   • Marital status:    Tobacco Use   • Smoking status: Former   • Smokeless tobacco: Never   Vaping Use   • Vaping Use: Never used   Substance and Sexual Activity   • Alcohol use: No   • Drug use: No   • Sexual activity: Defer       Review of Systems  Review of Systems   Constitutional: Negative for chills, fatigue, fever and unexpected weight change.   HENT: Negative for congestion, ear discharge, hearing loss, nosebleeds and sore throat.    Eyes: Negative for pain, discharge and redness.   Respiratory: Negative for cough, chest tightness, shortness of breath and wheezing.    Cardiovascular: Negative for chest pain and palpitations.   Gastrointestinal: Positive for abdominal pain and constipation. Negative for abdominal distention, blood in stool, diarrhea, nausea and vomiting.   Endocrine: Negative for cold intolerance, polydipsia, polyphagia and polyuria.   Genitourinary: Negative for dysuria, flank pain, frequency, hematuria and urgency.   Musculoskeletal: Negative for arthralgias, back pain, joint swelling and myalgias.   Skin: Negative for color change, pallor and rash.   Neurological: Negative for tremors, seizures, syncope, weakness and headaches.   Hematological: Negative for adenopathy. Does not bruise/bleed easily.   Psychiatric/Behavioral: Negative for behavioral problems, confusion, dysphoric mood, hallucinations and suicidal ideas. The patient is not nervous/anxious.         /81 (BP Location: Left arm)   Pulse 59   Ht 170.2 cm (67\")   Wt 89.7 kg (197 lb 12.8 oz)   LMP 05/01/2004 (Approximate)   BMI 30.98 kg/m²     Objective    Physical Exam  Constitutional:       Appearance: She is well-developed.   HENT:      Head: Normocephalic and atraumatic.   Eyes:      Conjunctiva/sclera: Conjunctivae normal.      Pupils: Pupils are equal, round, and reactive to light.   Neck:      Thyroid: No " thyromegaly.   Cardiovascular:      Rate and Rhythm: Normal rate and regular rhythm.      Heart sounds: Normal heart sounds. No murmur heard.  Pulmonary:      Effort: Pulmonary effort is normal.      Breath sounds: Normal breath sounds. No wheezing.   Abdominal:      General: Bowel sounds are normal. There is no distension.      Palpations: Abdomen is soft. There is no mass.      Tenderness: There is no abdominal tenderness.      Hernia: No hernia is present.   Genitourinary:     Comments: No lesions noted  Musculoskeletal:         General: No tenderness. Normal range of motion.      Cervical back: Normal range of motion and neck supple.   Lymphadenopathy:      Cervical: No cervical adenopathy.   Skin:     General: Skin is warm and dry.      Findings: No rash.   Neurological:      Mental Status: She is alert and oriented to person, place, and time.      Cranial Nerves: No cranial nerve deficit.   Psychiatric:         Thought Content: Thought content normal.       Lab on 11/09/2022   Component Date Value Ref Range Status   • Class Description 11/09/2022 Comment   Final        Levels of Specific IgE       Class  Description of Class      ---------------------------  -----  --------------------                     < 0.10         0         Negative             0.10 -    0.31         0/I       Equivocal/Low             0.32 -    0.55         I         Low             0.56 -    1.40         II        Moderate             1.41 -    3.90         III       High             3.91 -   19.00         IV        Very High            19.01 -  100.00         V         Very High                    >100.00         VI        Very High   • IgE 11/09/2022 28  6 - 495 IU/mL Final   • [id number] Alpha-Gal 11/09/2022 <0.10  Class 0 kU/L Final   • Beef 11/09/2022 <0.10  Class 0 kU/L Final   • Pork 11/09/2022 <0.10  Class 0 kU/L Final   • Ware 11/09/2022 <0.10  Class 0 kU/L Final     Assessment & Plan    No diagnosis found..   1.  Abdominal pain  with constipation, likely IBS.  Continue Linzess.  Add high-fiber diet.  2.  GERD, well controlled with PPI.  Continue PPI and antireflux lifestyle.  3.  Colon polyps and family history of colon cancer, repeat colonoscopy in 3 years.  4.  Diverticulosis, continue high-fiber diet.  5.  Fatty liver disease, LFTs are normal.  Recommend exercise and diet control.  6.  Obesity, recommend exercise and diet control.    Orders placed during this encounter include:  No orders of the defined types were placed in this encounter.      * Surgery not found *    Review and/or summary of lab tests, radiology, procedures, medications. Review and summary of old records and obtaining of history. The risks and benefits of my recommendations, as well as other treatment options were discussed with the patient today. Questions were answered.    New Medications Ordered This Visit   Medications   • linaclotide (LINZESS) 72 MCG capsule capsule     Sig: Take 1 capsule by mouth Every Morning Before Breakfast for 30 days.     Dispense:  30 capsule     Refill:  5       Follow-up: Return in about 1 month (around 12/28/2022).               Results for orders placed or performed in visit on 11/09/22   Alpha-Gal IgE Panel    Specimen: Blood   Result Value Ref Range    Class Description Comment     IgE 28 6 - 495 IU/mL    [id number] Alpha-Gal <0.10 Class 0 kU/L    Beef <0.10 Class 0 kU/L    Pork <0.10 Class 0 kU/L    Lamb <0.10 Class 0 kU/L   Results for orders placed or performed in visit on 11/09/22   Hepatic Function Panel    Specimen: Blood   Result Value Ref Range    Total Protein 7.4 6.0 - 8.5 g/dL    Albumin 4.10 3.50 - 5.20 g/dL    ALT (SGPT) 15 1 - 33 U/L    AST (SGOT) 30 1 - 32 U/L    Alkaline Phosphatase 61 39 - 117 U/L    Total Bilirubin 0.4 0.0 - 1.2 mg/dL    Bilirubin, Direct <0.2 0.0 - 0.3 mg/dL    Bilirubin, Indirect     Results for orders placed or performed in visit on 08/23/22   BUN    Specimen: Blood   Result Value Ref Range    BUN  33 (H) 8 - 23 mg/dL   Creatinine, Serum    Specimen: Blood   Result Value Ref Range    Creatinine 1.22 (H) 0.57 - 1.00 mg/dL    eGFR 48.1 (L) >60.0 mL/min/1.73   Results for orders placed or performed in visit on 22   Hepatitis Panel, Acute    Specimen: Blood   Result Value Ref Range    Hepatitis B Surface Ag Non-Reactive Non-Reactive    Hep A IgM Non-Reactive Non-Reactive    Hep B C IgM Non-Reactive Non-Reactive    Hepatitis C Ab Non-Reactive Non-Reactive   Comprehensive Metabolic Panel    Specimen: Blood   Result Value Ref Range    Glucose 100 (H) 65 - 99 mg/dL    BUN 27 (H) 8 - 23 mg/dL    Creatinine 1.42 (H) 0.57 - 1.00 mg/dL    Sodium 140 136 - 145 mmol/L    Potassium 4.5 3.5 - 5.2 mmol/L    Chloride 104 98 - 107 mmol/L    CO2 26.0 22.0 - 29.0 mmol/L    Calcium 9.9 8.6 - 10.5 mg/dL    Total Protein 7.3 6.0 - 8.5 g/dL    Albumin 4.70 3.50 - 5.20 g/dL    ALT (SGPT) 25 1 - 33 U/L    AST (SGOT) 33 (H) 1 - 32 U/L    Alkaline Phosphatase 69 39 - 117 U/L    Total Bilirubin 0.6 0.0 - 1.2 mg/dL    Globulin 2.6 gm/dL    A/G Ratio 1.8 g/dL    BUN/Creatinine Ratio 19.0 7.0 - 25.0    Anion Gap 10.0 5.0 - 15.0 mmol/L    eGFR 40.1 (L) >60.0 mL/min/1.73   Results for orders placed or performed during the hospital encounter of 22   TISSUE EXAM, [facility] ([facility])    Specimen: A: Small Intestine, Duodenum; Tissue    B: Gastric, Antrum; Tissue    C: Gastric, Body; Tissue    D: Esophagus; Tissue    E: Large Intestine, Transverse Colon; Tissue    F: Large Intestine, Sigmoid Colon; Tissue   Result Value Ref Range    Reference Lab Report       [facility]  [address] [place] [address]  Phone: [phone] or [phone]  Fax: [phone]  Pete Gonzalez M.D., Medical Director    PATIENT NAME                           LABORATORY NO.  [id number]                      [id number]  [id number]                         AGE              SEX  SSN           CLIENT REF  "#  [facility]           67      1955  F    [id number]   [id number]    [place]                       REQUESTING M.D.     ATTENDING MLUIS.     COPY TO.  [address] [place] [address]                 [place] [address]             BRIANNEA  DATE COLLECTED      DATE RECEIVED      DATE REPORTED  06/09/2022          06/10/2022         06/13/2022    DIAGNOSIS:  A.   DUODENUM, BIOPSY:  No significant histologic abnormality  B.   ANTRUM, BIOPSY:  Reactive gastropathy  C.   GASTRIC POLYPS:  Fundic gland polyps  D.   GE JUNCTION:  Reactive gastric and squamous mucosa  Negative  for specialized Alcantar's mucosa  E.   TRANSVERSE COLON POLYP:  Tubular adenoma  F.   SIGMOID COLON POLYP:  Tubular adenoma    JBS/pah    CLINICAL HISTORY:  Generalized abdominal pain  Nausea  Other constipation    SPECIMENS RECEIVED:  A.  DUODENUM, BIOPSY  B.  ANTRUM, BIOPSY  C.  GASTRIC POLYPS  D.  GE JUNCTION  E.  TRANSVERSE COLON POLYP  F.  SIGMOID COLON POLYP    MICROSCOPIC DESCRIPTION:  Tissue blocks are prepared and slides are examined microscopically on all  specimens. See diagnosis for details.    Professional interpretation rendered by Sp De M.D. at [facility], [address] [place] [address].    GROSS DESCRIPTION:  A.  Specimen is received in 1 formalin filled container \"duodenal biopsy\"  consists of a single piece of tan soft tissue measuring 0.3 x 0.2 x 0.2 cm.  Specimen is submitted entirely in 1 cassette.  B.  Specimen is received in 1 formalin filled container \"antrum biopsy\" and  consists of 2 pieces of tan soft tissue measuring 0.3 x 0.2 x  0.2 cm.  Specimen is submitted entirely in 1 cassette.  C.  Specimen is received in 1 formalin filled container \"gastric polyps\" and  consists of 2 pieces of tan soft tissue measuring 0.4 x 0.2 x 0.1 cm.  Specimen is submitted entirely in 1 cassette.  D.    Specimen is received in 1 formalin filled container \"GE " "junction\" and  consists of a single piece of tan soft tissue measuring 0.3 x 0.3 x 0.1 cm.  Specimen is submitted entirely in 1 cassette.  E.  Specimen is received in 1 formalin filled container \"transverse polyp\" and  consists of 3 pieces of tan soft tissue measuring 0.5 x 0.3 x 0.2 cm.  Specimen is submitted entirely in 1 cassette.  F.  Specimen is received in 1 formalin filled container \"sigmoid polyp snare\"  and consists of 2 pieces of tan soft tissue measuring 0.4 x 0.3 x 0.2 cm.  Specimen is submitted entirely in 1 cassette.  MM    REVIEWED, DIAGNOSED AND ELECTRONICALLY  SIGNED BY:    Sp De M.D.  CPT CODES:  88305x6     Results for orders placed or performed during the hospital encounter of 06/04/18   Tilt Table   Result Value Ref Range    Target HR (85%) 132 bpm    Max. Pred. HR (100%) 155 bpm   Results for orders placed or performed during the hospital encounter of 05/24/18   Gold Top - SST   Result Value Ref Range    Extra Tube Hold for add-ons.    Green Top (Gel)   Result Value Ref Range    Extra Tube Hold for add-ons.    CK-MB    Specimen: Blood   Result Value Ref Range    CKMB 1.78 0.00 - 5.00 ng/mL   CBC Auto Differential    Specimen: Blood   Result Value Ref Range    WBC 7.78 3.20 - 9.80 10*3/mm3    RBC 4.59 3.77 - 5.16 10*6/mm3    Hemoglobin 13.7 12.0 - 15.5 g/dL    Hematocrit 40.0 35.0 - 45.0 %    MCV 87.1 80.0 - 98.0 fL    MCH 29.8 26.5 - 34.0 pg    MCHC 34.3 31.4 - 36.0 g/dL    RDW 13.3 11.5 - 14.5 %    RDW-SD 42.4 36.4 - 46.3 fl    MPV 10.5 8.0 - 12.0 fL    Platelets 301 150 - 450 10*3/mm3    Neutrophil % 56.7 37.0 - 80.0 %    Lymphocyte % 28.4 10.0 - 50.0 %    Monocyte % 11.1 0.0 - 12.0 %    Eosinophil % 2.1 0.0 - 7.0 %    Basophil % 0.8 0.0 - 2.0 %    Immature Grans % 0.9 (H) 0.0 - 0.5 %    Neutrophils, Absolute 4.42 2.00 - 8.60 10*3/mm3    Lymphocytes, Absolute 2.21 0.60 - 4.20 10*3/mm3    Monocytes, Absolute 0.86 0.00 - 0.90 10*3/mm3    Eosinophils, Absolute 0.16 0.00 - 0.70 " 10*3/mm3    Basophils, Absolute 0.06 0.00 - 0.20 10*3/mm3    Immature Grans, Absolute 0.07 (H) 0.00 - 0.02 10*3/mm3   Lavender Top   Result Value Ref Range    Extra Tube hold for add-on    Lavender Top   Result Value Ref Range    Extra Tube hold for add-on    Light Blue Top   Result Value Ref Range    Extra Tube hold for add-on    Troponin    Specimen: Blood   Result Value Ref Range    Troponin I <0.012 <=0.034 ng/mL   Troponin    Specimen: Arm, Left; Blood   Result Value Ref Range    Troponin I <0.012 <=0.034 ng/mL   Troponin    Specimen: Blood   Result Value Ref Range    Troponin I <0.012 <=0.034 ng/mL   aPTT    Specimen: Blood   Result Value Ref Range    PTT 28.0 20.0 - 40.3 seconds   Protime-INR    Specimen: Blood   Result Value Ref Range    Protime 13.2 11.1 - 15.3 Seconds    INR 1.02 0.80 - 1.20   D-dimer, Quantitative    Specimen: Blood   Result Value Ref Range    D-Dimer, Quantitative 369 0 - 470 ng/mL (FEU)   CBC (No Diff)    Specimen: Blood   Result Value Ref Range    WBC 5.64 3.20 - 9.80 10*3/mm3    RBC 4.17 3.77 - 5.16 10*6/mm3    Hemoglobin 12.3 12.0 - 15.5 g/dL    Hematocrit 36.3 35.0 - 45.0 %    MCV 87.1 80.0 - 98.0 fL    MCH 29.5 26.5 - 34.0 pg    MCHC 33.9 31.4 - 36.0 g/dL    RDW 13.1 11.5 - 14.5 %    RDW-SD 41.9 36.4 - 46.3 fl    MPV 10.2 8.0 - 12.0 fL    Platelets 227 150 - 450 10*3/mm3   BNP    Specimen: Blood   Result Value Ref Range    proBNP 138.0 0.0 - 900.0 pg/mL   CK    Specimen: Blood   Result Value Ref Range    Creatine Kinase 106 30 - 135 U/L   Comprehensive Metabolic Panel    Specimen: Blood   Result Value Ref Range    Glucose 93 60 - 100 mg/dL    BUN 39 (H) 7 - 21 mg/dL    Creatinine 1.23 (H) 0.50 - 1.00 mg/dL    Sodium 138 137 - 145 mmol/L    Potassium 4.1 3.5 - 5.1 mmol/L    Chloride 103 95 - 110 mmol/L    CO2 24.0 22.0 - 31.0 mmol/L    Calcium 9.5 8.4 - 10.2 mg/dL    Total Protein 7.5 6.3 - 8.6 g/dL    Albumin 4.10 3.40 - 4.80 g/dL    ALT (SGPT) 36 9 - 52 U/L    AST (SGOT) 35 14 -  36 U/L    Alkaline Phosphatase 69 38 - 126 U/L    Total Bilirubin 0.4 0.2 - 1.3 mg/dL    eGFR Non  Amer 44 (L) 45 - 104 mL/min/1.73    Globulin 3.4 2.3 - 3.5 gm/dL    A/G Ratio 1.2 1.1 - 1.8 g/dL    BUN/Creatinine Ratio 31.7 (H) 7.0 - 25.0    Anion Gap 11.0 5.0 - 15.0 mmol/L     *Note: Due to a large number of results and/or encounters for the requested time period, some results have not been displayed. A complete set of results can be found in Results Review.         This document has been electronically signed by Miguel Fiore MD on December 12, 2022 06:55 CST

## 2022-12-27 RX ORDER — LINACLOTIDE 145 UG/1
CAPSULE, GELATIN COATED ORAL
Qty: 30 CAPSULE | Refills: 2 | OUTPATIENT
Start: 2022-12-27

## 2023-01-30 ENCOUNTER — OFFICE VISIT (OUTPATIENT)
Dept: GASTROENTEROLOGY | Facility: CLINIC | Age: 68
End: 2023-01-30
Payer: MEDICARE

## 2023-01-30 VITALS
WEIGHT: 199.8 LBS | HEIGHT: 67 IN | SYSTOLIC BLOOD PRESSURE: 122 MMHG | HEART RATE: 64 BPM | BODY MASS INDEX: 31.36 KG/M2 | DIASTOLIC BLOOD PRESSURE: 70 MMHG

## 2023-01-30 DIAGNOSIS — R10.84 GENERALIZED ABDOMINAL PAIN: ICD-10-CM

## 2023-01-30 DIAGNOSIS — K76.0 FATTY LIVER: Primary | ICD-10-CM

## 2023-01-30 PROCEDURE — 99213 OFFICE O/P EST LOW 20 MIN: CPT | Performed by: INTERNAL MEDICINE

## 2023-01-30 RX ORDER — LINACLOTIDE 72 UG/1
72 CAPSULE, GELATIN COATED ORAL DAILY
COMMUNITY
Start: 2023-01-17

## 2023-02-14 NOTE — PROGRESS NOTES
Chief Complaint   Patient presents with   • fatty liver     1 month f/u        Subjective    Maude Joyner is a 67 y.o. female.    History of Present Illness  Patient presented to GI clinic for follow-up visit today.  Feels better currently.  Abdominal pain is improving with occasional symptoms with beans intake.  Denies nausea or vomiting.  Bowel movements regular.  Weight is stable.  LFTs are normal.       The following portions of the patient's history were reviewed and updated as appropriate:   Past Medical History:   Diagnosis Date   • Cervical spinal stenosis    • Dysuria    • Fibromyalgia    • GERD (gastroesophageal reflux disease)    • Hyperlipidemia    • Hypertension    • Osteoporosis    • Urinary tract infectious disease      Past Surgical History:   Procedure Laterality Date   • BACK SURGERY     • COLONOSCOPY     • COLONOSCOPY N/A 6/9/2022    Procedure: COLONOSCOPY;  Surgeon: Miguel Fiore MD;  Location: [facility] ENDOSCOPY;  Service: Gastroenterology;  Laterality: N/A;   • ENDOSCOPY N/A 6/9/2022    Procedure: ESOPHAGOGASTRODUODENOSCOPY;  Surgeon: Miguel Fiore MD;  Location: [facility] ENDOSCOPY;  Service: Gastroenterology;  Laterality: N/A;   • HYSTERECTOMY     • KNEE ARTHROPLASTY     • SINUS SURGERY       Family History   Problem Relation Age of Onset   • Heart disease Mother    • Hypertension Mother    • Colon cancer Father    • Cancer Father    • Heart disease Father    • Hypertension Father      OB History    No obstetric history on file.       Prior to Admission medications    Medication Sig Start Date End Date Taking? Authorizing Provider   acetaminophen (TYLENOL) 500 MG tablet Take 1,000 mg by mouth 2 (Two) Times a Day.   Yes Yeimy Bah MD   ascorbic acid (VITAMIN C) 100 MG tablet Take 100 mg by mouth Daily.   Yes Yeimy Bah MD   cholecalciferol (VITAMIN D3) 25 MCG (1000 UT) tablet Take 2,000 Units by mouth Daily.   Yes Yeimy Bah MD   escitalopram (LEXAPRO)  20 MG tablet Take 20 mg by mouth Daily. 8/6/18  Yes Yeimy Bah MD   fenofibrate (TRICOR) 145 MG tablet Take 145 mg by mouth Daily.   Yes Yeimy Bah MD   irbesartan (AVAPRO) 150 MG tablet Take 1 tablet by mouth Daily. 1/11/19  Yes Robbie Boothe MD   metoprolol succinate XL (TOPROL-XL) 100 MG 24 hr tablet Take 1 tablet by mouth every night at bedtime.  Patient taking differently: 1/2 tablet by mouth in A.M. and 1/2 tablet by mouth in P.M. 1/11/19  Yes Robbie Boothe MD   NIACIN PO Take 500 mg by mouth Daily.   Yes Yeimy Bah MD   omeprazole (priLOSEC) 40 MG capsule Take 1 capsule by mouth Daily. 5/27/22  Yes Miguel Fiore MD   simvastatin (ZOCOR) 20 MG tablet Take 20 mg by mouth Daily. 3/24/22  Yes Yeimy Bah MD   temazepam (RESTORIL) 30 MG capsule Take 30 mg by mouth every night at bedtime. 8/7/18  Yes Yeimy Bah MD   traMADol (ULTRAM) 50 MG tablet Take 50 mg by mouth 2 (Two) Times a Day.   Yes Yeimy Bah MD   triamcinolone (KENALOG) 0.5 % cream Apply 1 application topically 3 (Three) Times a Day.   Yes Yeimy Bah MD   Linzess 72 MCG capsule capsule Take 72 mcg by mouth Daily. 1/17/23   Yeimy Bah MD     Allergies   Allergen Reactions   • Ace Inhibitors Dizziness   • Carafate [Sucralfate] Other (See Comments)     Abdominal Pain   • Cat Hair Extract Unknown - Low Severity   • Chocolate Flavor Unknown - Low Severity   • Gabapentin Swelling   • Gemfibrozil Swelling   • Morphine Unknown - Low Severity   • Oxycodone Itching   • Strawberry Unknown - Low Severity   • Sulfate Unknown - Low Severity     Other reaction(s): Rash   • Tomato Unknown - Low Severity   • Doxycycline Rash   • Latex Rash   • Morphine Sulfate Rash   • Nickel Rash   • Other Rash     Electrode adhesive- burning and rash     • Sulfa Antibiotics Rash   • Tapentadol Rash     Rash     Social History     Socioeconomic History   • Marital status:   "  Tobacco Use   • Smoking status: Former   • Smokeless tobacco: Never   Vaping Use   • Vaping Use: Never used   Substance and Sexual Activity   • Alcohol use: No   • Drug use: No   • Sexual activity: Defer       Review of Systems  Review of Systems   Constitutional: Negative for chills, fatigue, fever and unexpected weight change.   HENT: Negative for congestion, ear discharge, hearing loss, nosebleeds and sore throat.    Eyes: Negative for pain, discharge and redness.   Respiratory: Negative for cough, chest tightness, shortness of breath and wheezing.    Cardiovascular: Negative for chest pain and palpitations.   Gastrointestinal: Positive for abdominal pain. Negative for abdominal distention, blood in stool, constipation, diarrhea, nausea and vomiting.   Endocrine: Negative for cold intolerance, polydipsia, polyphagia and polyuria.   Genitourinary: Negative for dysuria, flank pain, frequency, hematuria and urgency.   Musculoskeletal: Negative for arthralgias, back pain, joint swelling and myalgias.   Skin: Negative for color change, pallor and rash.   Neurological: Negative for tremors, seizures, syncope, weakness and headaches.   Hematological: Negative for adenopathy. Does not bruise/bleed easily.   Psychiatric/Behavioral: Negative for behavioral problems, confusion, dysphoric mood, hallucinations and suicidal ideas. The patient is not nervous/anxious.         /70 (BP Location: Right arm)   Pulse 64   Ht 170.2 cm (67\")   Wt 90.6 kg (199 lb 12.8 oz)   LMP 05/01/2004 (Approximate)   BMI 31.29 kg/m²     Objective    Physical Exam  Constitutional:       Appearance: She is well-developed.   HENT:      Head: Normocephalic and atraumatic.   Eyes:      Conjunctiva/sclera: Conjunctivae normal.      Pupils: Pupils are equal, round, and reactive to light.   Neck:      Thyroid: No thyromegaly.   Cardiovascular:      Rate and Rhythm: Normal rate and regular rhythm.      Heart sounds: Normal heart sounds. No " murmur heard.  Pulmonary:      Effort: Pulmonary effort is normal.      Breath sounds: Normal breath sounds. No wheezing.   Abdominal:      General: Bowel sounds are normal. There is no distension.      Palpations: Abdomen is soft. There is no mass.      Tenderness: There is no abdominal tenderness.      Hernia: No hernia is present.   Genitourinary:     Comments: No lesions noted  Musculoskeletal:         General: No tenderness. Normal range of motion.      Cervical back: Normal range of motion and neck supple.   Lymphadenopathy:      Cervical: No cervical adenopathy.   Skin:     General: Skin is warm and dry.      Findings: No rash.   Neurological:      Mental Status: She is alert and oriented to person, place, and time.      Cranial Nerves: No cranial nerve deficit.   Psychiatric:         Thought Content: Thought content normal.       Lab on 11/09/2022   Component Date Value Ref Range Status   • Class Description 11/09/2022 Comment   Final        Levels of Specific IgE       Class  Description of Class      ---------------------------  -----  --------------------                     < 0.10         0         Negative             0.10 -    0.31         0/I       Equivocal/Low             0.32 -    0.55         I         Low             0.56 -    1.40         II        Moderate             1.41 -    3.90         III       High             3.91 -   19.00         IV        Very High            19.01 -  100.00         V         Very High                    >100.00         VI        Very High   • IgE 11/09/2022 28  6 - 495 IU/mL Final   • [id number] Alpha-Gal 11/09/2022 <0.10  Class 0 kU/L Final   • Beef 11/09/2022 <0.10  Class 0 kU/L Final   • Pork 11/09/2022 <0.10  Class 0 kU/L Final   • Ware 11/09/2022 <0.10  Class 0 kU/L Final     Assessment & Plan    No diagnosis found..   1.  Abdominal pain with constipation, likely due to IBS.  Continue Linzess and high-fiber diet.  2.  GERD, well controlled with PPI.  Continue PPI  and antireflux lifestyle.  3.  Colon polyps and family history of colon cancer, due colonoscopy in 3 years.  4.  Diverticulosis, continue high-fiber diet.  5.  Fatty liver disease, recommend exercise and diet control.  6.  Obesity, recommend exercise and diet control.    Orders placed during this encounter include:  No orders of the defined types were placed in this encounter.      * Surgery not found *    Review and/or summary of lab tests, radiology, procedures, medications. Review and summary of old records and obtaining of history. The risks and benefits of my recommendations, as well as other treatment options were discussed with the patient today. Questions were answered.    No orders of the defined types were placed in this encounter.      Follow-up: Return in about 6 months (around 7/30/2023).               Results for orders placed or performed in visit on 11/09/22   Alpha-Gal IgE Panel    Specimen: Blood   Result Value Ref Range    Class Description Comment     IgE 28 6 - 495 IU/mL    [id number] Alpha-Gal <0.10 Class 0 kU/L    Beef <0.10 Class 0 kU/L    Pork <0.10 Class 0 kU/L    Lamb <0.10 Class 0 kU/L   Results for orders placed or performed in visit on 11/09/22   Hepatic Function Panel    Specimen: Blood   Result Value Ref Range    Total Protein 7.4 6.0 - 8.5 g/dL    Albumin 4.10 3.50 - 5.20 g/dL    ALT (SGPT) 15 1 - 33 U/L    AST (SGOT) 30 1 - 32 U/L    Alkaline Phosphatase 61 39 - 117 U/L    Total Bilirubin 0.4 0.0 - 1.2 mg/dL    Bilirubin, Direct <0.2 0.0 - 0.3 mg/dL    Bilirubin, Indirect     Results for orders placed or performed in visit on 08/23/22   BUN    Specimen: Blood   Result Value Ref Range    BUN 33 (H) 8 - 23 mg/dL   Creatinine, Serum    Specimen: Blood   Result Value Ref Range    Creatinine 1.22 (H) 0.57 - 1.00 mg/dL    eGFR 48.1 (L) >60.0 mL/min/1.73   Results for orders placed or performed in visit on 07/11/22   Hepatitis Panel, Acute    Specimen: Blood   Result Value Ref Range     Hepatitis B Surface Ag Non-Reactive Non-Reactive    Hep A IgM Non-Reactive Non-Reactive    Hep B C IgM Non-Reactive Non-Reactive    Hepatitis C Ab Non-Reactive Non-Reactive   Comprehensive Metabolic Panel    Specimen: Blood   Result Value Ref Range    Glucose 100 (H) 65 - 99 mg/dL    BUN 27 (H) 8 - 23 mg/dL    Creatinine 1.42 (H) 0.57 - 1.00 mg/dL    Sodium 140 136 - 145 mmol/L    Potassium 4.5 3.5 - 5.2 mmol/L    Chloride 104 98 - 107 mmol/L    CO2 26.0 22.0 - 29.0 mmol/L    Calcium 9.9 8.6 - 10.5 mg/dL    Total Protein 7.3 6.0 - 8.5 g/dL    Albumin 4.70 3.50 - 5.20 g/dL    ALT (SGPT) 25 1 - 33 U/L    AST (SGOT) 33 (H) 1 - 32 U/L    Alkaline Phosphatase 69 39 - 117 U/L    Total Bilirubin 0.6 0.0 - 1.2 mg/dL    Globulin 2.6 gm/dL    A/G Ratio 1.8 g/dL    BUN/Creatinine Ratio 19.0 7.0 - 25.0    Anion Gap 10.0 5.0 - 15.0 mmol/L    eGFR 40.1 (L) >60.0 mL/min/1.73   Results for orders placed or performed during the hospital encounter of 22   TISSUE EXAM, [facility] ([facility]HELLE,[facility])    Specimen: A: Small Intestine, Duodenum; Tissue    B: Gastric, Antrum; Tissue    C: Gastric, Body; Tissue    D: Esophagus; Tissue    E: Large Intestine, Transverse Colon; Tissue    F: Large Intestine, Sigmoid Colon; Tissue   Result Value Ref Range    Reference Lab Report       [facility]  [address] [place] [address]  Phone: [phone] or [phone]  Fax: [phone]  Pete Gonzalez M.D., Medical Director    PATIENT NAME                           LABORATORY NO.  [id number]                      [id number]  [id number]                         AGE              SEX  SSN           CLIENT REF #  [facility]           67      1955  F    [id number]   [id number]    [place]                       REQUESTING M.D.     ATTENDING M.D.     COPY TO.  [address]                 EZ[place] [address]             [facility]  "COLLECTED      DATE RECEIVED      DATE REPORTED  06/09/2022          06/10/2022         06/13/2022    DIAGNOSIS:  A.   DUODENUM, BIOPSY:  No significant histologic abnormality  B.   ANTRUM, BIOPSY:  Reactive gastropathy  C.   GASTRIC POLYPS:  Fundic gland polyps  D.   GE JUNCTION:  Reactive gastric and squamous mucosa  Negative  for specialized Alcantar's mucosa  E.   TRANSVERSE COLON POLYP:  Tubular adenoma  F.   SIGMOID COLON POLYP:  Tubular adenoma    JBS/pah    CLINICAL HISTORY:  Generalized abdominal pain  Nausea  Other constipation    SPECIMENS RECEIVED:  A.  DUODENUM, BIOPSY  B.  ANTRUM, BIOPSY  C.  GASTRIC POLYPS  D.  GE JUNCTION  E.  TRANSVERSE COLON POLYP  F.  SIGMOID COLON POLYP    MICROSCOPIC DESCRIPTION:  Tissue blocks are prepared and slides are examined microscopically on all  specimens. See diagnosis for details.    Professional interpretation rendered by Sp De M.D. at [facility], [address] [place] [address].    GROSS DESCRIPTION:  A.  Specimen is received in 1 formalin filled container \"duodenal biopsy\"  consists of a single piece of tan soft tissue measuring 0.3 x 0.2 x 0.2 cm.  Specimen is submitted entirely in 1 cassette.  B.  Specimen is received in 1 formalin filled container \"antrum biopsy\" and  consists of 2 pieces of tan soft tissue measuring 0.3 x 0.2 x  0.2 cm.  Specimen is submitted entirely in 1 cassette.  C.  Specimen is received in 1 formalin filled container \"gastric polyps\" and  consists of 2 pieces of tan soft tissue measuring 0.4 x 0.2 x 0.1 cm.  Specimen is submitted entirely in 1 cassette.  D.    Specimen is received in 1 formalin filled container \"GE junction\" and  consists of a single piece of tan soft tissue measuring 0.3 x 0.3 x 0.1 cm.  Specimen is submitted entirely in 1 cassette.  E.  Specimen is received in 1 formalin filled container \"transverse polyp\" and  consists of 3 pieces of tan soft tissue measuring 0.5 x 0.3 x 0.2 cm.  Specimen is " "submitted entirely in 1 cassette.  F.  Specimen is received in 1 formalin filled container \"sigmoid polyp snare\"  and consists of 2 pieces of tan soft tissue measuring 0.4 x 0.3 x 0.2 cm.  Specimen is submitted entirely in 1 cassette.  MM    REVIEWED, DIAGNOSED AND ELECTRONICALLY  SIGNED BY:    Sp De M.D.  CPT CODES:  88305x6     Results for orders placed or performed during the hospital encounter of 06/04/18   Tilt Table   Result Value Ref Range    Target HR (85%) 132 bpm    Max. Pred. HR (100%) 155 bpm   Results for orders placed or performed during the hospital encounter of 05/24/18   Gold Top - SST   Result Value Ref Range    Extra Tube Hold for add-ons.    Green Top (Gel)   Result Value Ref Range    Extra Tube Hold for add-ons.    CK-MB    Specimen: Blood   Result Value Ref Range    CKMB 1.78 0.00 - 5.00 ng/mL   CBC Auto Differential    Specimen: Blood   Result Value Ref Range    WBC 7.78 3.20 - 9.80 10*3/mm3    RBC 4.59 3.77 - 5.16 10*6/mm3    Hemoglobin 13.7 12.0 - 15.5 g/dL    Hematocrit 40.0 35.0 - 45.0 %    MCV 87.1 80.0 - 98.0 fL    MCH 29.8 26.5 - 34.0 pg    MCHC 34.3 31.4 - 36.0 g/dL    RDW 13.3 11.5 - 14.5 %    RDW-SD 42.4 36.4 - 46.3 fl    MPV 10.5 8.0 - 12.0 fL    Platelets 301 150 - 450 10*3/mm3    Neutrophil % 56.7 37.0 - 80.0 %    Lymphocyte % 28.4 10.0 - 50.0 %    Monocyte % 11.1 0.0 - 12.0 %    Eosinophil % 2.1 0.0 - 7.0 %    Basophil % 0.8 0.0 - 2.0 %    Immature Grans % 0.9 (H) 0.0 - 0.5 %    Neutrophils, Absolute 4.42 2.00 - 8.60 10*3/mm3    Lymphocytes, Absolute 2.21 0.60 - 4.20 10*3/mm3    Monocytes, Absolute 0.86 0.00 - 0.90 10*3/mm3    Eosinophils, Absolute 0.16 0.00 - 0.70 10*3/mm3    Basophils, Absolute 0.06 0.00 - 0.20 10*3/mm3    Immature Grans, Absolute 0.07 (H) 0.00 - 0.02 10*3/mm3   Lavender Top   Result Value Ref Range    Extra Tube hold for add-on    Lavender Top   Result Value Ref Range    Extra Tube hold for add-on    Light Blue Top   Result Value Ref Range    Extra " Tube hold for add-on    Troponin    Specimen: Blood   Result Value Ref Range    Troponin I <0.012 <=0.034 ng/mL   Troponin    Specimen: Arm, Left; Blood   Result Value Ref Range    Troponin I <0.012 <=0.034 ng/mL   Troponin    Specimen: Blood   Result Value Ref Range    Troponin I <0.012 <=0.034 ng/mL   aPTT    Specimen: Blood   Result Value Ref Range    PTT 28.0 20.0 - 40.3 seconds   Protime-INR    Specimen: Blood   Result Value Ref Range    Protime 13.2 11.1 - 15.3 Seconds    INR 1.02 0.80 - 1.20   D-dimer, Quantitative    Specimen: Blood   Result Value Ref Range    D-Dimer, Quantitative 369 0 - 470 ng/mL (FEU)   CBC (No Diff)    Specimen: Blood   Result Value Ref Range    WBC 5.64 3.20 - 9.80 10*3/mm3    RBC 4.17 3.77 - 5.16 10*6/mm3    Hemoglobin 12.3 12.0 - 15.5 g/dL    Hematocrit 36.3 35.0 - 45.0 %    MCV 87.1 80.0 - 98.0 fL    MCH 29.5 26.5 - 34.0 pg    MCHC 33.9 31.4 - 36.0 g/dL    RDW 13.1 11.5 - 14.5 %    RDW-SD 41.9 36.4 - 46.3 fl    MPV 10.2 8.0 - 12.0 fL    Platelets 227 150 - 450 10*3/mm3   BNP    Specimen: Blood   Result Value Ref Range    proBNP 138.0 0.0 - 900.0 pg/mL   CK    Specimen: Blood   Result Value Ref Range    Creatine Kinase 106 30 - 135 U/L   Comprehensive Metabolic Panel    Specimen: Blood   Result Value Ref Range    Glucose 93 60 - 100 mg/dL    BUN 39 (H) 7 - 21 mg/dL    Creatinine 1.23 (H) 0.50 - 1.00 mg/dL    Sodium 138 137 - 145 mmol/L    Potassium 4.1 3.5 - 5.1 mmol/L    Chloride 103 95 - 110 mmol/L    CO2 24.0 22.0 - 31.0 mmol/L    Calcium 9.5 8.4 - 10.2 mg/dL    Total Protein 7.5 6.3 - 8.6 g/dL    Albumin 4.10 3.40 - 4.80 g/dL    ALT (SGPT) 36 9 - 52 U/L    AST (SGOT) 35 14 - 36 U/L    Alkaline Phosphatase 69 38 - 126 U/L    Total Bilirubin 0.4 0.2 - 1.3 mg/dL    eGFR Non  Amer 44 (L) 45 - 104 mL/min/1.73    Globulin 3.4 2.3 - 3.5 gm/dL    A/G Ratio 1.2 1.1 - 1.8 g/dL    BUN/Creatinine Ratio 31.7 (H) 7.0 - 25.0    Anion Gap 11.0 5.0 - 15.0 mmol/L     *Note: Due to a large  number of results and/or encounters for the requested time period, some results have not been displayed. A complete set of results can be found in Results Review.         This document has been electronically signed by Miguel Fiore MD on February 13, 2023 21:40 CST

## 2023-07-31 ENCOUNTER — LAB (OUTPATIENT)
Dept: LAB | Facility: HOSPITAL | Age: 68
End: 2023-07-31
Payer: MEDICARE

## 2023-07-31 ENCOUNTER — OFFICE VISIT (OUTPATIENT)
Dept: GASTROENTEROLOGY | Facility: CLINIC | Age: 68
End: 2023-07-31
Payer: MEDICARE

## 2023-07-31 VITALS
BODY MASS INDEX: 32.24 KG/M2 | DIASTOLIC BLOOD PRESSURE: 87 MMHG | HEART RATE: 57 BPM | SYSTOLIC BLOOD PRESSURE: 124 MMHG | WEIGHT: 205.4 LBS | HEIGHT: 67 IN

## 2023-07-31 DIAGNOSIS — R10.10 PAIN OF UPPER ABDOMEN: Primary | ICD-10-CM

## 2023-07-31 DIAGNOSIS — R14.0 BLOATING: ICD-10-CM

## 2023-07-31 LAB
ALBUMIN SERPL-MCNC: 4.3 G/DL (ref 3.5–5.2)
ALP SERPL-CCNC: 64 U/L (ref 39–117)
ALT SERPL W P-5'-P-CCNC: 18 U/L (ref 1–33)
AST SERPL-CCNC: 25 U/L (ref 1–32)
BILIRUB CONJ SERPL-MCNC: <0.2 MG/DL (ref 0–0.3)
BILIRUB INDIRECT SERPL-MCNC: NORMAL MG/DL
BILIRUB SERPL-MCNC: 0.5 MG/DL (ref 0–1.2)
PROT SERPL-MCNC: 7.2 G/DL (ref 6–8.5)

## 2023-07-31 PROCEDURE — 80076 HEPATIC FUNCTION PANEL: CPT | Performed by: INTERNAL MEDICINE

## 2023-07-31 PROCEDURE — 36415 COLL VENOUS BLD VENIPUNCTURE: CPT | Performed by: INTERNAL MEDICINE

## 2023-07-31 RX ORDER — DEXTROSE AND SODIUM CHLORIDE 5; .45 G/100ML; G/100ML
30 INJECTION, SOLUTION INTRAVENOUS CONTINUOUS PRN
Status: CANCELLED | OUTPATIENT
Start: 2023-08-11

## 2023-07-31 RX ORDER — SODIUM CHLORIDE 9 MG/ML
40 INJECTION, SOLUTION INTRAVENOUS AS NEEDED
Status: CANCELLED | OUTPATIENT
Start: 2023-08-11

## 2023-08-10 ENCOUNTER — ANESTHESIA EVENT (OUTPATIENT)
Dept: GASTROENTEROLOGY | Facility: HOSPITAL | Age: 68
End: 2023-08-10
Payer: MEDICARE

## 2023-08-11 ENCOUNTER — HOSPITAL ENCOUNTER (OUTPATIENT)
Facility: HOSPITAL | Age: 68
Setting detail: HOSPITAL OUTPATIENT SURGERY
Discharge: HOME | End: 2023-08-11
Attending: INTERNAL MEDICINE | Admitting: INTERNAL MEDICINE
Payer: MEDICARE

## 2023-08-11 ENCOUNTER — ANESTHESIA (OUTPATIENT)
Dept: GASTROENTEROLOGY | Facility: HOSPITAL | Age: 68
End: 2023-08-11
Payer: MEDICARE

## 2023-08-11 VITALS
HEART RATE: 57 BPM | DIASTOLIC BLOOD PRESSURE: 66 MMHG | SYSTOLIC BLOOD PRESSURE: 119 MMHG | HEIGHT: 67 IN | OXYGEN SATURATION: 97 % | RESPIRATION RATE: 18 BRPM | WEIGHT: 207 LBS | BODY MASS INDEX: 32.49 KG/M2 | TEMPERATURE: 97.4 F

## 2023-08-11 DIAGNOSIS — R10.10 PAIN OF UPPER ABDOMEN: ICD-10-CM

## 2023-08-11 DIAGNOSIS — R14.0 BLOATING: ICD-10-CM

## 2023-08-11 PROCEDURE — 43239 EGD BIOPSY SINGLE/MULTIPLE: CPT | Performed by: INTERNAL MEDICINE

## 2023-08-11 PROCEDURE — 25010000002 PROPOFOL 10 MG/ML EMULSION

## 2023-08-11 PROCEDURE — 87071 CULTURE AEROBIC QUANT OTHER: CPT | Performed by: INTERNAL MEDICINE

## 2023-08-11 RX ORDER — LIDOCAINE HYDROCHLORIDE 20 MG/ML
INJECTION, SOLUTION EPIDURAL; INFILTRATION; INTRACAUDAL; PERINEURAL AS NEEDED
Status: DISCONTINUED | OUTPATIENT
Start: 2023-08-11 | End: 2023-08-11 | Stop reason: SURG

## 2023-08-11 RX ORDER — DEXTROSE AND SODIUM CHLORIDE 5; .45 G/100ML; G/100ML
30 INJECTION, SOLUTION INTRAVENOUS CONTINUOUS PRN
Status: DISCONTINUED | OUTPATIENT
Start: 2023-08-11 | End: 2023-08-11 | Stop reason: HOSPADM

## 2023-08-11 RX ORDER — SODIUM CHLORIDE 9 MG/ML
40 INJECTION, SOLUTION INTRAVENOUS AS NEEDED
Status: DISCONTINUED | OUTPATIENT
Start: 2023-08-11 | End: 2023-08-11 | Stop reason: HOSPADM

## 2023-08-11 RX ORDER — PROPOFOL 10 MG/ML
VIAL (ML) INTRAVENOUS AS NEEDED
Status: DISCONTINUED | OUTPATIENT
Start: 2023-08-11 | End: 2023-08-11 | Stop reason: SURG

## 2023-08-11 RX ADMIN — PROPOFOL 150 MG: 10 INJECTION, EMULSION INTRAVENOUS at 13:52

## 2023-08-11 RX ADMIN — DEXTROSE AND SODIUM CHLORIDE: 5; 450 INJECTION, SOLUTION INTRAVENOUS at 13:37

## 2023-08-11 RX ADMIN — GLYCOPYRROLATE 0.1 MG: 0.2 INJECTION, SOLUTION INTRAMUSCULAR; INTRAVITREAL at 13:45

## 2023-08-11 RX ADMIN — LIDOCAINE HYDROCHLORIDE 100 MG: 20 INJECTION, SOLUTION EPIDURAL; INFILTRATION; INTRACAUDAL; PERINEURAL at 13:52

## 2023-08-11 NOTE — ANESTHESIA PREPROCEDURE EVALUATION
Anesthesia Evaluation     Patient summary reviewed and Nursing notes reviewed   no history of anesthetic complications:   NPO Solid Status: > 8 hours  NPO Liquid Status: > 4 hours           Airway   Mallampati: II  TM distance: >3 FB  Neck ROM: full  no difficulty expected  Dental - normal exam     Pulmonary - normal exam   (+) ,shortness of breath  Cardiovascular - normal exam    Beta blocker given within 24 hours of surgery    (+) hypertensionhyperlipidemia      Neuro/Psych  (+) dizziness/light headedness  GI/Hepatic/Renal/Endo    (+) obesity, GERD, renal disease    Musculoskeletal     Abdominal   (+) obese   Substance History      OB/GYN negative ob/gyn ROS         Other   arthritis,                     Anesthesia Plan    ASA 3     general   total IV anesthesia  intravenous induction     Anesthetic plan, risks, benefits, and alternatives have been provided, discussed and informed consent has been obtained with: patient.    Plan discussed with CRNA.      CODE STATUS:

## 2023-08-11 NOTE — H&P
Chief Complaint   Patient presents with    fatty liver       6 month f/u     Bloated    Abdominal Pain         Subjective    Maude Joyner is a 68 y.o. female.     History of Present Illness  Patient presented to GI clinic for follow-up visit today.  Has intermittent symptoms of epigastric pain and bloating.  Symptoms are worse in the evening.  Also has constipation.  Denies nausea or vomiting.  Weight is stable.  GERD is well controlled.  Taking Prilosec daily.        The following portions of the patient's history were reviewed and updated as appropriate:   Medical History        Past Medical History:   Diagnosis Date    Cervical spinal stenosis      Dysuria      Fibromyalgia      GERD (gastroesophageal reflux disease)      Hyperlipidemia      Hypertension      Osteoporosis      Urinary tract infectious disease           Surgical History         Past Surgical History:   Procedure Laterality Date    BACK SURGERY        COLONOSCOPY        COLONOSCOPY N/A 6/9/2022     Procedure: COLONOSCOPY;  Surgeon: Miguel Fiore MD;  Location: [facility] ENDOSCOPY;  Service: Gastroenterology;  Laterality: N/A;    ENDOSCOPY N/A 6/9/2022     Procedure: ESOPHAGOGASTRODUODENOSCOPY;  Surgeon: Miguel Fiore MD;  Location: [facility] ENDOSCOPY;  Service: Gastroenterology;  Laterality: N/A;    HYSTERECTOMY        KNEE ARTHROPLASTY        SINUS SURGERY                   Family History   Problem Relation Age of Onset    Heart disease Mother      Hypertension Mother      Colon cancer Father      Cancer Father      Heart disease Father      Hypertension Father        OB History    No obstetric history on file.                 Prior to Admission medications    Medication Sig Start Date End Date Taking? Authorizing Provider   acetaminophen (TYLENOL) 500 MG tablet Take 2 tablets by mouth 2 (Two) Times a Day.     Yes Yeimy Bah MD   ascorbic acid (VITAMIN C) 100 MG tablet Take 1 tablet by mouth Daily.     Yes Yeimy Bah  MD   cholecalciferol (VITAMIN D3) 25 MCG (1000 UT) tablet Take 2 tablets by mouth Daily.     Yes Yeimy Bah MD   escitalopram (LEXAPRO) 20 MG tablet Take 1 tablet by mouth Daily. 8/6/18   Yes Yeimy Bah MD   fenofibrate (TRICOR) 145 MG tablet Take 1 tablet by mouth Daily.     Yes Yeimy Bah MD   irbesartan (AVAPRO) 150 MG tablet Take 1 tablet by mouth Daily. 1/11/19   Yes Robbie Boothe MD   Linzess 72 MCG capsule capsule Take 1 capsule by mouth Daily. 1/17/23   Yes Yeimy Bah MD   metoprolol succinate XL (TOPROL-XL) 100 MG 24 hr tablet Take 1 tablet by mouth every night at bedtime.  Patient taking differently: 1/2 tablet by mouth in A.M. and 1/2 tablet by mouth in P.M. 1/11/19   Yes Robbie Boothe MD   NIACIN PO Take 500 mg by mouth Daily.     Yes Yeimy Bah MD   omeprazole (priLOSEC) 40 MG capsule Take 1 capsule by mouth Daily. 5/27/22   Yes Miguel Fiore MD   simvastatin (ZOCOR) 20 MG tablet Take 1 tablet by mouth Daily. 3/24/22   Yes Yeimy Bah MD   temazepam (RESTORIL) 30 MG capsule Take 1 capsule by mouth every night at bedtime. 8/7/18   Yes Yeimy Bah MD   traMADol (ULTRAM) 50 MG tablet Take 1 tablet by mouth 2 (Two) Times a Day.     Yes ProviderYeimy MD   triamcinolone (KENALOG) 0.5 % cream Apply 1 application  topically to the appropriate area as directed 3 (Three) Times a Day.     Yes ProviderYeimy MD            Allergies   Allergen Reactions    Ace Inhibitors Dizziness    Carafate [Sucralfate] Other (See Comments)       Abdominal Pain    Cat Hair Extract Unknown - Low Severity    Chocolate Flavor Unknown - Low Severity    Gabapentin Swelling    Gemfibrozil Swelling    Morphine Unknown - Low Severity    Oxycodone Itching    Strawberry Unknown - Low Severity    Sulfate Unknown - Low Severity       Other reaction(s): Rash    Tomato Unknown - Low Severity    Doxycycline Rash    Latex Rash     "Morphine Sulfate Rash    Nickel Rash    Other Rash       Electrode adhesive- burning and rash       Sulfa Antibiotics Rash    Tapentadol Rash       Rash      Social History   Social History           Socioeconomic History    Marital status:    Tobacco Use    Smoking status: Former    Smokeless tobacco: Never   Vaping Use    Vaping Use: Never used   Substance and Sexual Activity    Alcohol use: No    Drug use: No    Sexual activity: Defer            Review of Systems  Review of Systems   Constitutional:  Negative for chills, fatigue, fever and unexpected weight change.   HENT:  Negative for congestion, ear discharge, hearing loss, nosebleeds and sore throat.    Eyes:  Negative for pain, discharge and redness.   Respiratory:  Negative for cough, chest tightness, shortness of breath and wheezing.    Cardiovascular:  Negative for chest pain and palpitations.   Gastrointestinal:  Positive for abdominal pain, constipation and nausea. Negative for abdominal distention, blood in stool, diarrhea and vomiting.   Endocrine: Negative for cold intolerance, polydipsia, polyphagia and polyuria.   Genitourinary:  Negative for dysuria, flank pain, frequency, hematuria and urgency.   Musculoskeletal:  Negative for arthralgias, back pain, joint swelling and myalgias.   Skin:  Negative for color change, pallor and rash.   Neurological:  Negative for tremors, seizures, syncope, weakness and headaches.   Hematological:  Negative for adenopathy. Does not bruise/bleed easily.   Psychiatric/Behavioral:  Negative for behavioral problems, confusion, dysphoric mood, hallucinations and suicidal ideas. The patient is not nervous/anxious.            /87 (BP Location: Right arm)   Pulse 57   Ht 170.2 cm (67\")   Wt 93.2 kg (205 lb 6.4 oz)   LMP 05/01/2004 (Approximate)   BMI 32.17 kg/mý      Objective    Physical Exam  Constitutional:       Appearance: She is well-developed.   HENT:      Head: Normocephalic and atraumatic.   Eyes: "      Conjunctiva/sclera: Conjunctivae normal.      Pupils: Pupils are equal, round, and reactive to light.   Neck:      Thyroid: No thyromegaly.   Cardiovascular:      Rate and Rhythm: Normal rate and regular rhythm.      Heart sounds: Normal heart sounds. No murmur heard.  Pulmonary:      Effort: Pulmonary effort is normal.      Breath sounds: Normal breath sounds. No wheezing.   Abdominal:      General: Bowel sounds are normal. There is no distension.      Palpations: Abdomen is soft. There is no mass.      Tenderness: There is no abdominal tenderness.      Hernia: No hernia is present.   Genitourinary:     Comments: No lesions noted  Musculoskeletal:         General: No tenderness. Normal range of motion.      Cervical back: Normal range of motion and neck supple.   Lymphadenopathy:      Cervical: No cervical adenopathy.   Skin:     General: Skin is warm and dry.      Findings: No rash.   Neurological:      Mental Status: She is alert and oriented to person, place, and time.      Cranial Nerves: No cranial nerve deficit.   Psychiatric:         Thought Content: Thought content normal.              Lab on 11/09/2022   Component Date Value Ref Range Status    Class Description 11/09/2022 Comment    Final         Levels of Specific IgE       Class  Description of Class      ---------------------------  -----  --------------------                     < 0.10         0         Negative             0.10 -    0.31         0/I       Equivocal/Low             0.32 -    0.55         I         Low             0.56 -    1.40         II        Moderate             1.41 -    3.90         III       High             3.91 -   19.00         IV        Very High            19.01 -  100.00         V         Very High                    >100.00         VI        Very High    IgE 11/09/2022 28  6 - 495 IU/mL Final    [id number] Alpha-Gal 11/09/2022 <0.10  Class 0 kU/L Final    Beef 11/09/2022 <0.10  Class 0 kU/L Final    Pork 11/09/2022  <0.10  Class 0 kU/L Final    Lamb 11/09/2022 <0.10  Class 0 kU/L Final      Assessment & Plan       1. Pain of upper abdomen    2. Bloating    1.  Epigastric pain with nausea rule out peptic ulcer disease, gastritis and gastroparesis.  Continue PPI and add Carafate.  Proceed with EGD for further evaluation.  Gastric emptying scan if EGD is unremarkable.  2.  GERD, well controlled with PPI.  Continue PPI and antireflux lifestyle.  3.  Constipation, add MiraLAX and high-fiber diet.  4.  Colon polyps and family history of colon cancer, repeat colonoscopy in 3 years.  5.  Diverticulosis, continue high-fiber diet.  6.  Fatty liver disease, recommend exercise and diet control.  Repeat LFTs today.  7.  Obesity, recommend exercise and diet control.         Orders placed during this encounter include:        Orders Placed This Encounter   Procedures    Hepatic Function Panel       Order Specific Question:   Release to patient       Answer:   Routine Release    Obtain Informed Consent       Standing Status:   Future       Order Specific Question:   Informed Consent Given For       Answer:   ESOPHAGOGASTRODUODENOSCOPY         ESOPHAGOGASTRODUODENOSCOPY (N/A)     Review and/or summary of lab tests, radiology, procedures, medications. Review and summary of old records and obtaining of history. The risks and benefits of my recommendations, as well as other treatment options were discussed with the patient today. Questions were answered.     No orders of the defined types were placed in this encounter.        Follow-up: Return in about 1 month (around 8/31/2023).                        Results for orders placed or performed in visit on 07/31/23   Hepatic Function Panel     Specimen: Blood   Result Value Ref Range     Total Protein 7.2 6.0 - 8.5 g/dL     Albumin 4.3 3.5 - 5.2 g/dL     ALT (SGPT) 18 1 - 33 U/L     AST (SGOT) 25 1 - 32 U/L     Alkaline Phosphatase 64 39 - 117 U/L     Total Bilirubin 0.5 0.0 - 1.2 mg/dL     Bilirubin,  Direct <0.2 0.0 - 0.3 mg/dL     Bilirubin, Indirect       Results for orders placed or performed in visit on 11/09/22   Alpha-Gal IgE Panel     Specimen: Blood   Result Value Ref Range     Class Description Comment       IgE 28 6 - 495 IU/mL     [id number] Alpha-Gal <0.10 Class 0 kU/L     Beef <0.10 Class 0 kU/L     Pork <0.10 Class 0 kU/L     Lamb <0.10 Class 0 kU/L   Results for orders placed or performed in visit on 11/09/22   Hepatic Function Panel     Specimen: Blood   Result Value Ref Range     Total Protein 7.4 6.0 - 8.5 g/dL     Albumin 4.10 3.50 - 5.20 g/dL     ALT (SGPT) 15 1 - 33 U/L     AST (SGOT) 30 1 - 32 U/L     Alkaline Phosphatase 61 39 - 117 U/L     Total Bilirubin 0.4 0.0 - 1.2 mg/dL     Bilirubin, Direct <0.2 0.0 - 0.3 mg/dL     Bilirubin, Indirect       Results for orders placed or performed in visit on 08/23/22   BUN     Specimen: Blood   Result Value Ref Range     BUN 33 (H) 8 - 23 mg/dL   Creatinine, Serum     Specimen: Blood   Result Value Ref Range     Creatinine 1.22 (H) 0.57 - 1.00 mg/dL     eGFR 48.1 (L) >60.0 mL/min/1.73   Results for orders placed or performed in visit on 07/11/22   Hepatitis Panel, Acute     Specimen: Blood   Result Value Ref Range     Hepatitis B Surface Ag Non-Reactive Non-Reactive     Hep A IgM Non-Reactive Non-Reactive     Hep B C IgM Non-Reactive Non-Reactive     Hepatitis C Ab Non-Reactive Non-Reactive   Comprehensive Metabolic Panel     Specimen: Blood   Result Value Ref Range     Glucose 100 (H) 65 - 99 mg/dL     BUN 27 (H) 8 - 23 mg/dL     Creatinine 1.42 (H) 0.57 - 1.00 mg/dL     Sodium 140 136 - 145 mmol/L     Potassium 4.5 3.5 - 5.2 mmol/L     Chloride 104 98 - 107 mmol/L     CO2 26.0 22.0 - 29.0 mmol/L     Calcium 9.9 8.6 - 10.5 mg/dL     Total Protein 7.3 6.0 - 8.5 g/dL     Albumin 4.70 3.50 - 5.20 g/dL     ALT (SGPT) 25 1 - 33 U/L     AST (SGOT) 33 (H) 1 - 32 U/L     Alkaline Phosphatase 69 39 - 117 U/L     Total Bilirubin 0.6 0.0 - 1.2 mg/dL     Globulin  2.6 gm/dL     A/G Ratio 1.8 g/dL     BUN/Creatinine Ratio 19.0 7.0 - 25.0     Anion Gap 10.0 5.0 - 15.0 mmol/L     eGFR 40.1 (L) >60.0 mL/min/1.73   Results for orders placed or performed during the hospital encounter of 22   TISSUE EXAM, [facility] ([facility]HELLE,[facility])     Specimen: A: Small Intestine, Duodenum; Tissue     B: Gastric, Antrum; Tissue     C: Gastric, Body; Tissue     D: Esophagus; Tissue     E: Large Intestine, Transverse Colon; Tissue     F: Large Intestine, Sigmoid Colon; Tissue   Result Value Ref Range     Reference Lab Report           [facility]  [address] [place] [address]  Phone: [phone] or [phone]  Fax: [phone]  Pete Gonzalez M.D., Medical Director     PATIENT NAME                           LABORATORY NO.  LÓPEZ GRAVES.                      [id number]  [id number]                         AGE              SEX  SSN           CLIENT REF #  [facility]           67      1955  F    [id number]   [id number]     [place]                       REQUESTING M.D.     ATTENDING MKEVEN     COPY TO.  [address] [place] [address]                 EZIO PRITCHARD JAYNA  [address]  DATE COLLECTED      DATE RECEIVED      DATE REPORTED  2022          06/10/2022         2022     DIAGNOSIS:  A.   DUODENUM, BIOPSY:  No significant histologic abnormality  B.   ANTRUM, BIOPSY:  Reactive gastropathy  C.   GASTRIC POLYPS:  Fundic gland polyps  D.   GE JUNCTION:  Reactive gastric and squamous mucosa  Negative  for specialized Alcantar's mucosa  E.   TRANSVERSE COLON POLYP:  Tubular adenoma  F.   SIGMOID COLON POLYP:  Tubular adenoma     JBS/pah     CLINICAL HISTORY:  Generalized abdominal pain  Nausea  Other constipation     SPECIMENS RECEIVED:  A.  DUODENUM, BIOPSY  B.  ANTRUM, BIOPSY  C.  GASTRIC POLYPS  D.  GE JUNCTION  E.  TRANSVERSE COLON POLYP  F.  SIGMOID COLON POLYP     MICROSCOPIC  "DESCRIPTION:  Tissue blocks are prepared and slides are examined microscopically on all  specimens. See diagnosis for details.     Professional interpretation rendered by Sp De M.D. at [facility], [address] [place] [address].     GROSS DESCRIPTION:  A.  Specimen is received in 1 formalin filled container \"duodenal biopsy\"  consists of a single piece of tan soft tissue measuring 0.3 x 0.2 x 0.2 cm.  Specimen is submitted entirely in 1 cassette.  B.  Specimen is received in 1 formalin filled container \"antrum biopsy\" and  consists of 2 pieces of tan soft tissue measuring 0.3 x 0.2 x  0.2 cm.  Specimen is submitted entirely in 1 cassette.  C.  Specimen is received in 1 formalin filled container \"gastric polyps\" and  consists of 2 pieces of tan soft tissue measuring 0.4 x 0.2 x 0.1 cm.  Specimen is submitted entirely in 1 cassette.  D.    Specimen is received in 1 formalin filled container \"GE junction\" and  consists of a single piece of tan soft tissue measuring 0.3 x 0.3 x 0.1 cm.  Specimen is submitted entirely in 1 cassette.  E.  Specimen is received in 1 formalin filled container \"transverse polyp\" and  consists of 3 pieces of tan soft tissue measuring 0.5 x 0.3 x 0.2 cm.  Specimen is submitted entirely in 1 cassette.  F.  Specimen is received in 1 formalin filled container \"sigmoid polyp snare\"  and consists of 2 pieces of tan soft tissue measuring 0.4 x 0.3 x 0.2 cm.  Specimen is submitted entirely in 1 cassette.  MM     REVIEWED, DIAGNOSED AND ELECTRONICALLY  SIGNED BY:     Sp De M.D.  CPT CODES:  88305x6      Results for orders placed or performed during the hospital encounter of 06/04/18   Tilt Table   Result Value Ref Range     Target HR (85%) 132 bpm     Max. Pred. HR (100%) 155 bpm   Results for orders placed or performed during the hospital encounter of 05/24/18   Gold Top - SST   Result Value Ref Range     Extra Tube Hold for add-ons.     Green Top (Gel) "   Result Value Ref Range     Extra Tube Hold for add-ons.     CK-MB     Specimen: Blood   Result Value Ref Range     CKMB 1.78 0.00 - 5.00 ng/mL   CBC Auto Differential     Specimen: Blood   Result Value Ref Range     WBC 7.78 3.20 - 9.80 10*3/mm3     RBC 4.59 3.77 - 5.16 10*6/mm3     Hemoglobin 13.7 12.0 - 15.5 g/dL     Hematocrit 40.0 35.0 - 45.0 %     MCV 87.1 80.0 - 98.0 fL     MCH 29.8 26.5 - 34.0 pg     MCHC 34.3 31.4 - 36.0 g/dL     RDW 13.3 11.5 - 14.5 %     RDW-SD 42.4 36.4 - 46.3 fl     MPV 10.5 8.0 - 12.0 fL     Platelets 301 150 - 450 10*3/mm3     Neutrophil % 56.7 37.0 - 80.0 %     Lymphocyte % 28.4 10.0 - 50.0 %     Monocyte % 11.1 0.0 - 12.0 %     Eosinophil % 2.1 0.0 - 7.0 %     Basophil % 0.8 0.0 - 2.0 %     Immature Grans % 0.9 (H) 0.0 - 0.5 %     Neutrophils, Absolute 4.42 2.00 - 8.60 10*3/mm3     Lymphocytes, Absolute 2.21 0.60 - 4.20 10*3/mm3     Monocytes, Absolute 0.86 0.00 - 0.90 10*3/mm3     Eosinophils, Absolute 0.16 0.00 - 0.70 10*3/mm3     Basophils, Absolute 0.06 0.00 - 0.20 10*3/mm3     Immature Grans, Absolute 0.07 (H) 0.00 - 0.02 10*3/mm3   Lavender Top   Result Value Ref Range     Extra Tube hold for add-on     Lavender Top   Result Value Ref Range     Extra Tube hold for add-on     Light Blue Top   Result Value Ref Range     Extra Tube hold for add-on     Troponin     Specimen: Blood   Result Value Ref Range     Troponin I <0.012 <=0.034 ng/mL   Troponin     Specimen: Arm, Left; Blood   Result Value Ref Range     Troponin I <0.012 <=0.034 ng/mL   Troponin     Specimen: Blood   Result Value Ref Range     Troponin I <0.012 <=0.034 ng/mL   aPTT     Specimen: Blood   Result Value Ref Range     PTT 28.0 20.0 - 40.3 seconds   Protime-INR     Specimen: Blood   Result Value Ref Range     Protime 13.2 11.1 - 15.3 Seconds     INR 1.02 0.80 - 1.20   D-dimer, Quantitative     Specimen: Blood   Result Value Ref Range     D-Dimer, Quantitative 369 0 - 470 ng/mL (FEU)   CBC (No Diff)     Specimen:  Blood   Result Value Ref Range     WBC 5.64 3.20 - 9.80 10*3/mm3     RBC 4.17 3.77 - 5.16 10*6/mm3     Hemoglobin 12.3 12.0 - 15.5 g/dL     Hematocrit 36.3 35.0 - 45.0 %     MCV 87.1 80.0 - 98.0 fL     MCH 29.5 26.5 - 34.0 pg     MCHC 33.9 31.4 - 36.0 g/dL     RDW 13.1 11.5 - 14.5 %     RDW-SD 41.9 36.4 - 46.3 fl     MPV 10.2 8.0 - 12.0 fL     Platelets 227 150 - 450 10*3/mm3   BNP     Specimen: Blood   Result Value Ref Range     proBNP 138.0 0.0 - 900.0 pg/mL   CK     Specimen: Blood   Result Value Ref Range     Creatine Kinase 106 30 - 135 U/L      *Note: Due to a large number of results and/or encounters for the requested time period, some results have not been displayed. A complete set of results can be found in Results Review.

## 2023-08-11 NOTE — ANESTHESIA POSTPROCEDURE EVALUATION
Patient: Maude Joyner    Procedure Summary       Date: 08/11/23 Room / Location: [facility] ENDOSCOPY 1 / [facility] ENDOSCOPY    Anesthesia Start: 1345 Anesthesia Stop: 1358    Procedure: ESOPHAGOGASTRODUODENOSCOPY Diagnosis:       Pain of upper abdomen      Bloating      (Pain of upper abdomen [R10.10])      (Bloating [R14.0])    Surgeons: Miguel Fiore MD Provider: Stefany Zacarias CRNA    Anesthesia Type: general ASA Status: 3            Anesthesia Type: general    Vitals  No vitals data found for the desired time range.          Post Anesthesia Care and Evaluation    Patient location during evaluation: bedside  Patient participation: waiting for patient participation  Level of consciousness: responsive to physical stimuli  Pain score: 0  Pain management: adequate    Airway patency: patent  Anesthetic complications: No anesthetic complications  PONV Status: none  Cardiovascular status: acceptable  Respiratory status: acceptable, spontaneous ventilation and room air  Hydration status: acceptable

## 2023-08-11 NOTE — PROGRESS NOTES
Chief Complaint   Patient presents with    fatty liver     6 month f/u     Bloated    Abdominal Pain       Subjective    Maude Joyner is a 68 y.o. female.    History of Present Illness  Patient presented to GI clinic for follow-up visit today.  Has intermittent symptoms of epigastric pain and bloating.  Symptoms are worse in the evening.  Also has constipation.  Denies nausea or vomiting.  Weight is stable.  GERD is well controlled.  Taking Prilosec daily.       The following portions of the patient's history were reviewed and updated as appropriate:   Past Medical History:   Diagnosis Date    Cervical spinal stenosis     Dysuria     Fibromyalgia     GERD (gastroesophageal reflux disease)     Hyperlipidemia     Hypertension     Osteoporosis     Urinary tract infectious disease      Past Surgical History:   Procedure Laterality Date    BACK SURGERY      COLONOSCOPY      COLONOSCOPY N/A 6/9/2022    Procedure: COLONOSCOPY;  Surgeon: Miguel Fiore MD;  Location: [facility] ENDOSCOPY;  Service: Gastroenterology;  Laterality: N/A;    ENDOSCOPY N/A 6/9/2022    Procedure: ESOPHAGOGASTRODUODENOSCOPY;  Surgeon: Migeul Fiore MD;  Location: [facility] ENDOSCOPY;  Service: Gastroenterology;  Laterality: N/A;    HYSTERECTOMY      KNEE ARTHROPLASTY      SINUS SURGERY       Family History   Problem Relation Age of Onset    Heart disease Mother     Hypertension Mother     Colon cancer Father     Cancer Father     Heart disease Father     Hypertension Father      OB History    No obstetric history on file.       Prior to Admission medications    Medication Sig Start Date End Date Taking? Authorizing Provider   acetaminophen (TYLENOL) 500 MG tablet Take 2 tablets by mouth 2 (Two) Times a Day.   Yes Yeimy Bah MD   ascorbic acid (VITAMIN C) 100 MG tablet Take 1 tablet by mouth Daily.   Yes Yeimy Bah MD   cholecalciferol (VITAMIN D3) 25 MCG (1000 UT) tablet Take 2 tablets by mouth Daily.   Yes Niurka  MD Yeimy   escitalopram (LEXAPRO) 20 MG tablet Take 1 tablet by mouth Daily. 8/6/18  Yes Yeimy Bah MD   fenofibrate (TRICOR) 145 MG tablet Take 1 tablet by mouth Daily.   Yes Yeimy Bah MD   irbesartan (AVAPRO) 150 MG tablet Take 1 tablet by mouth Daily. 1/11/19  Yes Robbie Boothe MD   Linzess 72 MCG capsule capsule Take 1 capsule by mouth Daily. 1/17/23  Yes Yeimy Bah MD   metoprolol succinate XL (TOPROL-XL) 100 MG 24 hr tablet Take 1 tablet by mouth every night at bedtime.  Patient taking differently: 1/2 tablet by mouth in A.M. and 1/2 tablet by mouth in P.M. 1/11/19  Yes Robbie Boothe MD   NIACIN PO Take 500 mg by mouth Daily.   Yes Yeimy Bah MD   omeprazole (priLOSEC) 40 MG capsule Take 1 capsule by mouth Daily. 5/27/22  Yes Miguel Fiore MD   simvastatin (ZOCOR) 20 MG tablet Take 1 tablet by mouth Daily. 3/24/22  Yes Yeimy Bah MD   temazepam (RESTORIL) 30 MG capsule Take 1 capsule by mouth every night at bedtime. 8/7/18  Yes Yeimy Bah MD   traMADol (ULTRAM) 50 MG tablet Take 1 tablet by mouth 2 (Two) Times a Day.   Yes ProviderYeimy MD   triamcinolone (KENALOG) 0.5 % cream Apply 1 application  topically to the appropriate area as directed 3 (Three) Times a Day.   Yes Yeimy Bah MD     Allergies   Allergen Reactions    Ace Inhibitors Dizziness    Carafate [Sucralfate] Other (See Comments)     Abdominal Pain    Cat Hair Extract Unknown - Low Severity    Chocolate Flavor Unknown - Low Severity    Gabapentin Swelling    Gemfibrozil Swelling    Morphine Unknown - Low Severity    Oxycodone Itching    Strawberry Unknown - Low Severity    Sulfate Unknown - Low Severity     Other reaction(s): Rash    Tomato Unknown - Low Severity    Doxycycline Rash    Latex Rash    Morphine Sulfate Rash    Nickel Rash    Other Rash     Electrode adhesive- burning and rash      Sulfa Antibiotics Rash    Tapentadol Rash  "    Rash     Social History     Socioeconomic History    Marital status:    Tobacco Use    Smoking status: Former    Smokeless tobacco: Never   Vaping Use    Vaping Use: Never used   Substance and Sexual Activity    Alcohol use: No    Drug use: No    Sexual activity: Defer       Review of Systems  Review of Systems   Constitutional:  Negative for chills, fatigue, fever and unexpected weight change.   HENT:  Negative for congestion, ear discharge, hearing loss, nosebleeds and sore throat.    Eyes:  Negative for pain, discharge and redness.   Respiratory:  Negative for cough, chest tightness, shortness of breath and wheezing.    Cardiovascular:  Negative for chest pain and palpitations.   Gastrointestinal:  Positive for abdominal pain, constipation and nausea. Negative for abdominal distention, blood in stool, diarrhea and vomiting.   Endocrine: Negative for cold intolerance, polydipsia, polyphagia and polyuria.   Genitourinary:  Negative for dysuria, flank pain, frequency, hematuria and urgency.   Musculoskeletal:  Negative for arthralgias, back pain, joint swelling and myalgias.   Skin:  Negative for color change, pallor and rash.   Neurological:  Negative for tremors, seizures, syncope, weakness and headaches.   Hematological:  Negative for adenopathy. Does not bruise/bleed easily.   Psychiatric/Behavioral:  Negative for behavioral problems, confusion, dysphoric mood, hallucinations and suicidal ideas. The patient is not nervous/anxious.       /87 (BP Location: Right arm)   Pulse 57   Ht 170.2 cm (67\")   Wt 93.2 kg (205 lb 6.4 oz)   LMP 05/01/2004 (Approximate)   BMI 32.17 kg/mý     Objective    Physical Exam  Constitutional:       Appearance: She is well-developed.   HENT:      Head: Normocephalic and atraumatic.   Eyes:      Conjunctiva/sclera: Conjunctivae normal.      Pupils: Pupils are equal, round, and reactive to light.   Neck:      Thyroid: No thyromegaly.   Cardiovascular:      Rate and " Rhythm: Normal rate and regular rhythm.      Heart sounds: Normal heart sounds. No murmur heard.  Pulmonary:      Effort: Pulmonary effort is normal.      Breath sounds: Normal breath sounds. No wheezing.   Abdominal:      General: Bowel sounds are normal. There is no distension.      Palpations: Abdomen is soft. There is no mass.      Tenderness: There is no abdominal tenderness.      Hernia: No hernia is present.   Genitourinary:     Comments: No lesions noted  Musculoskeletal:         General: No tenderness. Normal range of motion.      Cervical back: Normal range of motion and neck supple.   Lymphadenopathy:      Cervical: No cervical adenopathy.   Skin:     General: Skin is warm and dry.      Findings: No rash.   Neurological:      Mental Status: She is alert and oriented to person, place, and time.      Cranial Nerves: No cranial nerve deficit.   Psychiatric:         Thought Content: Thought content normal.     Lab on 11/09/2022   Component Date Value Ref Range Status    Class Description 11/09/2022 Comment   Final        Levels of Specific IgE       Class  Description of Class      ---------------------------  -----  --------------------                     < 0.10         0         Negative             0.10 -    0.31         0/I       Equivocal/Low             0.32 -    0.55         I         Low             0.56 -    1.40         II        Moderate             1.41 -    3.90         III       High             3.91 -   19.00         IV        Very High            19.01 -  100.00         V         Very High                    >100.00         VI        Very High    IgE 11/09/2022 28  6 - 495 IU/mL Final    [id number] Alpha-Gal 11/09/2022 <0.10  Class 0 kU/L Final    Beef 11/09/2022 <0.10  Class 0 kU/L Final    Pork 11/09/2022 <0.10  Class 0 kU/L Final    Lamb 11/09/2022 <0.10  Class 0 kU/L Final     Assessment & Plan      1. Pain of upper abdomen    2. Bloating    1.  Epigastric pain with nausea rule out peptic  ulcer disease, gastritis and gastroparesis.  Continue PPI and add Carafate.  Proceed with EGD for further evaluation.  Gastric emptying scan if EGD is unremarkable.  2.  GERD, well controlled with PPI.  Continue PPI and antireflux lifestyle.  3.  Constipation, add MiraLAX and high-fiber diet.  4.  Colon polyps and family history of colon cancer, repeat colonoscopy in 3 years.  5.  Diverticulosis, continue high-fiber diet.  6.  Fatty liver disease, recommend exercise and diet control.  Repeat LFTs today.  7.  Obesity, recommend exercise and diet control.       Orders placed during this encounter include:  Orders Placed This Encounter   Procedures    Hepatic Function Panel     Order Specific Question:   Release to patient     Answer:   Routine Release    Obtain Informed Consent     Standing Status:   Future     Order Specific Question:   Informed Consent Given For     Answer:   ESOPHAGOGASTRODUODENOSCOPY       ESOPHAGOGASTRODUODENOSCOPY (N/A)    Review and/or summary of lab tests, radiology, procedures, medications. Review and summary of old records and obtaining of history. The risks and benefits of my recommendations, as well as other treatment options were discussed with the patient today. Questions were answered.    No orders of the defined types were placed in this encounter.      Follow-up: Return in about 1 month (around 8/31/2023).               Results for orders placed or performed in visit on 07/31/23   Hepatic Function Panel    Specimen: Blood   Result Value Ref Range    Total Protein 7.2 6.0 - 8.5 g/dL    Albumin 4.3 3.5 - 5.2 g/dL    ALT (SGPT) 18 1 - 33 U/L    AST (SGOT) 25 1 - 32 U/L    Alkaline Phosphatase 64 39 - 117 U/L    Total Bilirubin 0.5 0.0 - 1.2 mg/dL    Bilirubin, Direct <0.2 0.0 - 0.3 mg/dL    Bilirubin, Indirect     Results for orders placed or performed in visit on 11/09/22   Alpha-Gal IgE Panel    Specimen: Blood   Result Value Ref Range    Class Description Comment     IgE 28 6  495  IU/mL    [id number] Alpha-Gal <0.10 Class 0 kU/L    Beef <0.10 Class 0 kU/L    Pork <0.10 Class 0 kU/L    Lamb <0.10 Class 0 kU/L   Results for orders placed or performed in visit on 11/09/22   Hepatic Function Panel    Specimen: Blood   Result Value Ref Range    Total Protein 7.4 6.0 - 8.5 g/dL    Albumin 4.10 3.50 - 5.20 g/dL    ALT (SGPT) 15 1 - 33 U/L    AST (SGOT) 30 1 - 32 U/L    Alkaline Phosphatase 61 39 - 117 U/L    Total Bilirubin 0.4 0.0 - 1.2 mg/dL    Bilirubin, Direct <0.2 0.0 - 0.3 mg/dL    Bilirubin, Indirect     Results for orders placed or performed in visit on 08/23/22   BUN    Specimen: Blood   Result Value Ref Range    BUN 33 (H) 8 - 23 mg/dL   Creatinine, Serum    Specimen: Blood   Result Value Ref Range    Creatinine 1.22 (H) 0.57 - 1.00 mg/dL    eGFR 48.1 (L) >60.0 mL/min/1.73   Results for orders placed or performed in visit on 07/11/22   Hepatitis Panel, Acute    Specimen: Blood   Result Value Ref Range    Hepatitis B Surface Ag Non-Reactive Non-Reactive    Hep A IgM Non-Reactive Non-Reactive    Hep B C IgM Non-Reactive Non-Reactive    Hepatitis C Ab Non-Reactive Non-Reactive   Comprehensive Metabolic Panel    Specimen: Blood   Result Value Ref Range    Glucose 100 (H) 65 - 99 mg/dL    BUN 27 (H) 8 - 23 mg/dL    Creatinine 1.42 (H) 0.57 - 1.00 mg/dL    Sodium 140 136 - 145 mmol/L    Potassium 4.5 3.5 - 5.2 mmol/L    Chloride 104 98 - 107 mmol/L    CO2 26.0 22.0 - 29.0 mmol/L    Calcium 9.9 8.6 - 10.5 mg/dL    Total Protein 7.3 6.0 - 8.5 g/dL    Albumin 4.70 3.50 - 5.20 g/dL    ALT (SGPT) 25 1 - 33 U/L    AST (SGOT) 33 (H) 1 - 32 U/L    Alkaline Phosphatase 69 39 - 117 U/L    Total Bilirubin 0.6 0.0 - 1.2 mg/dL    Globulin 2.6 gm/dL    A/G Ratio 1.8 g/dL    BUN/Creatinine Ratio 19.0 7.0 - 25.0    Anion Gap 10.0 5.0 - 15.0 mmol/L    eGFR 40.1 (L) >60.0 mL/min/1.73   Results for orders placed or performed during the hospital encounter of 06/09/22   TISSUE EXAM, [facility] ([facility])    Specimen:  A: Small Intestine, Duodenum; Tissue    B: Gastric, Antrum; Tissue    C: Gastric, Body; Tissue    D: Esophagus; Tissue    E: Large Intestine, Transverse Colon; Tissue    F: Large Intestine, Sigmoid Colon; Tissue   Result Value Ref Range    Reference Lab Report       [facility]  [address] [place] [address]  Phone: [phone] or [phone]  Fax: [phone]  Pete Gonzalez M.D., Medical Director    PATIENT NAME                           LABORATORY NO.  LÓPEZ GRAVES.                      [id number]  [id number]                         AGE              SEX  SSN           CLIENT REF #  [facility]           67      1955  F    [id number]   [id number]    [place]                       REQUESTING M.D.     ATTENDING MLUIS.     COPY TO.  [address] [place] [address]                 EZIO PRITCHARD JAYNA  [address]  DATE COLLECTED      DATE RECEIVED      DATE REPORTED  2022          06/10/2022         2022    DIAGNOSIS:  A.   DUODENUM, BIOPSY:  No significant histologic abnormality  B.   ANTRUM, BIOPSY:  Reactive gastropathy  C.   GASTRIC POLYPS:  Fundic gland polyps  D.   GE JUNCTION:  Reactive gastric and squamous mucosa  Negative  for specialized Alcantar's mucosa  E.   TRANSVERSE COLON POLYP:  Tubular adenoma  F.   SIGMOID COLON POLYP:  Tubular adenoma    JBS/pah    CLINICAL HISTORY:  Generalized abdominal pain  Nausea  Other constipation    SPECIMENS RECEIVED:  A.  DUODENUM, BIOPSY  B.  ANTRUM, BIOPSY  C.  GASTRIC POLYPS  D.  GE JUNCTION  E.  TRANSVERSE COLON POLYP  F.  SIGMOID COLON POLYP    MICROSCOPIC DESCRIPTION:  Tissue blocks are prepared and slides are examined microscopically on all  specimens. See diagnosis for details.    Professional interpretation rendered by Sp De M.D. at [facility], [address] [place] [address].    GROSS DESCRIPTION:  A.  Specimen is  "received in 1 formalin filled container \"duodenal biopsy\"  consists of a single piece of tan soft tissue measuring 0.3 x 0.2 x 0.2 cm.  Specimen is submitted entirely in 1 cassette.  B.  Specimen is received in 1 formalin filled container \"antrum biopsy\" and  consists of 2 pieces of tan soft tissue measuring 0.3 x 0.2 x  0.2 cm.  Specimen is submitted entirely in 1 cassette.  C.  Specimen is received in 1 formalin filled container \"gastric polyps\" and  consists of 2 pieces of tan soft tissue measuring 0.4 x 0.2 x 0.1 cm.  Specimen is submitted entirely in 1 cassette.  D.    Specimen is received in 1 formalin filled container \"GE junction\" and  consists of a single piece of tan soft tissue measuring 0.3 x 0.3 x 0.1 cm.  Specimen is submitted entirely in 1 cassette.  E.  Specimen is received in 1 formalin filled container \"transverse polyp\" and  consists of 3 pieces of tan soft tissue measuring 0.5 x 0.3 x 0.2 cm.  Specimen is submitted entirely in 1 cassette.  F.  Specimen is received in 1 formalin filled container \"sigmoid polyp snare\"  and consists of 2 pieces of tan soft tissue measuring 0.4 x 0.3 x 0.2 cm.  Specimen is submitted entirely in 1 cassette.  MM    REVIEWED, DIAGNOSED AND ELECTRONICALLY  SIGNED BY:    Sp De M.D.  CPT CODES:  88305x6     Results for orders placed or performed during the hospital encounter of 06/04/18   Tilt Table   Result Value Ref Range    Target HR (85%) 132 bpm    Max. Pred. HR (100%) 155 bpm   Results for orders placed or performed during the hospital encounter of 05/24/18   Gold Top - SST   Result Value Ref Range    Extra Tube Hold for add-ons.    Green Top (Gel)   Result Value Ref Range    Extra Tube Hold for add-ons.    CK-MB    Specimen: Blood   Result Value Ref Range    CKMB 1.78 0.00 - 5.00 ng/mL   CBC Auto Differential    Specimen: Blood   Result Value Ref Range    WBC 7.78 3.20 - 9.80 10*3/mm3    RBC 4.59 3.77 - 5.16 10*6/mm3    Hemoglobin 13.7 12.0 - 15.5 g/dL "    Hematocrit 40.0 35.0 - 45.0 %    MCV 87.1 80.0 - 98.0 fL    MCH 29.8 26.5 - 34.0 pg    MCHC 34.3 31.4 - 36.0 g/dL    RDW 13.3 11.5 - 14.5 %    RDW-SD 42.4 36.4 - 46.3 fl    MPV 10.5 8.0 - 12.0 fL    Platelets 301 150 - 450 10*3/mm3    Neutrophil % 56.7 37.0 - 80.0 %    Lymphocyte % 28.4 10.0 - 50.0 %    Monocyte % 11.1 0.0 - 12.0 %    Eosinophil % 2.1 0.0 - 7.0 %    Basophil % 0.8 0.0 - 2.0 %    Immature Grans % 0.9 (H) 0.0 - 0.5 %    Neutrophils, Absolute 4.42 2.00 - 8.60 10*3/mm3    Lymphocytes, Absolute 2.21 0.60 - 4.20 10*3/mm3    Monocytes, Absolute 0.86 0.00 - 0.90 10*3/mm3    Eosinophils, Absolute 0.16 0.00 - 0.70 10*3/mm3    Basophils, Absolute 0.06 0.00 - 0.20 10*3/mm3    Immature Grans, Absolute 0.07 (H) 0.00 - 0.02 10*3/mm3   Lavender Top   Result Value Ref Range    Extra Tube hold for add-on    Lavender Top   Result Value Ref Range    Extra Tube hold for add-on    Light Blue Top   Result Value Ref Range    Extra Tube hold for add-on    Troponin    Specimen: Blood   Result Value Ref Range    Troponin I <0.012 <=0.034 ng/mL   Troponin    Specimen: Arm, Left; Blood   Result Value Ref Range    Troponin I <0.012 <=0.034 ng/mL   Troponin    Specimen: Blood   Result Value Ref Range    Troponin I <0.012 <=0.034 ng/mL   aPTT    Specimen: Blood   Result Value Ref Range    PTT 28.0 20.0 - 40.3 seconds   Protime-INR    Specimen: Blood   Result Value Ref Range    Protime 13.2 11.1 - 15.3 Seconds    INR 1.02 0.80 - 1.20   D-dimer, Quantitative    Specimen: Blood   Result Value Ref Range    D-Dimer, Quantitative 369 0 - 470 ng/mL (FEU)   CBC (No Diff)    Specimen: Blood   Result Value Ref Range    WBC 5.64 3.20 - 9.80 10*3/mm3    RBC 4.17 3.77 - 5.16 10*6/mm3    Hemoglobin 12.3 12.0 - 15.5 g/dL    Hematocrit 36.3 35.0 - 45.0 %    MCV 87.1 80.0 - 98.0 fL    MCH 29.5 26.5 - 34.0 pg    MCHC 33.9 31.4 - 36.0 g/dL    RDW 13.1 11.5 - 14.5 %    RDW-SD 41.9 36.4 - 46.3 fl    MPV 10.2 8.0 - 12.0 fL    Platelets 227 150 - 450  10*3/mm3   BNP    Specimen: Blood   Result Value Ref Range    proBNP 138.0 0.0 - 900.0 pg/mL   CK    Specimen: Blood   Result Value Ref Range    Creatine Kinase 106 30 - 135 U/L     *Note: Due to a large number of results and/or encounters for the requested time period, some results have not been displayed. A complete set of results can be found in Results Review.         This document has been electronically signed by Miguel Fiore MD on August 11, 2023 13:43 CDT

## 2023-08-13 LAB — BACTERIA SPEC AEROBE CULT: NO GROWTH

## 2023-08-15 LAB — REF LAB TEST METHOD: NORMAL

## 2023-08-21 ENCOUNTER — OFFICE VISIT (OUTPATIENT)
Dept: GASTROENTEROLOGY | Facility: CLINIC | Age: 68
End: 2023-08-21
Payer: MEDICARE

## 2023-08-21 VITALS
BODY MASS INDEX: 32.96 KG/M2 | SYSTOLIC BLOOD PRESSURE: 146 MMHG | DIASTOLIC BLOOD PRESSURE: 83 MMHG | HEIGHT: 67 IN | WEIGHT: 210 LBS

## 2023-08-21 DIAGNOSIS — R10.10 PAIN OF UPPER ABDOMEN: Primary | ICD-10-CM

## 2023-08-21 DIAGNOSIS — R93.89 ABNORMAL FINDINGS ON DIAGNOSTIC IMAGING OF OTHER SPECIFIED BODY STRUCTURES: ICD-10-CM

## 2023-08-21 RX ORDER — OCTISALATE, AVOBENZONE, HOMOSALATE, AND OCTOCRYLENE 29.4; 29.4; 49; 25.48 MG/ML; MG/ML; MG/ML; MG/ML
1 LOTION TOPICAL DAILY
Qty: 30 CAPSULE | Refills: 6 | Status: SHIPPED | OUTPATIENT
Start: 2023-08-21

## 2023-08-25 NOTE — PROGRESS NOTES
Chief Complaint   Patient presents with    Abdominal Pain     Endo f/u       Subjective    Maude Joyner is a 68 y.o. female.    History of Present Illness  Patient presented to GI clinic for follow-up visit today.  Has abdominal pain and bloating.  Denies nausea or vomiting.  Bowel movements regular.  Weight is stable.  EGD was consistent with hiatal hernia, esophagitis, gastric polyp and gastritis.  Path was consistent with reflux esophagitis and fundic gland polyp.         The following portions of the patient's history were reviewed and updated as appropriate:   Past Medical History:   Diagnosis Date    Cervical spinal stenosis     Dysuria     Fibromyalgia     GERD (gastroesophageal reflux disease)     Hyperlipidemia     Hypertension     Osteoporosis     Urinary tract infectious disease      Past Surgical History:   Procedure Laterality Date    BACK SURGERY      COLONOSCOPY      COLONOSCOPY N/A 6/9/2022    Procedure: COLONOSCOPY;  Surgeon: Miguel Fiore MD;  Location: [facility] ENDOSCOPY;  Service: Gastroenterology;  Laterality: N/A;    ENDOSCOPY N/A 6/9/2022    Procedure: ESOPHAGOGASTRODUODENOSCOPY;  Surgeon: Miguel Fiore MD;  Location: [facility] ENDOSCOPY;  Service: Gastroenterology;  Laterality: N/A;    ENDOSCOPY N/A 8/11/2023    Procedure: ESOPHAGOGASTRODUODENOSCOPY;  Surgeon: Miguel Fiore MD;  Location: [facility] ENDOSCOPY;  Service: Gastroenterology;  Laterality: N/A;    HYSTERECTOMY      KNEE ARTHROPLASTY      SINUS SURGERY       Family History   Problem Relation Age of Onset    Heart disease Mother     Hypertension Mother     Colon cancer Father     Cancer Father     Heart disease Father     Hypertension Father      OB History    No obstetric history on file.       Prior to Admission medications    Medication Sig Start Date End Date Taking? Authorizing Provider   acetaminophen (TYLENOL) 500 MG tablet Take 2 tablets by mouth 2 (Two) Times a Day.   Yes Provider, MD Yeimy   ascorbic acid  (VITAMIN C) 100 MG tablet Take 1 tablet by mouth Daily.   Yes Yeimy Bah MD   cholecalciferol (VITAMIN D3) 25 MCG (1000 UT) tablet Take 2 tablets by mouth Daily.   Yes Yeimy Bah MD   escitalopram (LEXAPRO) 20 MG tablet Take 1 tablet by mouth Daily. 8/6/18  Yes Yeimy Bah MD   fenofibrate (TRICOR) 145 MG tablet Take 1 tablet by mouth Daily.   Yes Yeimy Bah MD   irbesartan (AVAPRO) 150 MG tablet Take 1 tablet by mouth Daily. 1/11/19  Yes Robbie Boothe MD   Linzess 72 MCG capsule capsule Take 1 capsule by mouth Daily. 1/17/23  Yes Yeimy Bah MD   metoprolol succinate XL (TOPROL-XL) 100 MG 24 hr tablet Take 1 tablet by mouth every night at bedtime.  Patient taking differently: 1/2 tablet by mouth in A.M. and 1/2 tablet by mouth in P.M. 1/11/19  Yes Robbie Boothe MD   NIACIN PO Take 500 mg by mouth Daily.   Yes Yeimy Bah MD   omeprazole (priLOSEC) 40 MG capsule Take 1 capsule by mouth Daily. 5/27/22  Yes Miguel Fiore MD   simvastatin (ZOCOR) 20 MG tablet Take 1 tablet by mouth Daily. 3/24/22  Yes Yeimy Bah MD   temazepam (RESTORIL) 30 MG capsule Take 1 capsule by mouth every night at bedtime. 8/7/18  Yes Yeimy Bah MD   traMADol (ULTRAM) 50 MG tablet Take 1 tablet by mouth 2 (Two) Times a Day.   Yes Yeimy Bah MD   triamcinolone (KENALOG) 0.5 % cream Apply 1 application  topically to the appropriate area as directed 3 (Three) Times a Day.   Yes Yeimy Bah MD   Probiotic Product (Align) 4 MG capsule Take 1 capsule by mouth Daily. 8/21/23   Miguel Fiore MD     Allergies   Allergen Reactions    Ace Inhibitors Dizziness    Carafate [Sucralfate] Other (See Comments)     Abdominal Pain    Cat Hair Extract Unknown - Low Severity    Chocolate Flavor Unknown - Low Severity    Gabapentin Swelling    Gemfibrozil Swelling    Morphine Unknown - Low Severity    Oxycodone Itching    Strawberry  "Unknown - Low Severity    Sulfate Unknown - Low Severity     Other reaction(s): Rash    Tomato Unknown - Low Severity    Doxycycline Rash    Latex Rash    Morphine Sulfate Rash    Nickel Rash    Other Rash     Electrode adhesive- burning and rash      Sulfa Antibiotics Rash    Tapentadol Rash     Rash     Social History     Socioeconomic History    Marital status:    Tobacco Use    Smoking status: Former    Smokeless tobacco: Never   Vaping Use    Vaping Use: Never used   Substance and Sexual Activity    Alcohol use: No    Drug use: No    Sexual activity: Defer       Review of Systems  Review of Systems   Constitutional:  Negative for chills, fatigue, fever and unexpected weight change.   HENT:  Negative for congestion, ear discharge, hearing loss, nosebleeds and sore throat.    Eyes:  Negative for pain, discharge and redness.   Respiratory:  Negative for cough, chest tightness, shortness of breath and wheezing.    Cardiovascular:  Negative for chest pain and palpitations.   Gastrointestinal:  Positive for abdominal pain. Negative for abdominal distention, blood in stool, constipation, diarrhea, nausea and vomiting.   Endocrine: Negative for cold intolerance, polydipsia, polyphagia and polyuria.   Genitourinary:  Negative for dysuria, flank pain, frequency, hematuria and urgency.   Musculoskeletal:  Negative for arthralgias, back pain, joint swelling and myalgias.   Skin:  Negative for color change, pallor and rash.   Neurological:  Negative for tremors, seizures, syncope, weakness and headaches.   Hematological:  Negative for adenopathy. Does not bruise/bleed easily.   Psychiatric/Behavioral:  Negative for behavioral problems, confusion, dysphoric mood, hallucinations and suicidal ideas. The patient is not nervous/anxious.     Has bloating.  /83 (BP Location: Right arm)   Ht 170.2 cm (67\")   Wt 95.3 kg (210 lb)   LMP 05/01/2004 (Approximate)   BMI 32.89 kg/mý     Objective    Physical " Exam  Constitutional:       Appearance: She is well-developed.   HENT:      Head: Normocephalic and atraumatic.   Eyes:      Conjunctiva/sclera: Conjunctivae normal.      Pupils: Pupils are equal, round, and reactive to light.   Neck:      Thyroid: No thyromegaly.   Cardiovascular:      Rate and Rhythm: Normal rate and regular rhythm.      Heart sounds: Normal heart sounds. No murmur heard.  Pulmonary:      Effort: Pulmonary effort is normal.      Breath sounds: Normal breath sounds. No wheezing.   Abdominal:      General: Bowel sounds are normal. There is no distension.      Palpations: Abdomen is soft. There is no mass.      Tenderness: There is no abdominal tenderness.      Hernia: No hernia is present.   Genitourinary:     Comments: No lesions noted  Musculoskeletal:         General: No tenderness. Normal range of motion.      Cervical back: Normal range of motion and neck supple.   Lymphadenopathy:      Cervical: No cervical adenopathy.   Skin:     General: Skin is warm and dry.      Findings: No rash.   Neurological:      Mental Status: She is alert and oriented to person, place, and time.      Cranial Nerves: No cranial nerve deficit.   Psychiatric:         Thought Content: Thought content normal.     Admission on 2023, Discharged on 2023   Component Date Value Ref Range Status    Duodenal Culture 2023 No growth   Final    Reference Lab Report 2023    Final                    Value:[facility]  [address] [place] [address]  Phone: [phone] or [phone]  Fax: [phone]  Pete Gonzalez M.D., Medical Director    PATIENT NAME                                     LABORATORY NO.  1800   LÓPEZ LANGSTON.                                [id number]  [id number]                                 AGE                    SEX   N              CLIENT REF #  [facility]                   68        1955      F     [id number]       [id number]    [place]                               REQUESTING M.D.           ATTENDING M.D.         COPY TO.  [address] [place] [address]                         [place] [address]                     SUMALATHA  DATE COLLECTED            DATE RECEIVED          DATE REPORTED  08/11/2023                08/14/2023             08/15/2023    DIAGNOSIS:  A.     DUODENUM, BIOPSY:  Duodenal type mucosa with no significant histopathologic                           abnormalities  Negative for Celiac disease, metaplasia, microorganisms or atypia  B.     ANTRUM, BIOPSY:  Gastric mucosa with mild chronic gastritis  Negative for H. pylori, metaplasia , dysplasia or malignancy  C.     GASTRIC POLYPS:  Fundic gland polyp  Negative for H. pylori, metaplasia , dysplasia or malignancy  D.     GE JUNCTION:  Reflux esophagitis (no eosinophils identified)  Negative for intestinal metaplasia, dysplasia or malignancy    RLL    CLINICAL HISTORY:  Pain of upper abdomen  Bloating    SPECIMENS RECEIVED:  A.    DUODENUM, BIOPSY  B.    ANTRUM, BIOPSY  C.    GASTRIC POLYPS  D.    GE JUNCTION    MICROSCOPIC DESCRIPTION:  Tissue blocks are prepared and slides are examined microscopically on all  specimens. See diagnosis for details.    Professional interpretation rendered by Pete Gonzalez M.D., F.C.A.P. at  [facility], [employer], [address] [place] [address].    GROSS DESCRIPTION:  A.    Labeled duodenum are 2 portions of tan soft tissue measuring 0.3 x 0.2                           x  0.2 cm in aggregate, submitted entirely in 1 block.  MTH  B.    Labeled antrum biopsy are 3 portions of tan soft tissue measuring 0.5 x 0.3  x 0.2 cm in aggregate, submitted entirely in 1 block.  C.    Labeled gastric polyps are 2 portions of tan soft tissue measuring 0.3 x 0.2  x 0.2 cm in aggregate, submitted entirely in 1 block.  D.    Labeled GE junction is a 0.5 x 0.2 x 0.2 cm portion of tan soft tissue which  is  submitted entirely in 1 block.    REVIEWED, DIAGNOSED AND ELECTRONICALLY  SIGNED BY:    Pete Gonzalez M.D., F.C.A.P.  CPT CODES:  88305x4       Assessment & Plan      1. Pain of upper abdomen    2. Abnormal findings on diagnostic imaging of other specified body structures    1.  Epigastric pain with bloating, improving with intermittent symptoms.  Rule out pancreaticobiliary pathology and gastroparesis.  Obtain gastric emptying scan.  Continue PPI and add probiotics.  2.  GERD, well controlled.  Continue PPI and antireflux lifestyle.  3.  Constipation, continue MiraLAX and high-fiber diet.  4.  Diverticulosis, continue high-fiber diet.  5.  Colon polyps and family history of colon cancer, repeat colonoscopy in 3 years.  6.  Fatty liver disease, recommend exercise and diet control.  Repeat LFTs and TSH today.  7.  Obesity, recommend exercise and diet control.       Orders placed during this encounter include:  Orders Placed This Encounter   Procedures    NM Gastric Emptying     Standing Status:   Future     Standing Expiration Date:   8/21/2024     Order Specific Question:   Release to patient     Answer:   Routine Release [[id number]]    TSH     Order Specific Question:   Release to patient     Answer:   Routine Release [[id number]]       * Surgery not found *    Review and/or summary of lab tests, radiology, procedures, medications. Review and summary of old records and obtaining of history. The risks and benefits of my recommendations, as well as other treatment options were discussed with the patient today. Questions were answered.    New Medications Ordered This Visit   Medications    Probiotic Product (Align) 4 MG capsule     Sig: Take 1 capsule by mouth Daily.     Dispense:  30 capsule     Refill:  6       Follow-up: Return in about 1 month (around 9/21/2023).               Results for orders placed or performed during the hospital encounter of 08/11/23   TISSUE EXAM, [facility] (MICHELLE,COR,MAD)    Specimen:  A: Small Intestine, Duodenum; Tissue    B: Gastric, Antrum; Tissue    C: Gastric, Body; Tissue    D: GE Junction; Tissue   Result Value Ref Range    Reference Lab Report       [facility]  [address] [place] [address]  Phone: [phone] or [phone]  Fax: [phone]  Pete Gonzalez M.D., Medical Director    PATIENT NAME                                     LABORATORY NO.  1800   LÓPEZ LANGSTON.                                [id number]  [id number]                                 AGE                    SEX   SSN              CLIENT REF #  [facility]                   68        1955      F     [id number]      [id number]    [place]                               REQUESTING M.D.           ATTENDING M.D.         COPY TO.  [address] [place] [address]                         EZIO PRITCHARD JAYNA  [address]  DATE COLLECTED            DATE RECEIVED          DATE REPORTED  2023                2023             08/15/2023    DIAGNOSIS:  A.     DUODENUM, BIOPSY:  Duodenal type mucosa with no significant histopathologic  abnormalities  Negative for Celiac disease, metaplasia, microorganisms or atypia  B.     ANTRUM, BIOPSY:  Gastric mucosa with mild chronic gastritis  Negative for H. pylori, metaplasia , dysplasia or malignancy  C.     GASTRIC POLYPS:  Fundic gland polyp  Negative for H. pylori, metaplasia , dysplasia or malignancy  D.     GE JUNCTION:  Reflux esophagitis (no eosinophils identified)  Negative for intestinal metaplasia, dysplasia or malignancy    RLL    CLINICAL HISTORY:  Pain of upper abdomen  Bloating    SPECIMENS RECEIVED:  A.    DUODENUM, BIOPSY  B.    ANTRUM, BIOPSY  C.    GASTRIC POLYPS  D.    GE JUNCTION    MICROSCOPIC DESCRIPTION:  Tissue blocks are prepared and slides are examined microscopically on all  specimens. See diagnosis for details.    Professional  interpretation rendered by Pete Gonzalez M.D., TIFFANIE at  [employer], [employer], [address] [place] [address], [place] [address].    GROSS DESCRIPTION:  A.    Labeled duodenum are 2 portions of tan soft tissue measuring 0.3 x 0.2  x  0.2 cm in aggregate, submitted entirely in 1 block.  MTH  B.    Labeled antrum biopsy are 3 portions of tan soft tissue measuring 0.5 x 0.3  x 0.2 cm in aggregate, submitted entirely in 1 block.  C.    Labeled gastric polyps are 2 portions of tan soft tissue measuring 0.3 x 0.2  x 0.2 cm in aggregate, submitted entirely in 1 block.  D.    Labeled GE junction is a 0.5 x 0.2 x 0.2 cm portion of tan soft tissue which  is submitted entirely in 1 block.    REVIEWED, DIAGNOSED AND ELECTRONICALLY  SIGNED BY:    Pete Gonzalez M.D., TIFFANIE  CPT CODES:  88305x4     Duodenal Culture, Quantitative - Body Fluid, Small Intestine, Duodenum    Specimen: Small Intestine, Duodenum; Body Fluid   Result Value Ref Range    Duodenal Culture No growth    Results for orders placed or performed in visit on 07/31/23   Hepatic Function Panel    Specimen: Blood   Result Value Ref Range    Total Protein 7.2 6.0 - 8.5 g/dL    Albumin 4.3 3.5 - 5.2 g/dL    ALT (SGPT) 18 1 - 33 U/L    AST (SGOT) 25 1 - 32 U/L    Alkaline Phosphatase 64 39 - 117 U/L    Total Bilirubin 0.5 0.0 - 1.2 mg/dL    Bilirubin, Direct <0.2 0.0 - 0.3 mg/dL    Bilirubin, Indirect     Results for orders placed or performed in visit on 11/09/22   Alpha-Gal IgE Panel    Specimen: Blood   Result Value Ref Range    Class Description Comment     IgE 28 6 - 495 IU/mL    [id number] Alpha-Gal <0.10 Class 0 kU/L    Beef <0.10 Class 0 kU/L    Pork <0.10 Class 0 kU/L    Lamb <0.10 Class 0 kU/L   Results for orders placed or performed in visit on 11/09/22   Hepatic Function Panel    Specimen: Blood   Result Value Ref Range    Total Protein 7.4 6.0 - 8.5 g/dL    Albumin 4.10 3.50 - 5.20 g/dL    ALT (SGPT) 15 1 - 33 U/L    AST (SGOT) 30 1 - 32 U/L    Alkaline  Phosphatase 61 39 - 117 U/L    Total Bilirubin 0.4 0.0 - 1.2 mg/dL    Bilirubin, Direct <0.2 0.0 - 0.3 mg/dL    Bilirubin, Indirect     Results for orders placed or performed in visit on 08/23/22   BUN    Specimen: Blood   Result Value Ref Range    BUN 33 (H) 8 - 23 mg/dL   Creatinine, Serum    Specimen: Blood   Result Value Ref Range    Creatinine 1.22 (H) 0.57 - 1.00 mg/dL    eGFR 48.1 (L) >60.0 mL/min/1.73   Results for orders placed or performed in visit on 07/11/22   Hepatitis Panel, Acute    Specimen: Blood   Result Value Ref Range    Hepatitis B Surface Ag Non-Reactive Non-Reactive    Hep A IgM Non-Reactive Non-Reactive    Hep B C IgM Non-Reactive Non-Reactive    Hepatitis C Ab Non-Reactive Non-Reactive   Comprehensive Metabolic Panel    Specimen: Blood   Result Value Ref Range    Glucose 100 (H) 65 - 99 mg/dL    BUN 27 (H) 8 - 23 mg/dL    Creatinine 1.42 (H) 0.57 - 1.00 mg/dL    Sodium 140 136 - 145 mmol/L    Potassium 4.5 3.5 - 5.2 mmol/L    Chloride 104 98 - 107 mmol/L    CO2 26.0 22.0 - 29.0 mmol/L    Calcium 9.9 8.6 - 10.5 mg/dL    Total Protein 7.3 6.0 - 8.5 g/dL    Albumin 4.70 3.50 - 5.20 g/dL    ALT (SGPT) 25 1 - 33 U/L    AST (SGOT) 33 (H) 1 - 32 U/L    Alkaline Phosphatase 69 39 - 117 U/L    Total Bilirubin 0.6 0.0 - 1.2 mg/dL    Globulin 2.6 gm/dL    A/G Ratio 1.8 g/dL    BUN/Creatinine Ratio 19.0 7.0 - 25.0    Anion Gap 10.0 5.0 - 15.0 mmol/L    eGFR 40.1 (L) >60.0 mL/min/1.73   Results for orders placed or performed during the hospital encounter of 06/09/22   TISSUE EXAM, [facility] (MICHELLE,COR,[facility]AD)    Specimen: A: Small Intestine, Duodenum; Tissue    B: Gastric, Antrum; Tissue    C: Gastric, Body; Tissue    D: Esophagus; Tissue    E: Large Intestine, Transverse Colon; Tissue    F: Large Intestine, Sigmoid Colon; Tissue   Result Value Ref Range    Reference Lab Report       [facility]  [address] [place] [address]  Phone: [phone] or [phone]  Fax:  [phone]  Pete Gonzalez M.D., Medical Director    PATIENT NAME                           LABORATORY NO.  1800  LÓPEZ LANGSTON.                      [id number]  [id number]                         AGE              SEX  N           CLIENT REF #  [facility]           67      1955  F    [id number]   [id number]    [place]                       REQUESTING M.D.     ATTENDING M.D.     COPY TO.  [address] [place] [address]                 EZIO PRITCHARD JAYNA  [address]  DATE COLLECTED      DATE RECEIVED      DATE REPORTED  2022          06/10/2022         2022    DIAGNOSIS:  A.   DUODENUM, BIOPSY:  No significant histologic abnormality  B.   ANTRUM, BIOPSY:  Reactive gastropathy  C.   GASTRIC POLYPS:  Fundic gland polyps  D.   GE JUNCTION:  Reactive gastric and squamous mucosa  Negative  for specialized Alcantar's mucosa  E.   TRANSVERSE COLON POLYP:  Tubular adenoma  F.   SIGMOID COLON POLYP:  Tubular adenoma    JBS/pah    CLINICAL HISTORY:  Generalized abdominal pain  Nausea  Other constipation    SPECIMENS RECEIVED:  A.  DUODENUM, BIOPSY  B.  ANTRUM, BIOPSY  C.  GASTRIC POLYPS  D.  GE JUNCTION  E.  TRANSVERSE COLON POLYP  F.  SIGMOID COLON POLYP    MICROSCOPIC DESCRIPTION:  Tissue blocks are prepared and slides are examined microscopically on all  specimens. See diagnosis for details.    Professional interpretation rendered by Sp De M.D. at [employer], [address] [place] [address].    GROSS DESCRIPTION:  A.  Specimen is received in 1 formalin filled container \"duodenal biopsy\"  consists of a single piece of tan soft tissue measuring 0.3 x 0.2 x 0.2 cm.  Specimen is submitted entirely in 1 cassette.  B.  Specimen is received in 1 formalin filled container \"antrum biopsy\" and  consists of 2 pieces of tan soft tissue measuring 0.3 x 0.2 x  0.2 cm.  Specimen is submitted entirely in 1 cassette.  C.  " "Specimen is received in 1 formalin filled container \"gastric polyps\" and  consists of 2 pieces of tan soft tissue measuring 0.4 x 0.2 x 0.1 cm.  Specimen is submitted entirely in 1 cassette.  D.    Specimen is received in 1 formalin filled container \"GE junction\" and  consists of a single piece of tan soft tissue measuring 0.3 x 0.3 x 0.1 cm.  Specimen is submitted entirely in 1 cassette.  E.  Specimen is received in 1 formalin filled container \"transverse polyp\" and  consists of 3 pieces of tan soft tissue measuring 0.5 x 0.3 x 0.2 cm.  Specimen is submitted entirely in 1 cassette.  F.  Specimen is received in 1 formalin filled container \"sigmoid polyp snare\"  and consists of 2 pieces of tan soft tissue measuring 0.4 x 0.3 x 0.2 cm.  Specimen is submitted entirely in 1 cassette.  MM    REVIEWED, DIAGNOSED AND ELECTRONICALLY  SIGNED BY:    Sp De M.D.  CPT CODES:  88305x6     Results for orders placed or performed during the hospital encounter of 06/04/18   Tilt Table   Result Value Ref Range    Target HR (85%) 132 bpm    Max. Pred. HR (100%) 155 bpm   Results for orders placed or performed during the hospital encounter of 05/24/18   Gold Top - SST   Result Value Ref Range    Extra Tube Hold for add-ons.    Green Top (Gel)   Result Value Ref Range    Extra Tube Hold for add-ons.    CK-MB    Specimen: Blood   Result Value Ref Range    CKMB 1.78 0.00 - 5.00 ng/mL   CBC Auto Differential    Specimen: Blood   Result Value Ref Range    WBC 7.78 3.20 - 9.80 10*3/mm3    RBC 4.59 3.77 - 5.16 10*6/mm3    Hemoglobin 13.7 12.0 - 15.5 g/dL    Hematocrit 40.0 35.0 - 45.0 %    MCV 87.1 80.0 - 98.0 fL    MCH 29.8 26.5 - 34.0 pg    MCHC 34.3 31.4 - 36.0 g/dL    RDW 13.3 11.5 - 14.5 %    RDW-SD 42.4 36.4 - 46.3 fl    MPV 10.5 8.0 - 12.0 fL    Platelets 301 150 - 450 10*3/mm3    Neutrophil % 56.7 37.0 - 80.0 %    Lymphocyte % 28.4 10.0 - 50.0 %    Monocyte % 11.1 0.0 - 12.0 %    Eosinophil % 2.1 0.0 - 7.0 %    Basophil % " 0.8 0.0 - 2.0 %    Immature Grans % 0.9 (H) 0.0 - 0.5 %    Neutrophils, Absolute 4.42 2.00 - 8.60 10*3/mm3    Lymphocytes, Absolute 2.21 0.60 - 4.20 10*3/mm3    Monocytes, Absolute 0.86 0.00 - 0.90 10*3/mm3    Eosinophils, Absolute 0.16 0.00 - 0.70 10*3/mm3    Basophils, Absolute 0.06 0.00 - 0.20 10*3/mm3    Immature Grans, Absolute 0.07 (H) 0.00 - 0.02 10*3/mm3   Lavender Top   Result Value Ref Range    Extra Tube hold for add-on    Lavender Top   Result Value Ref Range    Extra Tube hold for add-on    Light Blue Top   Result Value Ref Range    Extra Tube hold for add-on    Troponin    Specimen: Blood   Result Value Ref Range    Troponin I <0.012 <=0.034 ng/mL   Troponin    Specimen: Arm, Left; Blood   Result Value Ref Range    Troponin I <0.012 <=0.034 ng/mL   Troponin    Specimen: Blood   Result Value Ref Range    Troponin I <0.012 <=0.034 ng/mL   aPTT    Specimen: Blood   Result Value Ref Range    PTT 28.0 20.0 - 40.3 seconds   Protime-INR    Specimen: Blood   Result Value Ref Range    Protime 13.2 11.1 - 15.3 Seconds    INR 1.02 0.80 - 1.20   D-dimer, Quantitative    Specimen: Blood   Result Value Ref Range    D-Dimer, Quantitative 369 0 - 470 ng/mL (FEU)   CBC (No Diff)    Specimen: Blood   Result Value Ref Range    WBC 5.64 3.20 - 9.80 10*3/mm3    RBC 4.17 3.77 - 5.16 10*6/mm3    Hemoglobin 12.3 12.0 - 15.5 g/dL    Hematocrit 36.3 35.0 - 45.0 %    MCV 87.1 80.0 - 98.0 fL    MCH 29.5 26.5 - 34.0 pg    MCHC 33.9 31.4 - 36.0 g/dL    RDW 13.1 11.5 - 14.5 %    RDW-SD 41.9 36.4 - 46.3 fl    MPV 10.2 8.0 - 12.0 fL    Platelets 227 150 - 450 10*3/mm3     *Note: Due to a large number of results and/or encounters for the requested time period, some results have not been displayed. A complete set of results can be found in Results Review.         This document has been electronically signed by Miguel Fiore MD on August 25, 2023 17:31 CDT

## 2023-09-18 ENCOUNTER — APPOINTMENT (OUTPATIENT)
Dept: GENERAL RADIOLOGY | Facility: HOSPITAL | Age: 68
End: 2023-09-18
Payer: MEDICARE

## 2023-09-18 ENCOUNTER — HOSPITAL ENCOUNTER (EMERGENCY)
Facility: HOSPITAL | Age: 68
Discharge: HOME | End: 2023-09-18
Attending: EMERGENCY MEDICINE | Admitting: EMERGENCY MEDICINE
Payer: MEDICARE

## 2023-09-18 VITALS
DIASTOLIC BLOOD PRESSURE: 63 MMHG | RESPIRATION RATE: 16 BRPM | SYSTOLIC BLOOD PRESSURE: 119 MMHG | TEMPERATURE: 97.9 F | HEIGHT: 67 IN | HEART RATE: 64 BPM | OXYGEN SATURATION: 95 % | BODY MASS INDEX: 32.96 KG/M2 | WEIGHT: 210 LBS

## 2023-09-18 DIAGNOSIS — F12.10 MILD TETRAHYDROCANNABINOL (THC) ABUSE: ICD-10-CM

## 2023-09-18 DIAGNOSIS — R07.89 ATYPICAL CHEST PAIN: Primary | ICD-10-CM

## 2023-09-18 LAB
ALBUMIN SERPL-MCNC: 4 G/DL (ref 3.5–5.2)
ALBUMIN/GLOB SERPL: 1.5 G/DL
ALP SERPL-CCNC: 62 U/L (ref 39–117)
ALT SERPL W P-5'-P-CCNC: 15 U/L (ref 1–33)
AMPHET+METHAMPHET UR QL: NEGATIVE
AMPHETAMINES UR QL: NEGATIVE
ANION GAP SERPL CALCULATED.3IONS-SCNC: 11 MMOL/L (ref 5–15)
AST SERPL-CCNC: 22 U/L (ref 1–32)
BARBITURATES UR QL SCN: NEGATIVE
BASOPHILS # BLD AUTO: 0.06 10*3/MM3 (ref 0–0.2)
BASOPHILS NFR BLD AUTO: 0.8 % (ref 0–1.5)
BENZODIAZ UR QL SCN: POSITIVE
BILIRUB SERPL-MCNC: 0.4 MG/DL (ref 0–1.2)
BUN SERPL-MCNC: 27 MG/DL (ref 8–23)
BUN/CREAT SERPL: 22.3 (ref 7–25)
BUPRENORPHINE SERPL-MCNC: NEGATIVE NG/ML
CALCIUM SPEC-SCNC: 9.7 MG/DL (ref 8.6–10.5)
CANNABINOIDS SERPL QL: POSITIVE
CHLORIDE SERPL-SCNC: 104 MMOL/L (ref 98–107)
CO2 SERPL-SCNC: 23 MMOL/L (ref 22–29)
COCAINE UR QL: NEGATIVE
CREAT SERPL-MCNC: 1.21 MG/DL (ref 0.57–1)
DEPRECATED RDW RBC AUTO: 39.1 FL (ref 37–54)
EGFRCR SERPLBLD CKD-EPI 2021: 48.3 ML/MIN/1.73
EOSINOPHIL # BLD AUTO: 0.07 10*3/MM3 (ref 0–0.4)
EOSINOPHIL NFR BLD AUTO: 1 % (ref 0.3–6.2)
ERYTHROCYTE [DISTWIDTH] IN BLOOD BY AUTOMATED COUNT: 12.9 % (ref 12.3–15.4)
FENTANYL UR-MCNC: NEGATIVE NG/ML
GEN 5 2HR TROPONIN T REFLEX: 8 NG/L
GLOBULIN UR ELPH-MCNC: 2.7 GM/DL
GLUCOSE SERPL-MCNC: 123 MG/DL (ref 65–99)
HCT VFR BLD AUTO: 38.1 % (ref 34–46.6)
HGB BLD-MCNC: 13 G/DL (ref 12–15.9)
HOLD SPECIMEN: NORMAL
HOLD SPECIMEN: NORMAL
IMM GRANULOCYTES # BLD AUTO: 0.06 10*3/MM3 (ref 0–0.05)
IMM GRANULOCYTES NFR BLD AUTO: 0.8 % (ref 0–0.5)
LYMPHOCYTES # BLD AUTO: 1.33 10*3/MM3 (ref 0.7–3.1)
LYMPHOCYTES NFR BLD AUTO: 18.8 % (ref 19.6–45.3)
MCH RBC QN AUTO: 28.7 PG (ref 26.6–33)
MCHC RBC AUTO-ENTMCNC: 34.1 G/DL (ref 31.5–35.7)
MCV RBC AUTO: 84.1 FL (ref 79–97)
METHADONE UR QL SCN: NEGATIVE
MONOCYTES # BLD AUTO: 0.56 10*3/MM3 (ref 0.1–0.9)
MONOCYTES NFR BLD AUTO: 7.9 % (ref 5–12)
NEUTROPHILS NFR BLD AUTO: 4.99 10*3/MM3 (ref 1.7–7)
NEUTROPHILS NFR BLD AUTO: 70.7 % (ref 42.7–76)
NRBC BLD AUTO-RTO: 0 /100 WBC (ref 0–0.2)
NT-PROBNP SERPL-MCNC: 585 PG/ML (ref 0–900)
OPIATES UR QL: NEGATIVE
OXYCODONE UR QL SCN: NEGATIVE
PCP UR QL SCN: NEGATIVE
PLATELET # BLD AUTO: 238 10*3/MM3 (ref 140–450)
PMV BLD AUTO: 10.6 FL (ref 6–12)
POTASSIUM SERPL-SCNC: 4.1 MMOL/L (ref 3.5–5.2)
PROPOXYPH UR QL: NEGATIVE
PROT SERPL-MCNC: 6.7 G/DL (ref 6–8.5)
QT INTERVAL: 410 MS
QTC INTERVAL: 439 MS
RBC # BLD AUTO: 4.53 10*6/MM3 (ref 3.77–5.28)
SODIUM SERPL-SCNC: 138 MMOL/L (ref 136–145)
TRICYCLICS UR QL SCN: NEGATIVE
TROPONIN T DELTA: 1 NG/L
TROPONIN T SERPL HS-MCNC: 7 NG/L
WBC NRBC COR # BLD: 7.07 10*3/MM3 (ref 3.4–10.8)
WHOLE BLOOD HOLD COAG: NORMAL
WHOLE BLOOD HOLD SPECIMEN: NORMAL

## 2023-09-18 PROCEDURE — 25010000002 LORAZEPAM PER 2 MG: Performed by: EMERGENCY MEDICINE

## 2023-09-18 PROCEDURE — 99284 EMERGENCY DEPT VISIT MOD MDM: CPT

## 2023-09-18 PROCEDURE — 36415 COLL VENOUS BLD VENIPUNCTURE: CPT

## 2023-09-18 PROCEDURE — 83880 ASSAY OF NATRIURETIC PEPTIDE: CPT

## 2023-09-18 PROCEDURE — 80307 DRUG TEST PRSMV CHEM ANLYZR: CPT | Performed by: EMERGENCY MEDICINE

## 2023-09-18 PROCEDURE — 96374 THER/PROPH/DIAG INJ IV PUSH: CPT

## 2023-09-18 PROCEDURE — 84484 ASSAY OF TROPONIN QUANT: CPT

## 2023-09-18 PROCEDURE — 80053 COMPREHEN METABOLIC PANEL: CPT

## 2023-09-18 PROCEDURE — 85025 COMPLETE CBC W/AUTO DIFF WBC: CPT

## 2023-09-18 PROCEDURE — 71045 X-RAY EXAM CHEST 1 VIEW: CPT

## 2023-09-18 PROCEDURE — 93005 ELECTROCARDIOGRAM TRACING: CPT

## 2023-09-18 PROCEDURE — 93005 ELECTROCARDIOGRAM TRACING: CPT | Performed by: EMERGENCY MEDICINE

## 2023-09-18 PROCEDURE — 84484 ASSAY OF TROPONIN QUANT: CPT | Performed by: EMERGENCY MEDICINE

## 2023-09-18 RX ORDER — ACETAMINOPHEN 500 MG
1000 TABLET ORAL ONCE
Status: COMPLETED | OUTPATIENT
Start: 2023-09-18 | End: 2023-09-18

## 2023-09-18 RX ORDER — ALUMINA, MAGNESIA, AND SIMETHICONE 2400; 2400; 240 MG/30ML; MG/30ML; MG/30ML
15 SUSPENSION ORAL ONCE
Status: COMPLETED | OUTPATIENT
Start: 2023-09-18 | End: 2023-09-18

## 2023-09-18 RX ORDER — ASPIRIN 325 MG
325 TABLET ORAL ONCE
Status: DISCONTINUED | OUTPATIENT
Start: 2023-09-18 | End: 2023-09-18

## 2023-09-18 RX ORDER — LIDOCAINE HYDROCHLORIDE 20 MG/ML
15 SOLUTION OROPHARYNGEAL ONCE
Status: COMPLETED | OUTPATIENT
Start: 2023-09-18 | End: 2023-09-18

## 2023-09-18 RX ORDER — LORAZEPAM 2 MG/ML
1 INJECTION INTRAMUSCULAR ONCE
Status: COMPLETED | OUTPATIENT
Start: 2023-09-18 | End: 2023-09-18

## 2023-09-18 RX ORDER — SODIUM CHLORIDE 0.9 % (FLUSH) 0.9 %
10 SYRINGE (ML) INJECTION AS NEEDED
Status: DISCONTINUED | OUTPATIENT
Start: 2023-09-18 | End: 2023-09-19 | Stop reason: HOSPADM

## 2023-09-18 RX ADMIN — ACETAMINOPHEN 1000 MG: 500 TABLET, FILM COATED ORAL at 20:36

## 2023-09-18 RX ADMIN — LORAZEPAM 1 MG: 2 INJECTION INTRAMUSCULAR; INTRAVENOUS at 20:35

## 2023-09-18 RX ADMIN — LIDOCAINE HYDROCHLORIDE 15 ML: 20 SOLUTION ORAL; TOPICAL at 20:36

## 2023-09-18 RX ADMIN — ALUMINUM HYDROXIDE, MAGNESIUM HYDROXIDE, AND DIMETHICONE 15 ML: 400; 400; 40 SUSPENSION ORAL at 20:36

## 2023-09-18 RX ADMIN — SODIUM CHLORIDE, POTASSIUM CHLORIDE, SODIUM LACTATE AND CALCIUM CHLORIDE 1000 ML: 600; 310; 30; 20 INJECTION, SOLUTION INTRAVENOUS at 21:36

## 2023-09-19 NOTE — ED PROVIDER NOTES
Subjective   History of Present Illness  68 years old female with history of hypertension, hyperlipidemia, GERD with esophagitis, fibromyalgia, anxiety presented in the ER with chief complaint of chest pain and shortness of breath with worsening anxiety.  Patient reports she took a cookie that has 50 mg of THC and 50 mg of CBD/delta 9 around 2 PM this afternoon and around 4 PM she started to have feeling anxious, restless, off-and-on substernal chest discomfort which last for few seconds to a minute and improves on its own.  Patient is shaky.  She is having trouble thinking straight.  Patient denies any history of THC or alcohol/substance abuse.  She does have a history of esophagitis and scheduled to have some procedure done tomorrow with GI.    EKG showed sinus rhythm with no acute ST elevations.    History provided by:  Patient, EMS personnel and relative    Review of Systems   Constitutional:  Positive for fatigue. Negative for chills and fever.   HENT:  Negative for congestion, facial swelling and rhinorrhea.    Respiratory:  Positive for shortness of breath. Negative for cough and chest tightness.    Cardiovascular:  Positive for chest pain and palpitations.   Gastrointestinal:  Negative for abdominal pain, nausea and vomiting.   Genitourinary:  Negative for flank pain.   Musculoskeletal:  Negative for joint swelling.   Skin:  Negative for color change.   Neurological:  Negative for speech difficulty and headaches.   Psychiatric/Behavioral:  Positive for confusion. The patient is nervous/anxious.      Past Medical History:   Diagnosis Date    Cervical spinal stenosis     Dysuria     Fibromyalgia     GERD (gastroesophageal reflux disease)     Hyperlipidemia     Hypertension     Osteoporosis     Urinary tract infectious disease        Allergies   Allergen Reactions    Ace Inhibitors Dizziness    Carafate [Sucralfate] Other (See Comments)     Abdominal Pain    Cat Hair Extract Unknown - Low Severity    Chocolate  Flavor Unknown - Low Severity    Gabapentin Swelling    Gemfibrozil Swelling    Morphine Unknown - Low Severity    Oxycodone Itching    Strawberry Unknown - Low Severity    Sulfate Unknown - Low Severity     Other reaction(s): Rash    Tomato Unknown - Low Severity    Doxycycline Rash    Latex Rash    Morphine Sulfate Rash    Nickel Rash    Other Rash     Electrode adhesive- burning and rash      Sulfa Antibiotics Rash    Tapentadol Rash     Rash       Past Surgical History:   Procedure Laterality Date    BACK SURGERY      COLONOSCOPY      COLONOSCOPY N/A 6/9/2022    Procedure: COLONOSCOPY;  Surgeon: Miguel Fiore MD;  Location: [facility] ENDOSCOPY;  Service: Gastroenterology;  Laterality: N/A;    ENDOSCOPY N/A 6/9/2022    Procedure: ESOPHAGOGASTRODUODENOSCOPY;  Surgeon: Miguel Fiore MD;  Location: [facility] ENDOSCOPY;  Service: Gastroenterology;  Laterality: N/A;    ENDOSCOPY N/A 8/11/2023    Procedure: ESOPHAGOGASTRODUODENOSCOPY;  Surgeon: Miguel Fiore MD;  Location: [facility] ENDOSCOPY;  Service: Gastroenterology;  Laterality: N/A;    HYSTERECTOMY      KNEE ARTHROPLASTY      SINUS SURGERY         Family History   Problem Relation Age of Onset    Heart disease Mother     Hypertension Mother     Colon cancer Father     Cancer Father     Heart disease Father     Hypertension Father        Social History     Socioeconomic History    Marital status:    Tobacco Use    Smoking status: Former    Smokeless tobacco: Never   Vaping Use    Vaping Use: Never used   Substance and Sexual Activity    Alcohol use: No    Drug use: No    Sexual activity: Defer           Objective   Physical Exam  Vitals and nursing note reviewed.   Constitutional:       Appearance: She is well-developed.   HENT:      Head: Normocephalic and atraumatic.   Cardiovascular:      Rate and Rhythm: Normal rate and regular rhythm.      Heart sounds: Normal heart sounds.   Pulmonary:      Effort: Pulmonary effort is normal.      Breath sounds:  Normal breath sounds.   Abdominal:      General: Bowel sounds are normal.      Palpations: Abdomen is soft.      Tenderness: There is no abdominal tenderness.   Musculoskeletal:         General: Normal range of motion.      Cervical back: Normal range of motion and neck supple.   Skin:     General: Skin is warm.      Capillary Refill: Capillary refill takes less than 2 seconds.   Neurological:      General: No focal deficit present.      Mental Status: She is alert and oriented to person, place, and time. She is disoriented.      Cranial Nerves: No cranial nerve deficit.      Motor: No weakness, abnormal muscle tone or pronator drift.      Coordination: Finger-Nose-Finger Test and Heel to Shin Test normal.      Deep Tendon Reflexes: Babinski sign absent on the right side. Babinski sign absent on the left side.      Reflex Scores:       Bicep reflexes are 2+ on the right side and 2+ on the left side.       Patellar reflexes are 2+ on the right side and 2+ on the left side.  Psychiatric:         Mood and Affect: Mood is anxious.       ECG 12 Lead ED Triage Standing Order; Chest Pain      Date/Time: 9/18/2023 8:38 PM  Performed by: Jimbo Anthony MD  Authorized by: Jimbo Anthony MD   Interpreted by physician  Rhythm: sinus rhythm  Rate: normal  BPM: 69  QRS axis: normal  T Waves: T waves normal  Clinical impression: abnormal ECG  Comments: Diffuse Q waves             ED Course                                           Medical Decision Making  68 years old female with history of hypertension, hyperlipidemia, GERD with esophagitis, fibromyalgia, anxiety presented in the ER with chief complaint of chest pain and shortness of breath with worsening anxiety.  Patient reports she took a cookie that has 50 mg of THC and 50 mg of CBD/delta 9 around 2 PM this afternoon and around 4 PM she started to have feeling anxious, restless, off-and-on substernal chest discomfort which last for few seconds to a minute and improves on its  own.  Patient is shaky.  She is having trouble thinking straight.  Patient denies any history of THC or alcohol/substance abuse.  She does have a history of esophagitis and scheduled to have some procedure done tomorrow with GI.    EKG showed sinus rhythm with no acute ST elevations.  Patient is very anxious, her chest pain is more of his spasms, anxiety related.  She is having tremors and some confusion.    Problems Addressed:  Atypical chest pain: complicated acute illness or injury  Mild tetrahydrocannabinol (THC) abuse: complicated acute illness or injury    Amount and/or Complexity of Data Reviewed  Labs: ordered.  Radiology: ordered.  ECG/medicine tests: ordered and independent interpretation performed.    Risk  OTC drugs.  Prescription drug management.      Labs Reviewed   COMPREHENSIVE METABOLIC PANEL - Abnormal; Notable for the following components:       Result Value    Glucose 123 (*)     BUN 27 (*)     Creatinine 1.21 (*)     eGFR 48.3 (*)     All other components within normal limits    Narrative:     GFR Normal >60  Chronic Kidney Disease <60  Kidney Failure <15     CBC WITH AUTO DIFFERENTIAL - Abnormal; Notable for the following components:    Lymphocyte % 18.8 (*)     Immature Grans % 0.8 (*)     Immature Grans, Absolute 0.06 (*)     All other components within normal limits   URINE DRUG SCREEN - Abnormal; Notable for the following components:    THC, Screen, Urine Positive (*)     Benzodiazepine Screen, Urine Positive (*)     All other components within normal limits    Narrative:     Cutoff For Drugs Screened:    Amphetamines               500 ng/ml  Barbiturates               200 ng/ml  Benzodiazepines            150 ng/ml  Cocaine                    150 ng/ml  Methadone                  200 ng/ml  Opiates                    100 ng/ml  Phencyclidine               25 ng/ml  THC                            50 ng/ml  Methamphetamine            500 ng/ml  Tricyclic Antidepressants  300 ng/ml  Oxycodone                   100 ng/ml  Propoxyphene               300 ng/ml  Buprenorphine               10 ng/ml    The normal value for all drugs tested is negative. This report includes unconfirmed screening results, with the cutoff values listed, to be used for medical treatment purposes only.  Unconfirmed results must not be used for non-medical purposes such as employment or legal testing.  Clinical consideration should be applied to any drug of abuse test, particularly when unconfirmed results are used.     TROPONIN - Normal    Narrative:     High Sensitive Troponin T Reference Range:  <10.0 ng/L- Negative Female for AMI  <15.0 ng/L- Negative Male for AMI  >=10 - Abnormal Female indicating possible myocardial injury.  >=15 - Abnormal Male indicating possible myocardial injury.   Clinicians would have to utilize clinical acumen, EKG, Troponin, and serial changes to determine if it is an Acute Myocardial Infarction or myocardial injury due to an underlying chronic condition.        BNP (IN-HOUSE) - Normal    Narrative:     Among patients with dyspnea, NT-proBNP is highly sensitive for the detection of acute congestive heart failure. In addition NT-proBNP of <300 pg/ml effectively rules out acute congestive heart failure with 99% negative predictive value.     HIGH SENSITIVITIY TROPONIN T 2HR - Normal    Narrative:     High Sensitive Troponin T Reference Range:  <10.0 ng/L- Negative Female for AMI  <15.0 ng/L- Negative Male for AMI  >=10 - Abnormal Female indicating possible myocardial injury.  >=15 - Abnormal Male indicating possible myocardial injury.   Clinicians would have to utilize clinical acumen, EKG, Troponin, and serial changes to determine if it is an Acute Myocardial Infarction or myocardial injury due to an underlying chronic condition.        FENTANYL, URINE - Normal    Narrative:     Negative Threshold:      Fentanyl 5 ng/mL     The normal value for the drug tested is negative. This report includes final  unconfirmed screening results to be used for medical treatment purposes only. Unconfirmed results must not be used for non-medical purposes such as employment or legal testing. Clinical consideration should be applied to any drug of abuse test, particularly when unconfirmed results are used.          RAINBOW DRAW    Narrative:     The following orders were created for panel order [place] Draw.  Procedure                               Abnormality         Status                     ---------                               -----------         ------                     Green Top (Gel)[[id number]]                                  Final result               Lavender Top[[id number]]                                     Final result               Gold Top - SST[[id number]]                                   Final result               Light Blue Top[[id number]]                                   Final result                 Please view results for these tests on the individual orders.   CBC AND DIFFERENTIAL    Narrative:     The following orders were created for panel order CBC & Differential.  Procedure                               Abnormality         Status                     ---------                               -----------         ------                     CBC Auto Differential[[id number]]        Abnormal            Final result                 Please view results for these tests on the individual orders.   GREEN TOP   LAVENDER TOP   GOLD TOP - SST   LIGHT BLUE TOP       XR Chest 1 View    Result Date: 9/18/2023  Narrative: FINDINGS: No infiltrate, effusion or pneumothorax is seen.  Heart size and pulmonary vascularity are normal.  The lungs are clear.  The mediastinum, lou and visualized osseous structures are unremarkable.     Impression: No significant abnormality of the chest.       Final diagnoses:   Atypical chest pain   Mild tetrahydrocannabinol (THC) abuse       ED Disposition  ED Disposition       ED Disposition    Discharge    Condition   Stable    Comment   --               Lynnette Crawford MD  [address]  [address]  [facility] [address] [phone]    Call in 1 day  for re evaluation, even if feeling better    Ifeanyi Colindres MD  [address] [facility] [address]LO[address]  [facility] [id number] [phone]    Call in 1 day  for re evaluation, even if feeling better    [facility] EMERGENCY DEPARTMENT  [address]  [facility] [address] [phone]    As needed, If symptoms worsen         Medication List        Changed      metoprolol succinate  MG 24 hr tablet  Commonly known as: TOPROL-XL  Take 1 tablet by mouth every night at bedtime.  What changed:   how much to take  how to take this  when to take this  additional instructions                 Jimbo Anthony MD  09/18/23 2359

## 2023-09-19 NOTE — DISCHARGE INSTRUCTIONS
Do not use marijuana or THC products.  Follow-up with primary care and cardiology for reevaluation.  Return to ER for intractable chest pain, palpitations or shortness of breath etc.

## 2023-09-19 NOTE — ED NOTES
Pt coming to ER via [facility] with chest pain and soa, pt reported eating a mood stabilizer cookie around noon today that has hemp in it, pt began having pain and soa around 4pm today.  EMS gave 4 baby asa and pt has IV established

## 2023-09-27 LAB
QT INTERVAL: 410 MS
QTC INTERVAL: 439 MS

## (undated) DEVICE — Device: Brand: DISPOSABLE ELECTROSURGICAL SNARE

## (undated) DEVICE — SINGLE-USE BIOPSY FORCEPS: Brand: RADIAL JAW 4

## (undated) DEVICE — TRAP SXN POLYP QUICKCATCH LF

## (undated) DEVICE — BITEBLOCK ENDO W/STRAP 60F A/ LF DISP

## (undated) DEVICE — TRAP,MUCUS SPECIMEN,40CC: Brand: MEDLINE

## (undated) DEVICE — MSK ENDO PORT O2 POM ELITE CURAPLEX A/